# Patient Record
Sex: FEMALE | Race: WHITE | Employment: OTHER | URBAN - NONMETROPOLITAN AREA
[De-identification: names, ages, dates, MRNs, and addresses within clinical notes are randomized per-mention and may not be internally consistent; named-entity substitution may affect disease eponyms.]

---

## 2017-09-14 ENCOUNTER — HOSPITAL ENCOUNTER (OUTPATIENT)
Dept: GENERAL RADIOLOGY | Age: 77
Discharge: HOME | End: 2017-09-14
Payer: MEDICARE

## 2017-09-14 ENCOUNTER — HOSPITAL ENCOUNTER (OUTPATIENT)
Age: 77
Discharge: HOME | End: 2017-09-14
Payer: MEDICARE

## 2017-09-14 DIAGNOSIS — G89.29 CHRONIC LEFT HIP PAIN: ICD-10-CM

## 2017-09-14 DIAGNOSIS — M25.552 CHRONIC LEFT HIP PAIN: ICD-10-CM

## 2017-09-14 PROCEDURE — 73502 X-RAY EXAM HIP UNI 2-3 VIEWS: CPT

## 2018-02-02 ENCOUNTER — HOSPITAL ENCOUNTER (OUTPATIENT)
Dept: NON INVASIVE DIAGNOSTICS | Age: 78
Discharge: HOME | End: 2018-02-02
Payer: MEDICARE

## 2018-02-02 DIAGNOSIS — I48.0 PAROXYSMAL ATRIAL FIBRILLATION (HCC): ICD-10-CM

## 2018-02-02 LAB
LV EF: 60 %
LVEF MODALITY: NORMAL

## 2018-02-02 PROCEDURE — 93306 TTE W/DOPPLER COMPLETE: CPT

## 2018-02-19 ENCOUNTER — OFFICE VISIT (OUTPATIENT)
Dept: CARDIOLOGY | Age: 78
End: 2018-02-19
Payer: MEDICARE

## 2018-02-19 VITALS
SYSTOLIC BLOOD PRESSURE: 99 MMHG | DIASTOLIC BLOOD PRESSURE: 62 MMHG | HEART RATE: 55 BPM | WEIGHT: 152 LBS | BODY MASS INDEX: 25.95 KG/M2 | RESPIRATION RATE: 16 BRPM | HEIGHT: 64 IN

## 2018-02-19 DIAGNOSIS — I34.0 MODERATE MITRAL REGURGITATION: ICD-10-CM

## 2018-02-19 DIAGNOSIS — I48.0 PAROXYSMAL ATRIAL FIBRILLATION (HCC): ICD-10-CM

## 2018-02-19 DIAGNOSIS — I48.0 PAF (PAROXYSMAL ATRIAL FIBRILLATION) (HCC): Primary | ICD-10-CM

## 2018-02-19 PROCEDURE — 99204 OFFICE O/P NEW MOD 45 MIN: CPT | Performed by: FAMILY MEDICINE

## 2018-02-19 PROCEDURE — 93000 ELECTROCARDIOGRAM COMPLETE: CPT | Performed by: FAMILY MEDICINE

## 2018-02-19 RX ORDER — FLECAINIDE ACETATE 50 MG/1
TABLET ORAL
Qty: 60 TABLET | Refills: 3 | Status: CANCELLED | OUTPATIENT
Start: 2018-02-19

## 2018-02-19 NOTE — PROGRESS NOTES
Take by mouth         FAMILY HISTORY: family history includes Arthritis in her sister and sister; Atrial Fibrillation in her sister; Diabetes in her brother and mother; Heart Attack in her brother; Heart Disease in her mother; High Blood Pressure in her father. PHYSICAL EXAM:   BP 99/62 (Site: Right Arm, Position: Sitting, Cuff Size: Medium Adult)   Pulse 55   Resp 16   Ht 5' 3.5\" (1.613 m)   Wt 152 lb (68.9 kg)   BMI 26.50 kg/m²  Body mass index is 26.5 kg/m². Constitutional: She is oriented to person, place, and time. She appears well-developed and well-nourished. In no acute distress. HEENT: Normocephalic and atraumatic. No JVD present. Carotid bruit is not present. No mass and no thyromegaly present. No lymphadenopathy present. Cardiovascular: Normal rate, regular rhythm, normal heart sounds. Exam reveals no gallop and no friction rubs. A II/VI systolic murmur at the apex of the heart with a diastolic component  Pulmonary/Chest: Effort normal and breath sounds normal. No respiratory distress. She has no wheezes, rhonchi or rales. Abdominal: Soft, non-tender. Bowel sounds and aorta are normal. She exhibits no organomegaly, mass or bruit. Extremities: No edema. No cyanosis and no clubbing. Pulses are 2+ radial and carotid pulses. 2+ dorsalis pedis and posterior tibial pulses bilaterally. Neurological: She is alert and oriented to person. No evidence of gross cranial nerve deficit. Coordination appeared normal.   Skin: Skin is warm and dry. There is no rash or diaphoresis. Psychiatric: She has a normal mood and affect.  Her speech is normal and behavior is normal.      MOST RECENT LABS ON RECORD:   Lab Results   Component Value Date    WBC 5.1 10/27/2014    HGB 11.9 (L) 10/27/2014    HCT 35.3 (L) 10/27/2014     10/27/2014    CHOL 179 05/13/2017    TRIG 68 05/13/2017    HDL 51 05/13/2017     08/28/2013    K 4.2 08/28/2013     08/28/2013    CREATININE 0.75 08/28/2013    BUN 15 ischemia. Moderate Mitral Regurgitation: Asymptomatic-Left atrium is moderately dilated (34-39) w/ left atrial volume index of 37 ml/m2. · Beta Blocker: Stop Metoprolol due to bradycardia. · Repeat echocardiogram in 1-2 years or sooner as needed. Finally, I recommended that she continue her other medications and follow up with you as previously scheduled. FOLLOW UP:   I told Ms. Springer to call my office if she had any problems, but otherwise I asked her to Return in about 1 year (around 2/19/2019). However, I would be happy to see her sooner should the need arise. Sincerely,  Wil Gross MD, MS, F.A.C.C. [facility] Specialist     Place  [address] [place] [address] [address] [place] [address]  Phone: [phone], Fax: [phone]     I believe that the risk of significant morbidity and mortality related to the patient's current medical conditions are: Intermediate. The documentation recorded by the scribe, accurately and completely reflects the services I personally performed and the decisions made by me. Ace Grimes.  Dav Julien MD, MS, F.A.C.C. 2/19/2018

## 2018-02-26 ENCOUNTER — HOSPITAL ENCOUNTER (OUTPATIENT)
Dept: NON INVASIVE DIAGNOSTICS | Age: 78
Discharge: HOME | End: 2018-02-26
Payer: MEDICARE

## 2018-02-26 DIAGNOSIS — I48.0 PAF (PAROXYSMAL ATRIAL FIBRILLATION) (HCC): ICD-10-CM

## 2018-02-26 DIAGNOSIS — I34.0 MODERATE MITRAL REGURGITATION: ICD-10-CM

## 2018-02-26 DIAGNOSIS — I48.0 PAROXYSMAL ATRIAL FIBRILLATION (HCC): ICD-10-CM

## 2018-02-26 PROCEDURE — 3430000000 HC RX DIAGNOSTIC RADIOPHARMACEUTICAL: Performed by: INTERNAL MEDICINE

## 2018-02-26 PROCEDURE — 78452 HT MUSCLE IMAGE SPECT MULT: CPT

## 2018-02-26 PROCEDURE — 93017 CV STRESS TEST TRACING ONLY: CPT

## 2018-02-26 PROCEDURE — 6360000002 HC RX W HCPCS: Performed by: FAMILY MEDICINE

## 2018-02-26 PROCEDURE — A9500 TC99M SESTAMIBI: HCPCS | Performed by: INTERNAL MEDICINE

## 2018-02-26 RX ADMIN — REGADENOSON 0.4 MG: 0.08 INJECTION, SOLUTION INTRAVENOUS at 08:50

## 2018-02-26 RX ADMIN — Medication 29.8 MILLICURIE: at 08:55

## 2018-02-27 ENCOUNTER — HOSPITAL ENCOUNTER (OUTPATIENT)
Dept: NON INVASIVE DIAGNOSTICS | Age: 78
Discharge: HOME | End: 2018-02-27
Payer: MEDICARE

## 2018-02-27 PROCEDURE — A9500 TC99M SESTAMIBI: HCPCS | Performed by: INTERNAL MEDICINE

## 2018-02-27 PROCEDURE — 3430000000 HC RX DIAGNOSTIC RADIOPHARMACEUTICAL: Performed by: INTERNAL MEDICINE

## 2018-02-27 RX ADMIN — Medication 30.7 MILLICURIE: at 08:10

## 2018-03-01 ENCOUNTER — TELEPHONE (OUTPATIENT)
Dept: CARDIOLOGY | Age: 78
End: 2018-03-01

## 2018-03-14 ENCOUNTER — OFFICE VISIT (OUTPATIENT)
Dept: CARDIOLOGY | Age: 78
End: 2018-03-14
Payer: MEDICARE

## 2018-03-14 VITALS
BODY MASS INDEX: 25.78 KG/M2 | SYSTOLIC BLOOD PRESSURE: 106 MMHG | HEIGHT: 64 IN | OXYGEN SATURATION: 96 % | WEIGHT: 151 LBS | RESPIRATION RATE: 16 BRPM | HEART RATE: 67 BPM | DIASTOLIC BLOOD PRESSURE: 69 MMHG

## 2018-03-14 DIAGNOSIS — R94.39 ABNORMAL STRESS TEST: Primary | ICD-10-CM

## 2018-03-14 DIAGNOSIS — I48.0 PAF (PAROXYSMAL ATRIAL FIBRILLATION) (HCC): ICD-10-CM

## 2018-03-14 DIAGNOSIS — I34.0 MODERATE MITRAL REGURGITATION: ICD-10-CM

## 2018-03-14 PROCEDURE — 99215 OFFICE O/P EST HI 40 MIN: CPT | Performed by: FAMILY MEDICINE

## 2018-03-14 RX ORDER — AMLODIPINE BESYLATE 2.5 MG/1
2.5 TABLET ORAL DAILY
Qty: 90 TABLET | Refills: 3 | Status: ON HOLD | OUTPATIENT
Start: 2018-03-14 | End: 2018-04-25 | Stop reason: HOSPADM

## 2018-03-14 RX ORDER — ASPIRIN 81 MG/1
81 TABLET ORAL DAILY
Qty: 90 TABLET | Refills: 3
Start: 2018-03-14

## 2018-03-14 NOTE — PROGRESS NOTES
Abrahma Smith CMA am scribing for and in the presence of Dr. Jericho Melton    Patient: Cody Foss  : 1940  Date of Visit: 2018    REASON FOR VISIT / CONSULTATION: Results (Hx: PAF, moderate mitral regurg. Stress test done on 18. Pt states that she has stayed about the same since last visit. Pt can still feel some palpitations (few seconds) but no prolonged ones since last visit. Chest pressure on/off (few to multiples a week) SOB on/off and can happen with or without exertion but mild. Restart Eliquis at last visit and stopped metoprolol due to bradycardia. )      Dear Scott Colon,    I had the pleasure of seeing Cody Foss today. Ms. Evelyn Emery is a 68 y.o. female with a history of paroxysmal atrial fibrillation. She reports that this 1st occurred a few year ago and most recently occurred first part of February  but she says she has always had some degree of palpitations. Her last previous sustained event was about a year ago. She also has a history of mitral valve prolapse but denies any heart attacks, other cardiac issues or stroke or mini stroke. She recently underwent a cardiovascular stress test on 2018 that had show to be consistent with low to intermediate risk of coronary artery disease. Since last visit, Ms. Evelyn Emery says she is in an exercise class and notices her getting tired faster. She says that she does get mild shortness of breath and chest pressure a few times per week that last for minutes in duration. The only time that she had severe chest pain was when she had a sharp pain in the middle of her back which had happened a couple of times a couple of months ago but thinks this may have been due to a spasm. She denied any abdominal pain, bleeding problems, problems with her medications or any other concerns at this time.         Past Medical History:   Diagnosis Date    Fracture of sacrum ([address]     GERD (gastroesophageal reflux disease)     H/O cardiovascular stress test 02/28/2018    Equivocal myocardial perfusion study. There is a small/moderate perufsion defect of mild intensity in the apical an anteroapical regions during stress imaging which is most consistent with artifact but may be due to a small degree of coronary ischemia. Overall, these results are most consistent with a low/intermediate risk for CAD.     H/O echocardiogram 02/02/2018    EF 60%. Mildly increased left ventricular wall thickness. No definite specific wall motion abnormalities. Bi-atrial enlargment. Myxomatous thickening of the mitral leaflets with bileaflet prolapse and moderat mitral regurg. Moderate tricuspid regurg. Mild pulmonary HTN with an estimated right ventricualr systolic pressure 81BWFC.  History of Holter monitoring 2016    PACs & PVCs    Irritable bowel syndrome     Mitral valve prolapse     Osteoarthritis     Osteopenia     Rosacea     Tendonitis of shoulder     left       CURRENT ALLERGIES: Butalbital-aspirin-caffeine and Other REVIEW OF SYSTEMS: 14 systems were reviewed. Pertinent positives and negatives as above, all else negative.      Past Surgical History:   Procedure Laterality Date    BUNIONECTOMY      COLONOSCOPY  2008    CYSTOCELE REPAIR      HERNIA REPAIR      HYSTERECTOMY      MOHS SURGERY  2008    LUBA AND BSO Bilateral 2014    VAGINA SURGERY  2003    Sling    VEIN SURGERY      Social History:  Social History   Substance Use Topics    Smoking status: Never Smoker    Smokeless tobacco: Never Used    Alcohol use Yes        CURRENT MEDICATIONS:  Outpatient Prescriptions Marked as Taking for the 3/14/18 encounter (Office Visit) with Valencia Comer MD   Medication Sig Dispense Refill    apixaban (ELIQUIS) 5 MG TABS tablet Take 1 tablet by mouth 2 times daily 180 tablet 3    Multiple Vitamins-Minerals (MULTIVITAMIN ADULT PO) Take by mouth daily      omeprazole (PRILOSEC) 40 MG delayed release capsule Take 1 capsule by mouth daily 90 capsule 3    vitamin D and behavior is normal.      MOST RECENT LABS ON RECORD:   Lab Results   Component Value Date    WBC 5.1 10/27/2014    HGB 11.9 (L) 10/27/2014    HCT 35.3 (L) 10/27/2014     10/27/2014    CHOL 179 05/13/2017    TRIG 68 05/13/2017    HDL 51 05/13/2017     08/28/2013    K 4.2 08/28/2013     08/28/2013    CREATININE 0.75 08/28/2013    BUN 15 08/28/2013    CO2 32 (H) 08/28/2013    TSH 1.74 08/28/2013       ASSESSMENT:  1. Abnormal stress test    2. PAF (paroxysmal atrial fibrillation) ([address]    3. Moderate mitral regurgitation       PLAN:  · Abnormal Stress Test:   · Because of Ms. Springer's signs, symptoms, risk factors and abnormal stress test, I recommended that Ms. Springer consider undergoing a cardiac catheterization with coronary angiography to assess her coronary anatomy and facilitate better treatment recommendations. I discussed the risks, benefits, and alternatives to the procedure including the risk of bleeding, contrast induced allergy and/or kidney damage, arrythmia, stroke, heart attack, death, or the need for further procedures. I also discussed the fact that although treatment with simple medical management is a potential treatment option in place of cardiac catheterization, I expressed my opinion that cardiac catheterization in order to define her coronary anatomy and rule out severe 3 vessel or left main coronary artery disease would significant help guide the most appropriate treatment strategy ranging from no treatment to medications, to stents, to even bypass surgery. Ms. Maria M Brower verbalized understanding of the risks benefits and alternatives and stated that she would like to proceed with heart catheterization.  I also discussed the advantages and disadvantages of having her procedure performed here at [facility] vs. a larger hospital such as [facility]. Beyond the obvious advantage of convenience, I also explained potential disadvantages including the inability to sooner as needed. Finally, I recommended that she continue her other medications and follow up with you as previously scheduled. FOLLOW UP:   I told Ms. Springer to call my office if she had any problems, but otherwise I asked her to Return in about 3 months (around 6/14/2018). However, I would be happy to see her sooner should the need arise. Sincerely,  Jimenez Carlisle. Renato MARTIN, MS, F.A.C.C. [facility] Cardiology Specialist    [facility] [address] [place] [address]  Phone: [phone], Fax: [phone]     I believe that the risk of significant morbidity and mortality related to the patient's current medical conditions are: intermediate-high. The documentation recorded by the scribe, accurately and completely reflects the services I personally performed and the decisions made by me. Bobo Devries.  Margaux Johnson MD, MS, F.A.C.C. 3/14/2018

## 2018-04-12 PROBLEM — I48.0 PAROXYSMAL ATRIAL FIBRILLATION (HCC): Status: ACTIVE | Noted: 2018-01-01

## 2018-04-24 ENCOUNTER — APPOINTMENT (OUTPATIENT)
Dept: GENERAL RADIOLOGY | Age: 78
DRG: 287 | End: 2018-04-24
Payer: MEDICARE

## 2018-04-24 ENCOUNTER — HOSPITAL ENCOUNTER (INPATIENT)
Age: 78
LOS: 1 days | Discharge: HOME | DRG: 287 | End: 2018-04-25
Attending: EMERGENCY MEDICINE | Admitting: INTERNAL MEDICINE
Payer: MEDICARE

## 2018-04-24 DIAGNOSIS — I49.5 TACHYCARDIA-BRADYCARDIA SYNDROME (HCC): Primary | ICD-10-CM

## 2018-04-24 DIAGNOSIS — I48.0 PAROXYSMAL ATRIAL FIBRILLATION (HCC): ICD-10-CM

## 2018-04-24 PROBLEM — I20.9 ANGINA, CLASS IV (HCC): Status: ACTIVE | Noted: 2018-04-24

## 2018-04-24 PROBLEM — I48.91 A-FIB (HCC): Status: ACTIVE | Noted: 2018-04-24

## 2018-04-24 PROBLEM — R94.39 ABNORMAL CARDIOVASCULAR STRESS TEST: Status: ACTIVE | Noted: 2018-04-24

## 2018-04-24 LAB
ABSOLUTE EOS #: 0.18 K/UL (ref 0–0.44)
ABSOLUTE IMMATURE GRANULOCYTE: <0.03 K/UL (ref 0–0.3)
ABSOLUTE LYMPH #: 1.37 K/UL (ref 1.1–3.7)
ABSOLUTE MONO #: 0.47 K/UL (ref 0.1–1.2)
ANION GAP SERPL CALCULATED.3IONS-SCNC: 11 MMOL/L (ref 9–17)
BASOPHILS # BLD: 1 % (ref 0–2)
BASOPHILS ABSOLUTE: 0.05 K/UL (ref 0–0.2)
BUN BLDV-MCNC: 12 MG/DL (ref 8–23)
BUN/CREAT BLD: 18 (ref 9–20)
CALCIUM SERPL-MCNC: 9.8 MG/DL (ref 8.6–10.4)
CHLORIDE BLD-SCNC: 101 MMOL/L (ref 98–107)
CO2: 29 MMOL/L (ref 20–31)
CREAT SERPL-MCNC: 0.67 MG/DL (ref 0.5–0.9)
DIFFERENTIAL TYPE: ABNORMAL
EKG ATRIAL RATE: 122 BPM
EKG Q-T INTERVAL: 302 MS
EKG QRS DURATION: 94 MS
EKG QTC CALCULATION (BAZETT): 447 MS
EKG R AXIS: 22 DEGREES
EKG T AXIS: 27 DEGREES
EKG VENTRICULAR RATE: 132 BPM
EOSINOPHILS RELATIVE PERCENT: 4 % (ref 1–4)
GFR AFRICAN AMERICAN: >60 ML/MIN
GFR NON-AFRICAN AMERICAN: >60 ML/MIN
GFR SERPL CREATININE-BSD FRML MDRD: ABNORMAL ML/MIN/{1.73_M2}
GFR SERPL CREATININE-BSD FRML MDRD: ABNORMAL ML/MIN/{1.73_M2}
GLUCOSE BLD-MCNC: 103 MG/DL (ref 70–99)
HCT VFR BLD CALC: 42 % (ref 36.3–47.1)
HEMOGLOBIN: 14.2 G/DL (ref 11.9–15.1)
IMMATURE GRANULOCYTES: 0 %
LYMPHOCYTES # BLD: 34 % (ref 24–43)
MAGNESIUM: 2 MG/DL (ref 1.6–2.6)
MCH RBC QN AUTO: 33.8 PG (ref 25.2–33.5)
MCHC RBC AUTO-ENTMCNC: 33.8 G/DL (ref 28.4–34.8)
MCV RBC AUTO: 100 FL (ref 82.6–102.9)
MONOCYTES # BLD: 12 % (ref 3–12)
NRBC AUTOMATED: 0 PER 100 WBC
PDW BLD-RTO: 12.6 % (ref 11.8–14.4)
PLATELET # BLD: 177 K/UL (ref 138–453)
PLATELET ESTIMATE: ABNORMAL
PMV BLD AUTO: 9.5 FL (ref 8.1–13.5)
POTASSIUM SERPL-SCNC: 4.1 MMOL/L (ref 3.7–5.3)
RBC # BLD: 4.2 M/UL (ref 3.95–5.11)
RBC # BLD: ABNORMAL 10*6/UL
SEG NEUTROPHILS: 49 % (ref 36–65)
SEGMENTED NEUTROPHILS ABSOLUTE COUNT: 2 K/UL (ref 1.5–8.1)
SODIUM BLD-SCNC: 141 MMOL/L (ref 135–144)
THYROXINE, FREE: 1.11 NG/DL (ref 0.93–1.7)
TROPONIN INTERP: NORMAL
TROPONIN T: <0.03 NG/ML
TSH SERPL DL<=0.05 MIU/L-ACNC: 2.46 MIU/L (ref 0.3–5)
WBC # BLD: 4.1 K/UL (ref 3.5–11.3)
WBC # BLD: ABNORMAL 10*3/UL

## 2018-04-24 PROCEDURE — 2500000003 HC RX 250 WO HCPCS

## 2018-04-24 PROCEDURE — G0378 HOSPITAL OBSERVATION PER HR: HCPCS

## 2018-04-24 PROCEDURE — 96367 TX/PROPH/DG ADDL SEQ IV INF: CPT

## 2018-04-24 PROCEDURE — 71045 X-RAY EXAM CHEST 1 VIEW: CPT

## 2018-04-24 PROCEDURE — 84484 ASSAY OF TROPONIN QUANT: CPT

## 2018-04-24 PROCEDURE — 83735 ASSAY OF MAGNESIUM: CPT

## 2018-04-24 PROCEDURE — 99222 1ST HOSP IP/OBS MODERATE 55: CPT | Performed by: FAMILY MEDICINE

## 2018-04-24 PROCEDURE — 6370000000 HC RX 637 (ALT 250 FOR IP): Performed by: INTERNAL MEDICINE

## 2018-04-24 PROCEDURE — 2580000003 HC RX 258: Performed by: PHYSICIAN ASSISTANT

## 2018-04-24 PROCEDURE — 84443 ASSAY THYROID STIM HORMONE: CPT

## 2018-04-24 PROCEDURE — 93005 ELECTROCARDIOGRAM TRACING: CPT

## 2018-04-24 PROCEDURE — 96365 THER/PROPH/DIAG IV INF INIT: CPT

## 2018-04-24 PROCEDURE — 2580000003 HC RX 258: Performed by: EMERGENCY MEDICINE

## 2018-04-24 PROCEDURE — 80048 BASIC METABOLIC PNL TOTAL CA: CPT

## 2018-04-24 PROCEDURE — 2500000003 HC RX 250 WO HCPCS: Performed by: EMERGENCY MEDICINE

## 2018-04-24 PROCEDURE — 6360000002 HC RX W HCPCS

## 2018-04-24 PROCEDURE — 1200000000 HC SEMI PRIVATE

## 2018-04-24 PROCEDURE — 6360000002 HC RX W HCPCS: Performed by: EMERGENCY MEDICINE

## 2018-04-24 PROCEDURE — 99285 EMERGENCY DEPT VISIT HI MDM: CPT

## 2018-04-24 PROCEDURE — 84439 ASSAY OF FREE THYROXINE: CPT

## 2018-04-24 PROCEDURE — 36415 COLL VENOUS BLD VENIPUNCTURE: CPT

## 2018-04-24 PROCEDURE — 85025 COMPLETE CBC W/AUTO DIFF WBC: CPT

## 2018-04-24 RX ORDER — ASPIRIN 81 MG/1
81 TABLET ORAL DAILY
Status: DISCONTINUED | OUTPATIENT
Start: 2018-04-25 | End: 2018-04-25 | Stop reason: HOSPADM

## 2018-04-24 RX ORDER — AMLODIPINE BESYLATE 2.5 MG/1
2.5 TABLET ORAL DAILY
Status: DISCONTINUED | OUTPATIENT
Start: 2018-04-25 | End: 2018-04-25 | Stop reason: HOSPADM

## 2018-04-24 RX ORDER — DILTIAZEM HYDROCHLORIDE 5 MG/ML
10 INJECTION INTRAVENOUS ONCE
Status: COMPLETED | OUTPATIENT
Start: 2018-04-24 | End: 2018-04-24

## 2018-04-24 RX ORDER — AMIODARONE HYDROCHLORIDE 50 MG/ML
200 INJECTION, SOLUTION INTRAVENOUS ONCE
Status: DISCONTINUED | OUTPATIENT
Start: 2018-04-24 | End: 2018-04-24 | Stop reason: SDUPTHER

## 2018-04-24 RX ORDER — METOPROLOL SUCCINATE 25 MG/1
25 TABLET, EXTENDED RELEASE ORAL DAILY
Status: DISCONTINUED | OUTPATIENT
Start: 2018-04-24 | End: 2018-04-24

## 2018-04-24 RX ORDER — MAGNESIUM SULFATE 1 G/100ML
1 INJECTION INTRAVENOUS PRN
Status: DISCONTINUED | OUTPATIENT
Start: 2018-04-24 | End: 2018-04-25 | Stop reason: HOSPADM

## 2018-04-24 RX ORDER — SODIUM CHLORIDE 0.9 % (FLUSH) 0.9 %
10 SYRINGE (ML) INJECTION EVERY 12 HOURS SCHEDULED
Status: DISCONTINUED | OUTPATIENT
Start: 2018-04-24 | End: 2018-04-25 | Stop reason: HOSPADM

## 2018-04-24 RX ORDER — POTASSIUM CHLORIDE 20 MEQ/1
40 TABLET, EXTENDED RELEASE ORAL PRN
Status: DISCONTINUED | OUTPATIENT
Start: 2018-04-24 | End: 2018-04-25 | Stop reason: HOSPADM

## 2018-04-24 RX ORDER — SODIUM CHLORIDE 0.9 % (FLUSH) 0.9 %
10 SYRINGE (ML) INJECTION PRN
Status: DISCONTINUED | OUTPATIENT
Start: 2018-04-24 | End: 2018-04-25 | Stop reason: HOSPADM

## 2018-04-24 RX ORDER — POTASSIUM CHLORIDE 20MEQ/15ML
40 LIQUID (ML) ORAL PRN
Status: DISCONTINUED | OUTPATIENT
Start: 2018-04-24 | End: 2018-04-25 | Stop reason: HOSPADM

## 2018-04-24 RX ORDER — ACETAMINOPHEN 325 MG/1
650 TABLET ORAL EVERY 4 HOURS PRN
Status: DISCONTINUED | OUTPATIENT
Start: 2018-04-24 | End: 2018-04-25 | Stop reason: HOSPADM

## 2018-04-24 RX ORDER — POTASSIUM CHLORIDE 7.45 MG/ML
10 INJECTION INTRAVENOUS PRN
Status: DISCONTINUED | OUTPATIENT
Start: 2018-04-24 | End: 2018-04-25 | Stop reason: HOSPADM

## 2018-04-24 RX ORDER — DOCUSATE SODIUM 100 MG/1
200 CAPSULE, LIQUID FILLED ORAL NIGHTLY
Status: DISCONTINUED | OUTPATIENT
Start: 2018-04-24 | End: 2018-04-25 | Stop reason: HOSPADM

## 2018-04-24 RX ORDER — PANTOPRAZOLE SODIUM 40 MG/1
40 TABLET, DELAYED RELEASE ORAL
Status: DISCONTINUED | OUTPATIENT
Start: 2018-04-25 | End: 2018-04-25 | Stop reason: HOSPADM

## 2018-04-24 RX ORDER — ONDANSETRON 2 MG/ML
4 INJECTION INTRAMUSCULAR; INTRAVENOUS EVERY 6 HOURS PRN
Status: DISCONTINUED | OUTPATIENT
Start: 2018-04-24 | End: 2018-04-25

## 2018-04-24 RX ADMIN — VITAMIN D, TAB 1000IU (100/BT) 1000 UNITS: 25 TAB at 20:08

## 2018-04-24 RX ADMIN — DOCUSATE SODIUM 200 MG: 100 CAPSULE, LIQUID FILLED ORAL at 20:08

## 2018-04-24 RX ADMIN — Medication 10 ML: at 20:09

## 2018-04-24 RX ADMIN — APIXABAN 5 MG: 5 TABLET, FILM COATED ORAL at 20:08

## 2018-04-24 RX ADMIN — AMIODARONE HYDROCHLORIDE 200 MG: 50 INJECTION, SOLUTION INTRAVENOUS at 10:57

## 2018-04-24 RX ADMIN — DILTIAZEM HYDROCHLORIDE 10 MG: 5 INJECTION INTRAVENOUS at 09:21

## 2018-04-24 RX ADMIN — DILTIAZEM HYDROCHLORIDE 10 MG/HR: 5 INJECTION INTRAVENOUS at 09:22

## 2018-04-24 RX ADMIN — Medication 400 MG: at 20:08

## 2018-04-24 ASSESSMENT — ENCOUNTER SYMPTOMS
SHORTNESS OF BREATH: 0
ABDOMINAL DISTENTION: 0
COUGH: 0
WHEEZING: 0
BACK PAIN: 0
ABDOMINAL PAIN: 0
COLOR CHANGE: 0
CHEST TIGHTNESS: 0

## 2018-04-24 ASSESSMENT — PAIN DESCRIPTION - LOCATION: LOCATION: CHEST

## 2018-04-24 ASSESSMENT — PAIN SCALES - GENERAL
PAINLEVEL_OUTOF10: 1
PAINLEVEL_OUTOF10: 0

## 2018-04-24 ASSESSMENT — PAIN DESCRIPTION - PAIN TYPE: TYPE: ACUTE PAIN

## 2018-04-24 ASSESSMENT — PAIN DESCRIPTION - ORIENTATION: ORIENTATION: ANTERIOR;MID

## 2018-04-24 ASSESSMENT — PAIN DESCRIPTION - DESCRIPTORS: DESCRIPTORS: PRESSURE

## 2018-04-25 ENCOUNTER — APPOINTMENT (OUTPATIENT)
Dept: CARDIAC CATH/INVASIVE PROCEDURES | Age: 78
DRG: 287 | End: 2018-04-25
Payer: MEDICARE

## 2018-04-25 VITALS
DIASTOLIC BLOOD PRESSURE: 67 MMHG | BODY MASS INDEX: 24.45 KG/M2 | HEIGHT: 64 IN | TEMPERATURE: 97.1 F | SYSTOLIC BLOOD PRESSURE: 101 MMHG | RESPIRATION RATE: 15 BRPM | WEIGHT: 143.2 LBS | HEART RATE: 66 BPM | OXYGEN SATURATION: 97 %

## 2018-04-25 LAB
ALBUMIN SERPL-MCNC: 3.8 G/DL (ref 3.5–5.2)
ALBUMIN/GLOBULIN RATIO: 1.7 (ref 1–2.5)
ALP BLD-CCNC: 94 U/L (ref 35–104)
ALT SERPL-CCNC: 18 U/L (ref 5–33)
ANION GAP SERPL CALCULATED.3IONS-SCNC: 9 MMOL/L (ref 9–17)
AST SERPL-CCNC: 26 U/L
BILIRUB SERPL-MCNC: 0.41 MG/DL (ref 0.3–1.2)
BUN BLDV-MCNC: 15 MG/DL (ref 8–23)
BUN/CREAT BLD: 23 (ref 9–20)
CALCIUM SERPL-MCNC: 8.7 MG/DL (ref 8.6–10.4)
CHLORIDE BLD-SCNC: 101 MMOL/L (ref 98–107)
CO2: 30 MMOL/L (ref 20–31)
CREAT SERPL-MCNC: 0.65 MG/DL (ref 0.5–0.9)
EKG ATRIAL RATE: 56 BPM
EKG P AXIS: 92 DEGREES
EKG P-R INTERVAL: 160 MS
EKG Q-T INTERVAL: 432 MS
EKG QRS DURATION: 92 MS
EKG QTC CALCULATION (BAZETT): 416 MS
EKG R AXIS: 34 DEGREES
EKG T AXIS: 52 DEGREES
EKG VENTRICULAR RATE: 56 BPM
GFR AFRICAN AMERICAN: >60 ML/MIN
GFR NON-AFRICAN AMERICAN: >60 ML/MIN
GFR SERPL CREATININE-BSD FRML MDRD: ABNORMAL ML/MIN/{1.73_M2}
GFR SERPL CREATININE-BSD FRML MDRD: ABNORMAL ML/MIN/{1.73_M2}
GLUCOSE BLD-MCNC: 94 MG/DL (ref 70–99)
HCT VFR BLD CALC: 38.1 % (ref 36.3–47.1)
HEMOGLOBIN: 12.6 G/DL (ref 11.9–15.1)
MCH RBC QN AUTO: 33.5 PG (ref 25.2–33.5)
MCHC RBC AUTO-ENTMCNC: 33.1 G/DL (ref 28.4–34.8)
MCV RBC AUTO: 101.3 FL (ref 82.6–102.9)
NRBC AUTOMATED: 0 PER 100 WBC
PDW BLD-RTO: 12.9 % (ref 11.8–14.4)
PLATELET # BLD: 165 K/UL (ref 138–453)
PMV BLD AUTO: 9.6 FL (ref 8.1–13.5)
POTASSIUM SERPL-SCNC: 4 MMOL/L (ref 3.7–5.3)
RBC # BLD: 3.76 M/UL (ref 3.95–5.11)
SODIUM BLD-SCNC: 140 MMOL/L (ref 135–144)
TOTAL PROTEIN: 6.1 G/DL (ref 6.4–8.3)
WBC # BLD: 4.1 K/UL (ref 3.5–11.3)

## 2018-04-25 PROCEDURE — 6370000000 HC RX 637 (ALT 250 FOR IP): Performed by: INTERNAL MEDICINE

## 2018-04-25 PROCEDURE — 93458 L HRT ARTERY/VENTRICLE ANGIO: CPT | Performed by: FAMILY MEDICINE

## 2018-04-25 PROCEDURE — C1887 CATHETER, GUIDING: HCPCS

## 2018-04-25 PROCEDURE — 93005 ELECTROCARDIOGRAM TRACING: CPT

## 2018-04-25 PROCEDURE — 2709999900 HC NON-CHARGEABLE SUPPLY

## 2018-04-25 PROCEDURE — C1725 CATH, TRANSLUMIN NON-LASER: HCPCS

## 2018-04-25 PROCEDURE — 85027 COMPLETE CBC AUTOMATED: CPT

## 2018-04-25 PROCEDURE — C1769 GUIDE WIRE: HCPCS

## 2018-04-25 PROCEDURE — 6370000000 HC RX 637 (ALT 250 FOR IP): Performed by: FAMILY MEDICINE

## 2018-04-25 PROCEDURE — 80053 COMPREHEN METABOLIC PANEL: CPT

## 2018-04-25 PROCEDURE — G0378 HOSPITAL OBSERVATION PER HR: HCPCS

## 2018-04-25 PROCEDURE — B2111ZZ FLUOROSCOPY OF MULTIPLE CORONARY ARTERIES USING LOW OSMOLAR CONTRAST: ICD-10-PCS | Performed by: FAMILY MEDICINE

## 2018-04-25 PROCEDURE — B2151ZZ FLUOROSCOPY OF LEFT HEART USING LOW OSMOLAR CONTRAST: ICD-10-PCS | Performed by: FAMILY MEDICINE

## 2018-04-25 PROCEDURE — 4A023N7 MEASUREMENT OF CARDIAC SAMPLING AND PRESSURE, LEFT HEART, PERCUTANEOUS APPROACH: ICD-10-PCS | Performed by: FAMILY MEDICINE

## 2018-04-25 PROCEDURE — 36415 COLL VENOUS BLD VENIPUNCTURE: CPT

## 2018-04-25 PROCEDURE — C1894 INTRO/SHEATH, NON-LASER: HCPCS

## 2018-04-25 RX ORDER — AMIODARONE HYDROCHLORIDE 100 MG/1
100 TABLET ORAL DAILY
Qty: 30 TABLET | Refills: 0 | Status: ON HOLD | OUTPATIENT
Start: 2018-04-26 | End: 2018-05-04 | Stop reason: HOSPADM

## 2018-04-25 RX ORDER — AMIODARONE HYDROCHLORIDE 200 MG/1
100 TABLET ORAL DAILY
Status: DISCONTINUED | OUTPATIENT
Start: 2018-04-25 | End: 2018-04-25 | Stop reason: HOSPADM

## 2018-04-25 RX ADMIN — AMLODIPINE BESYLATE 2.5 MG: 2.5 TABLET ORAL at 10:41

## 2018-04-25 RX ADMIN — PANTOPRAZOLE SODIUM 40 MG: 40 TABLET, DELAYED RELEASE ORAL at 10:41

## 2018-04-25 RX ADMIN — AMIODARONE HYDROCHLORIDE 100 MG: 200 TABLET ORAL at 10:41

## 2018-04-25 RX ADMIN — ASPIRIN 81 MG: 81 TABLET, COATED ORAL at 10:41

## 2018-04-25 ASSESSMENT — PAIN SCALES - GENERAL: PAINLEVEL_OUTOF10: 0

## 2018-04-26 ENCOUNTER — TELEPHONE (OUTPATIENT)
Dept: PHARMACY | Facility: CLINIC | Age: 78
End: 2018-04-26

## 2018-04-26 DIAGNOSIS — I48.91 ATRIAL FIBRILLATION WITH RAPID VENTRICULAR RESPONSE (HCC): Primary | ICD-10-CM

## 2018-04-26 PROCEDURE — 1111F DSCHRG MED/CURRENT MED MERGE: CPT | Performed by: PHARMACIST

## 2018-05-01 ENCOUNTER — HOSPITAL ENCOUNTER (INPATIENT)
Age: 78
LOS: 3 days | Discharge: HOME | DRG: 950 | End: 2018-05-04
Attending: INTERNAL MEDICINE | Admitting: INTERNAL MEDICINE
Payer: MEDICARE

## 2018-05-01 PROBLEM — Z51.81 VISIT FOR MONITORING TIKOSYN THERAPY: Status: ACTIVE | Noted: 2018-05-01

## 2018-05-01 PROBLEM — Z79.899 VISIT FOR MONITORING TIKOSYN THERAPY: Status: ACTIVE | Noted: 2018-05-01

## 2018-05-01 LAB
ANION GAP SERPL CALCULATED.3IONS-SCNC: 10 MMOL/L (ref 9–17)
BUN BLDV-MCNC: 18 MG/DL (ref 8–23)
BUN/CREAT BLD: 25 (ref 9–20)
CALCIUM SERPL-MCNC: 9.3 MG/DL (ref 8.6–10.4)
CHLORIDE BLD-SCNC: 100 MMOL/L (ref 98–107)
CO2: 28 MMOL/L (ref 20–31)
CREAT SERPL-MCNC: 0.73 MG/DL (ref 0.5–0.9)
GFR AFRICAN AMERICAN: >60 ML/MIN
GFR NON-AFRICAN AMERICAN: >60 ML/MIN
GFR SERPL CREATININE-BSD FRML MDRD: ABNORMAL ML/MIN/{1.73_M2}
GFR SERPL CREATININE-BSD FRML MDRD: ABNORMAL ML/MIN/{1.73_M2}
GLUCOSE BLD-MCNC: 74 MG/DL (ref 70–99)
HCT VFR BLD CALC: 38.2 % (ref 36.3–47.1)
HEMOGLOBIN: 12.5 G/DL (ref 11.9–15.1)
MAGNESIUM: 1.9 MG/DL (ref 1.6–2.6)
MCH RBC QN AUTO: 33.1 PG (ref 25.2–33.5)
MCHC RBC AUTO-ENTMCNC: 32.7 G/DL (ref 28.4–34.8)
MCV RBC AUTO: 101.1 FL (ref 82.6–102.9)
NRBC AUTOMATED: 0 PER 100 WBC
PDW BLD-RTO: 12.7 % (ref 11.8–14.4)
PHOSPHORUS: 3.8 MG/DL (ref 2.6–4.5)
PLATELET # BLD: 162 K/UL (ref 138–453)
PMV BLD AUTO: 9.6 FL (ref 8.1–13.5)
POTASSIUM SERPL-SCNC: 3.9 MMOL/L (ref 3.7–5.3)
RBC # BLD: 3.78 M/UL (ref 3.95–5.11)
SODIUM BLD-SCNC: 138 MMOL/L (ref 135–144)
WBC # BLD: 3.9 K/UL (ref 3.5–11.3)

## 2018-05-01 PROCEDURE — 94760 N-INVAS EAR/PLS OXIMETRY 1: CPT

## 2018-05-01 PROCEDURE — 1200000000 HC SEMI PRIVATE

## 2018-05-01 PROCEDURE — 6370000000 HC RX 637 (ALT 250 FOR IP): Performed by: FAMILY MEDICINE

## 2018-05-01 PROCEDURE — 83735 ASSAY OF MAGNESIUM: CPT

## 2018-05-01 PROCEDURE — 99222 1ST HOSP IP/OBS MODERATE 55: CPT | Performed by: FAMILY MEDICINE

## 2018-05-01 PROCEDURE — 80048 BASIC METABOLIC PNL TOTAL CA: CPT

## 2018-05-01 PROCEDURE — 93005 ELECTROCARDIOGRAM TRACING: CPT

## 2018-05-01 PROCEDURE — 6370000000 HC RX 637 (ALT 250 FOR IP): Performed by: PHYSICIAN ASSISTANT

## 2018-05-01 PROCEDURE — 84100 ASSAY OF PHOSPHORUS: CPT

## 2018-05-01 PROCEDURE — 2580000003 HC RX 258: Performed by: PHYSICIAN ASSISTANT

## 2018-05-01 PROCEDURE — 85027 COMPLETE CBC AUTOMATED: CPT

## 2018-05-01 PROCEDURE — 36415 COLL VENOUS BLD VENIPUNCTURE: CPT

## 2018-05-01 RX ORDER — ONDANSETRON 2 MG/ML
4 INJECTION INTRAMUSCULAR; INTRAVENOUS EVERY 6 HOURS PRN
Status: DISCONTINUED | OUTPATIENT
Start: 2018-05-01 | End: 2018-05-01

## 2018-05-01 RX ORDER — CALCIUM CARBONATE 300MG(750)
1 TABLET,CHEWABLE ORAL NIGHTLY
Status: DISCONTINUED | OUTPATIENT
Start: 2018-05-01 | End: 2018-05-01

## 2018-05-01 RX ORDER — DOFETILIDE 0.25 MG/1
250 CAPSULE ORAL EVERY 12 HOURS SCHEDULED
Status: DISCONTINUED | OUTPATIENT
Start: 2018-05-01 | End: 2018-05-04 | Stop reason: HOSPADM

## 2018-05-01 RX ORDER — M-VIT,TX,IRON,MINS/CALC/FOLIC 27MG-0.4MG
1 TABLET ORAL DAILY
Status: DISCONTINUED | OUTPATIENT
Start: 2018-05-02 | End: 2018-05-04 | Stop reason: HOSPADM

## 2018-05-01 RX ORDER — DOCUSATE SODIUM 100 MG/1
200 CAPSULE, LIQUID FILLED ORAL NIGHTLY
Status: DISCONTINUED | OUTPATIENT
Start: 2018-05-01 | End: 2018-05-04 | Stop reason: HOSPADM

## 2018-05-01 RX ORDER — LACTOBACILLUS RHAMNOSUS GG 10B CELL
1 CAPSULE ORAL DAILY
Status: DISCONTINUED | OUTPATIENT
Start: 2018-05-01 | End: 2018-05-04 | Stop reason: HOSPADM

## 2018-05-01 RX ORDER — ASPIRIN 81 MG/1
81 TABLET ORAL DAILY
Status: DISCONTINUED | OUTPATIENT
Start: 2018-05-02 | End: 2018-05-04 | Stop reason: HOSPADM

## 2018-05-01 RX ORDER — SODIUM CHLORIDE 0.9 % (FLUSH) 0.9 %
10 SYRINGE (ML) INJECTION EVERY 12 HOURS SCHEDULED
Status: DISCONTINUED | OUTPATIENT
Start: 2018-05-01 | End: 2018-05-04 | Stop reason: HOSPADM

## 2018-05-01 RX ORDER — SODIUM CHLORIDE 0.9 % (FLUSH) 0.9 %
10 SYRINGE (ML) INJECTION PRN
Status: DISCONTINUED | OUTPATIENT
Start: 2018-05-01 | End: 2018-05-04 | Stop reason: HOSPADM

## 2018-05-01 RX ORDER — PANTOPRAZOLE SODIUM 40 MG/1
40 TABLET, DELAYED RELEASE ORAL
Status: DISCONTINUED | OUTPATIENT
Start: 2018-05-02 | End: 2018-05-04 | Stop reason: HOSPADM

## 2018-05-01 RX ADMIN — DOCUSATE SODIUM 200 MG: 100 CAPSULE, LIQUID FILLED ORAL at 20:59

## 2018-05-01 RX ADMIN — DOFETILIDE 250 MCG: 0.25 CAPSULE ORAL at 20:59

## 2018-05-01 RX ADMIN — Medication 10 ML: at 09:02

## 2018-05-01 RX ADMIN — APIXABAN 5 MG: 5 TABLET, FILM COATED ORAL at 20:59

## 2018-05-01 RX ADMIN — VITAMIN D, TAB 1000IU (100/BT) 1000 UNITS: 25 TAB at 20:59

## 2018-05-01 RX ADMIN — DOFETILIDE 250 MCG: 0.25 CAPSULE ORAL at 11:06

## 2018-05-01 RX ADMIN — Medication 10 ML: at 21:03

## 2018-05-01 ASSESSMENT — PAIN SCALES - GENERAL
PAINLEVEL_OUTOF10: 0

## 2018-05-02 LAB
ANION GAP SERPL CALCULATED.3IONS-SCNC: 6 MMOL/L (ref 9–17)
BUN BLDV-MCNC: 16 MG/DL (ref 8–23)
BUN/CREAT BLD: 24 (ref 9–20)
CALCIUM SERPL-MCNC: 8.8 MG/DL (ref 8.6–10.4)
CHLORIDE BLD-SCNC: 102 MMOL/L (ref 98–107)
CO2: 31 MMOL/L (ref 20–31)
CREAT SERPL-MCNC: 0.67 MG/DL (ref 0.5–0.9)
EKG ATRIAL RATE: 50 BPM
EKG ATRIAL RATE: 50 BPM
EKG ATRIAL RATE: 52 BPM
EKG ATRIAL RATE: 54 BPM
EKG P AXIS: 100 DEGREES
EKG P AXIS: 22 DEGREES
EKG P AXIS: 87 DEGREES
EKG P AXIS: 90 DEGREES
EKG P-R INTERVAL: 150 MS
EKG P-R INTERVAL: 152 MS
EKG P-R INTERVAL: 164 MS
EKG P-R INTERVAL: 166 MS
EKG Q-T INTERVAL: 450 MS
EKG Q-T INTERVAL: 460 MS
EKG Q-T INTERVAL: 488 MS
EKG Q-T INTERVAL: 512 MS
EKG QRS DURATION: 78 MS
EKG QRS DURATION: 84 MS
EKG QRS DURATION: 86 MS
EKG QRS DURATION: 96 MS
EKG QTC CALCULATION (BAZETT): 410 MS
EKG QTC CALCULATION (BAZETT): 436 MS
EKG QTC CALCULATION (BAZETT): 444 MS
EKG QTC CALCULATION (BAZETT): 476 MS
EKG R AXIS: 28 DEGREES
EKG R AXIS: 33 DEGREES
EKG R AXIS: 33 DEGREES
EKG R AXIS: 34 DEGREES
EKG T AXIS: 41 DEGREES
EKG T AXIS: 48 DEGREES
EKG T AXIS: 51 DEGREES
EKG T AXIS: 51 DEGREES
EKG VENTRICULAR RATE: 50 BPM
EKG VENTRICULAR RATE: 50 BPM
EKG VENTRICULAR RATE: 52 BPM
EKG VENTRICULAR RATE: 54 BPM
GFR AFRICAN AMERICAN: >60 ML/MIN
GFR NON-AFRICAN AMERICAN: >60 ML/MIN
GFR SERPL CREATININE-BSD FRML MDRD: ABNORMAL ML/MIN/{1.73_M2}
GFR SERPL CREATININE-BSD FRML MDRD: ABNORMAL ML/MIN/{1.73_M2}
GLUCOSE BLD-MCNC: 95 MG/DL (ref 70–99)
HCT VFR BLD CALC: 36.3 % (ref 36.3–47.1)
HEMOGLOBIN: 12 G/DL (ref 11.9–15.1)
MAGNESIUM: 2 MG/DL (ref 1.6–2.6)
MCH RBC QN AUTO: 33.5 PG (ref 25.2–33.5)
MCHC RBC AUTO-ENTMCNC: 33.1 G/DL (ref 28.4–34.8)
MCV RBC AUTO: 101.4 FL (ref 82.6–102.9)
NRBC AUTOMATED: 0 PER 100 WBC
PDW BLD-RTO: 12.5 % (ref 11.8–14.4)
PHOSPHORUS: 4.3 MG/DL (ref 2.6–4.5)
PLATELET # BLD: 156 K/UL (ref 138–453)
PMV BLD AUTO: 9.5 FL (ref 8.1–13.5)
POTASSIUM SERPL-SCNC: 4.1 MMOL/L (ref 3.7–5.3)
RBC # BLD: 3.58 M/UL (ref 3.95–5.11)
SODIUM BLD-SCNC: 139 MMOL/L (ref 135–144)
WBC # BLD: 3.4 K/UL (ref 3.5–11.3)

## 2018-05-02 PROCEDURE — 80048 BASIC METABOLIC PNL TOTAL CA: CPT

## 2018-05-02 PROCEDURE — 6370000000 HC RX 637 (ALT 250 FOR IP): Performed by: INTERNAL MEDICINE

## 2018-05-02 PROCEDURE — 83735 ASSAY OF MAGNESIUM: CPT

## 2018-05-02 PROCEDURE — 93005 ELECTROCARDIOGRAM TRACING: CPT

## 2018-05-02 PROCEDURE — 99232 SBSQ HOSP IP/OBS MODERATE 35: CPT | Performed by: FAMILY MEDICINE

## 2018-05-02 PROCEDURE — 84100 ASSAY OF PHOSPHORUS: CPT

## 2018-05-02 PROCEDURE — 85027 COMPLETE CBC AUTOMATED: CPT

## 2018-05-02 PROCEDURE — 2580000003 HC RX 258: Performed by: PHYSICIAN ASSISTANT

## 2018-05-02 PROCEDURE — 36415 COLL VENOUS BLD VENIPUNCTURE: CPT

## 2018-05-02 PROCEDURE — 6370000000 HC RX 637 (ALT 250 FOR IP): Performed by: PHYSICIAN ASSISTANT

## 2018-05-02 PROCEDURE — 6370000000 HC RX 637 (ALT 250 FOR IP): Performed by: FAMILY MEDICINE

## 2018-05-02 PROCEDURE — 1200000000 HC SEMI PRIVATE

## 2018-05-02 RX ADMIN — Medication 10 ML: at 20:45

## 2018-05-02 RX ADMIN — DOFETILIDE 250 MCG: 0.25 CAPSULE ORAL at 08:46

## 2018-05-02 RX ADMIN — DOFETILIDE 250 MCG: 0.25 CAPSULE ORAL at 20:44

## 2018-05-02 RX ADMIN — APIXABAN 5 MG: 5 TABLET, FILM COATED ORAL at 20:44

## 2018-05-02 RX ADMIN — ASPIRIN 81 MG: 81 TABLET, COATED ORAL at 08:46

## 2018-05-02 RX ADMIN — MULTIPLE VITAMINS W/ MINERALS TAB 1 TABLET: TAB at 08:46

## 2018-05-02 RX ADMIN — PANTOPRAZOLE SODIUM 40 MG: 40 TABLET, DELAYED RELEASE ORAL at 07:58

## 2018-05-02 RX ADMIN — APIXABAN 5 MG: 5 TABLET, FILM COATED ORAL at 08:45

## 2018-05-02 RX ADMIN — DOCUSATE SODIUM 200 MG: 100 CAPSULE, LIQUID FILLED ORAL at 20:44

## 2018-05-02 RX ADMIN — Medication 400 MG: at 08:46

## 2018-05-02 RX ADMIN — Medication 1 CAPSULE: at 08:46

## 2018-05-02 RX ADMIN — VITAMIN D, TAB 1000IU (100/BT) 1000 UNITS: 25 TAB at 20:44

## 2018-05-02 RX ADMIN — Medication 10 ML: at 08:48

## 2018-05-02 ASSESSMENT — PAIN SCALES - GENERAL
PAINLEVEL_OUTOF10: 0

## 2018-05-03 LAB
ANION GAP SERPL CALCULATED.3IONS-SCNC: 7 MMOL/L (ref 9–17)
BUN BLDV-MCNC: 16 MG/DL (ref 8–23)
BUN/CREAT BLD: 24 (ref 9–20)
CALCIUM SERPL-MCNC: 8.9 MG/DL (ref 8.6–10.4)
CHLORIDE BLD-SCNC: 103 MMOL/L (ref 98–107)
CO2: 30 MMOL/L (ref 20–31)
CREAT SERPL-MCNC: 0.66 MG/DL (ref 0.5–0.9)
GFR AFRICAN AMERICAN: >60 ML/MIN
GFR NON-AFRICAN AMERICAN: >60 ML/MIN
GFR SERPL CREATININE-BSD FRML MDRD: ABNORMAL ML/MIN/{1.73_M2}
GFR SERPL CREATININE-BSD FRML MDRD: ABNORMAL ML/MIN/{1.73_M2}
GLUCOSE BLD-MCNC: 90 MG/DL (ref 70–99)
HCT VFR BLD CALC: 35.4 % (ref 36.3–47.1)
HEMOGLOBIN: 11.9 G/DL (ref 11.9–15.1)
MAGNESIUM: 2 MG/DL (ref 1.6–2.6)
MCH RBC QN AUTO: 33.5 PG (ref 25.2–33.5)
MCHC RBC AUTO-ENTMCNC: 33.6 G/DL (ref 28.4–34.8)
MCV RBC AUTO: 99.7 FL (ref 82.6–102.9)
NRBC AUTOMATED: 0 PER 100 WBC
PDW BLD-RTO: 12.6 % (ref 11.8–14.4)
PHOSPHORUS: 4 MG/DL (ref 2.6–4.5)
PLATELET # BLD: 152 K/UL (ref 138–453)
PMV BLD AUTO: 9.7 FL (ref 8.1–13.5)
POTASSIUM SERPL-SCNC: 4.1 MMOL/L (ref 3.7–5.3)
RBC # BLD: 3.55 M/UL (ref 3.95–5.11)
SODIUM BLD-SCNC: 140 MMOL/L (ref 135–144)
WBC # BLD: 3.5 K/UL (ref 3.5–11.3)

## 2018-05-03 PROCEDURE — 84100 ASSAY OF PHOSPHORUS: CPT

## 2018-05-03 PROCEDURE — 93005 ELECTROCARDIOGRAM TRACING: CPT

## 2018-05-03 PROCEDURE — 2580000003 HC RX 258: Performed by: PHYSICIAN ASSISTANT

## 2018-05-03 PROCEDURE — 36415 COLL VENOUS BLD VENIPUNCTURE: CPT

## 2018-05-03 PROCEDURE — 1200000000 HC SEMI PRIVATE

## 2018-05-03 PROCEDURE — 85027 COMPLETE CBC AUTOMATED: CPT

## 2018-05-03 PROCEDURE — 6370000000 HC RX 637 (ALT 250 FOR IP): Performed by: INTERNAL MEDICINE

## 2018-05-03 PROCEDURE — 83735 ASSAY OF MAGNESIUM: CPT

## 2018-05-03 PROCEDURE — 6370000000 HC RX 637 (ALT 250 FOR IP): Performed by: FAMILY MEDICINE

## 2018-05-03 PROCEDURE — 80048 BASIC METABOLIC PNL TOTAL CA: CPT

## 2018-05-03 PROCEDURE — 99233 SBSQ HOSP IP/OBS HIGH 50: CPT | Performed by: FAMILY MEDICINE

## 2018-05-03 PROCEDURE — 6370000000 HC RX 637 (ALT 250 FOR IP): Performed by: PHYSICIAN ASSISTANT

## 2018-05-03 RX ADMIN — APIXABAN 5 MG: 5 TABLET, FILM COATED ORAL at 08:58

## 2018-05-03 RX ADMIN — Medication 10 ML: at 09:00

## 2018-05-03 RX ADMIN — Medication 10 ML: at 20:03

## 2018-05-03 RX ADMIN — APIXABAN 5 MG: 5 TABLET, FILM COATED ORAL at 20:02

## 2018-05-03 RX ADMIN — VITAMIN D, TAB 1000IU (100/BT) 1000 UNITS: 25 TAB at 20:01

## 2018-05-03 RX ADMIN — PANTOPRAZOLE SODIUM 40 MG: 40 TABLET, DELAYED RELEASE ORAL at 06:53

## 2018-05-03 RX ADMIN — ASPIRIN 81 MG: 81 TABLET, COATED ORAL at 08:57

## 2018-05-03 RX ADMIN — DOFETILIDE 250 MCG: 0.25 CAPSULE ORAL at 08:58

## 2018-05-03 RX ADMIN — Medication 400 MG: at 08:57

## 2018-05-03 RX ADMIN — DOFETILIDE 250 MCG: 0.25 CAPSULE ORAL at 20:02

## 2018-05-03 RX ADMIN — Medication 1 CAPSULE: at 08:57

## 2018-05-03 RX ADMIN — DOCUSATE SODIUM 200 MG: 100 CAPSULE, LIQUID FILLED ORAL at 20:01

## 2018-05-03 RX ADMIN — MULTIPLE VITAMINS W/ MINERALS TAB 1 TABLET: TAB at 08:57

## 2018-05-03 ASSESSMENT — PAIN SCALES - GENERAL
PAINLEVEL_OUTOF10: 0
PAINLEVEL_OUTOF10: 0

## 2018-05-04 VITALS
TEMPERATURE: 96.9 F | RESPIRATION RATE: 16 BRPM | BODY MASS INDEX: 25.81 KG/M2 | WEIGHT: 151.2 LBS | HEART RATE: 45 BPM | SYSTOLIC BLOOD PRESSURE: 106 MMHG | HEIGHT: 64 IN | OXYGEN SATURATION: 97 % | DIASTOLIC BLOOD PRESSURE: 70 MMHG

## 2018-05-04 LAB
ANION GAP SERPL CALCULATED.3IONS-SCNC: 8 MMOL/L (ref 9–17)
BUN BLDV-MCNC: 16 MG/DL (ref 8–23)
BUN/CREAT BLD: 25 (ref 9–20)
CALCIUM SERPL-MCNC: 8.9 MG/DL (ref 8.6–10.4)
CHLORIDE BLD-SCNC: 103 MMOL/L (ref 98–107)
CO2: 31 MMOL/L (ref 20–31)
CREAT SERPL-MCNC: 0.64 MG/DL (ref 0.5–0.9)
EKG ATRIAL RATE: 47 BPM
EKG ATRIAL RATE: 48 BPM
EKG ATRIAL RATE: 49 BPM
EKG P AXIS: 81 DEGREES
EKG P AXIS: 90 DEGREES
EKG P AXIS: 92 DEGREES
EKG P-R INTERVAL: 148 MS
EKG P-R INTERVAL: 154 MS
EKG P-R INTERVAL: 174 MS
EKG Q-T INTERVAL: 474 MS
EKG Q-T INTERVAL: 486 MS
EKG Q-T INTERVAL: 512 MS
EKG QRS DURATION: 76 MS
EKG QRS DURATION: 86 MS
EKG QRS DURATION: 88 MS
EKG QTC CALCULATION (BAZETT): 423 MS
EKG QTC CALCULATION (BAZETT): 430 MS
EKG QTC CALCULATION (BAZETT): 462 MS
EKG R AXIS: 17 DEGREES
EKG R AXIS: 30 DEGREES
EKG R AXIS: 32 DEGREES
EKG T AXIS: 29 DEGREES
EKG T AXIS: 42 DEGREES
EKG T AXIS: 49 DEGREES
EKG VENTRICULAR RATE: 47 BPM
EKG VENTRICULAR RATE: 48 BPM
EKG VENTRICULAR RATE: 49 BPM
GFR AFRICAN AMERICAN: >60 ML/MIN
GFR NON-AFRICAN AMERICAN: >60 ML/MIN
GFR SERPL CREATININE-BSD FRML MDRD: ABNORMAL ML/MIN/{1.73_M2}
GFR SERPL CREATININE-BSD FRML MDRD: ABNORMAL ML/MIN/{1.73_M2}
GLUCOSE BLD-MCNC: 89 MG/DL (ref 70–99)
HCT VFR BLD CALC: 35.9 % (ref 36.3–47.1)
HEMOGLOBIN: 12.1 G/DL (ref 11.9–15.1)
MAGNESIUM: 2 MG/DL (ref 1.6–2.6)
MCH RBC QN AUTO: 33.8 PG (ref 25.2–33.5)
MCHC RBC AUTO-ENTMCNC: 33.7 G/DL (ref 28.4–34.8)
MCV RBC AUTO: 100.3 FL (ref 82.6–102.9)
NRBC AUTOMATED: 0 PER 100 WBC
PDW BLD-RTO: 12.4 % (ref 11.8–14.4)
PHOSPHORUS: 4 MG/DL (ref 2.6–4.5)
PLATELET # BLD: 152 K/UL (ref 138–453)
PMV BLD AUTO: 9.6 FL (ref 8.1–13.5)
POTASSIUM SERPL-SCNC: 4.2 MMOL/L (ref 3.7–5.3)
RBC # BLD: 3.58 M/UL (ref 3.95–5.11)
SODIUM BLD-SCNC: 142 MMOL/L (ref 135–144)
WBC # BLD: 3.5 K/UL (ref 3.5–11.3)

## 2018-05-04 PROCEDURE — 84100 ASSAY OF PHOSPHORUS: CPT

## 2018-05-04 PROCEDURE — 36415 COLL VENOUS BLD VENIPUNCTURE: CPT

## 2018-05-04 PROCEDURE — 6370000000 HC RX 637 (ALT 250 FOR IP): Performed by: INTERNAL MEDICINE

## 2018-05-04 PROCEDURE — 83735 ASSAY OF MAGNESIUM: CPT

## 2018-05-04 PROCEDURE — 99233 SBSQ HOSP IP/OBS HIGH 50: CPT | Performed by: FAMILY MEDICINE

## 2018-05-04 PROCEDURE — 80048 BASIC METABOLIC PNL TOTAL CA: CPT

## 2018-05-04 PROCEDURE — 85027 COMPLETE CBC AUTOMATED: CPT

## 2018-05-04 PROCEDURE — 6370000000 HC RX 637 (ALT 250 FOR IP): Performed by: FAMILY MEDICINE

## 2018-05-04 PROCEDURE — 93005 ELECTROCARDIOGRAM TRACING: CPT

## 2018-05-04 PROCEDURE — 6370000000 HC RX 637 (ALT 250 FOR IP): Performed by: PHYSICIAN ASSISTANT

## 2018-05-04 RX ORDER — DOFETILIDE 0.25 MG/1
500 CAPSULE ORAL 2 TIMES DAILY
Qty: 180 CAPSULE | Refills: 3 | Status: SHIPPED | OUTPATIENT
Start: 2018-05-04 | End: 2018-05-07 | Stop reason: DRUGHIGH

## 2018-05-04 RX ADMIN — ASPIRIN 81 MG: 81 TABLET, COATED ORAL at 08:18

## 2018-05-04 RX ADMIN — Medication 1 CAPSULE: at 08:18

## 2018-05-04 RX ADMIN — APIXABAN 5 MG: 5 TABLET, FILM COATED ORAL at 08:19

## 2018-05-04 RX ADMIN — PANTOPRAZOLE SODIUM 40 MG: 40 TABLET, DELAYED RELEASE ORAL at 08:18

## 2018-05-04 RX ADMIN — DOFETILIDE 250 MCG: 0.25 CAPSULE ORAL at 08:19

## 2018-05-04 RX ADMIN — MULTIPLE VITAMINS W/ MINERALS TAB 1 TABLET: TAB at 08:18

## 2018-05-04 RX ADMIN — Medication 400 MG: at 08:18

## 2018-05-04 ASSESSMENT — PAIN SCALES - GENERAL
PAINLEVEL_OUTOF10: 0
PAINLEVEL_OUTOF10: 1

## 2018-05-04 ASSESSMENT — PAIN DESCRIPTION - LOCATION: LOCATION: HEAD

## 2018-05-04 ASSESSMENT — PAIN DESCRIPTION - PAIN TYPE: TYPE: ACUTE PAIN

## 2018-05-05 LAB
EKG ATRIAL RATE: 52 BPM
EKG P AXIS: 86 DEGREES
EKG P-R INTERVAL: 154 MS
EKG Q-T INTERVAL: 472 MS
EKG QRS DURATION: 88 MS
EKG QTC CALCULATION (BAZETT): 438 MS
EKG R AXIS: 27 DEGREES
EKG T AXIS: 49 DEGREES
EKG VENTRICULAR RATE: 52 BPM

## 2018-05-07 ENCOUNTER — TELEPHONE (OUTPATIENT)
Dept: PHARMACY | Facility: CLINIC | Age: 78
End: 2018-05-07

## 2018-05-07 RX ORDER — DOFETILIDE 0.25 MG/1
250 CAPSULE ORAL 2 TIMES DAILY
COMMUNITY
End: 2019-03-05 | Stop reason: SDUPTHER

## 2018-06-15 ENCOUNTER — OFFICE VISIT (OUTPATIENT)
Dept: CARDIOLOGY | Age: 78
End: 2018-06-15
Payer: MEDICARE

## 2018-06-15 VITALS
OXYGEN SATURATION: 97 % | WEIGHT: 150 LBS | DIASTOLIC BLOOD PRESSURE: 72 MMHG | BODY MASS INDEX: 26.58 KG/M2 | HEART RATE: 57 BPM | HEIGHT: 63 IN | RESPIRATION RATE: 16 BRPM | SYSTOLIC BLOOD PRESSURE: 120 MMHG

## 2018-06-15 DIAGNOSIS — Z79.899 VISIT FOR MONITORING TIKOSYN THERAPY: ICD-10-CM

## 2018-06-15 DIAGNOSIS — I34.0 MODERATE MITRAL REGURGITATION: ICD-10-CM

## 2018-06-15 DIAGNOSIS — I48.0 PAROXYSMAL ATRIAL FIBRILLATION (HCC): Primary | ICD-10-CM

## 2018-06-15 DIAGNOSIS — Z51.81 VISIT FOR MONITORING TIKOSYN THERAPY: ICD-10-CM

## 2018-06-15 PROCEDURE — 93000 ELECTROCARDIOGRAM COMPLETE: CPT | Performed by: FAMILY MEDICINE

## 2018-06-15 PROCEDURE — 99214 OFFICE O/P EST MOD 30 MIN: CPT | Performed by: FAMILY MEDICINE

## 2018-07-02 ENCOUNTER — TELEPHONE (OUTPATIENT)
Dept: CARDIOLOGY CLINIC | Age: 78
End: 2018-07-02

## 2018-07-02 NOTE — TELEPHONE ENCOUNTER
Please let patient know that she should definitely be taking this 2x/day. This is also how it is in our records. If she needs a new script written let us know but this medication is only given 2x/day.

## 2018-07-02 NOTE — TELEPHONE ENCOUNTER
Pt phoned, picked up dofetilide script @ [facility]. Was written 250 mg bid. Pt states only take qd. Please advise.   Thanks

## 2018-07-12 ENCOUNTER — HOSPITAL ENCOUNTER (OUTPATIENT)
Dept: GENERAL RADIOLOGY | Age: 78
Discharge: HOME | End: 2018-07-14
Payer: MEDICARE

## 2018-07-12 ENCOUNTER — HOSPITAL ENCOUNTER (OUTPATIENT)
Age: 78
Discharge: HOME | End: 2018-07-14
Payer: MEDICARE

## 2018-07-12 DIAGNOSIS — M25.512 ACUTE PAIN OF LEFT SHOULDER: ICD-10-CM

## 2018-07-12 PROCEDURE — 73030 X-RAY EXAM OF SHOULDER: CPT

## 2018-12-07 ENCOUNTER — OFFICE VISIT (OUTPATIENT)
Dept: CARDIOLOGY | Age: 78
End: 2018-12-07
Payer: MEDICARE

## 2018-12-07 ENCOUNTER — HOSPITAL ENCOUNTER (OUTPATIENT)
Age: 78
Discharge: HOME | End: 2018-12-07
Payer: MEDICARE

## 2018-12-07 VITALS
OXYGEN SATURATION: 95 % | HEART RATE: 57 BPM | RESPIRATION RATE: 14 BRPM | BODY MASS INDEX: 25.78 KG/M2 | HEIGHT: 64 IN | DIASTOLIC BLOOD PRESSURE: 66 MMHG | WEIGHT: 151 LBS | SYSTOLIC BLOOD PRESSURE: 118 MMHG

## 2018-12-07 DIAGNOSIS — I34.0 MODERATE MITRAL REGURGITATION: ICD-10-CM

## 2018-12-07 DIAGNOSIS — I48.0 PAF (PAROXYSMAL ATRIAL FIBRILLATION) (HCC): ICD-10-CM

## 2018-12-07 DIAGNOSIS — Z51.81 VISIT FOR MONITORING TIKOSYN THERAPY: ICD-10-CM

## 2018-12-07 DIAGNOSIS — Z79.899 VISIT FOR MONITORING TIKOSYN THERAPY: ICD-10-CM

## 2018-12-07 DIAGNOSIS — Z79.01 LONG TERM (CURRENT) USE OF ANTICOAGULANTS: ICD-10-CM

## 2018-12-07 DIAGNOSIS — I48.0 PAF (PAROXYSMAL ATRIAL FIBRILLATION) (HCC): Primary | ICD-10-CM

## 2018-12-07 LAB
ALBUMIN SERPL-MCNC: 3.9 G/DL (ref 3.5–5.2)
ALBUMIN/GLOBULIN RATIO: 1.4 (ref 1–2.5)
ALP BLD-CCNC: 98 U/L (ref 35–104)
ALT SERPL-CCNC: 20 U/L (ref 5–33)
ANION GAP SERPL CALCULATED.3IONS-SCNC: 8 MMOL/L (ref 9–17)
AST SERPL-CCNC: 26 U/L
BILIRUB SERPL-MCNC: 0.26 MG/DL (ref 0.3–1.2)
BUN BLDV-MCNC: 17 MG/DL (ref 8–23)
BUN/CREAT BLD: 26 (ref 9–20)
CALCIUM SERPL-MCNC: 8.9 MG/DL (ref 8.6–10.4)
CHLORIDE BLD-SCNC: 105 MMOL/L (ref 98–107)
CO2: 29 MMOL/L (ref 20–31)
CREAT SERPL-MCNC: 0.66 MG/DL (ref 0.5–0.9)
ESTIMATED AVERAGE GLUCOSE: 117 MG/DL
GFR AFRICAN AMERICAN: >60 ML/MIN
GFR NON-AFRICAN AMERICAN: >60 ML/MIN
GFR SERPL CREATININE-BSD FRML MDRD: ABNORMAL ML/MIN/{1.73_M2}
GFR SERPL CREATININE-BSD FRML MDRD: ABNORMAL ML/MIN/{1.73_M2}
GLUCOSE BLD-MCNC: 83 MG/DL (ref 70–99)
HBA1C MFR BLD: 5.7 % (ref 4.8–5.9)
HCT VFR BLD CALC: 39.8 % (ref 36.3–47.1)
HEMOGLOBIN: 12.6 G/DL (ref 11.9–15.1)
MCH RBC QN AUTO: 33.9 PG (ref 25.2–33.5)
MCHC RBC AUTO-ENTMCNC: 31.7 G/DL (ref 28.4–34.8)
MCV RBC AUTO: 107 FL (ref 82.6–102.9)
NRBC AUTOMATED: 0 PER 100 WBC
PDW BLD-RTO: 12.8 % (ref 11.8–14.4)
PLATELET # BLD: 172 K/UL (ref 138–453)
PMV BLD AUTO: 9.9 FL (ref 8.1–13.5)
POTASSIUM SERPL-SCNC: 4.3 MMOL/L (ref 3.7–5.3)
RBC # BLD: 3.72 M/UL (ref 3.95–5.11)
SODIUM BLD-SCNC: 142 MMOL/L (ref 135–144)
TOTAL PROTEIN: 6.6 G/DL (ref 6.4–8.3)
WBC # BLD: 4 K/UL (ref 3.5–11.3)

## 2018-12-07 PROCEDURE — 83036 HEMOGLOBIN GLYCOSYLATED A1C: CPT

## 2018-12-07 PROCEDURE — 80053 COMPREHEN METABOLIC PANEL: CPT

## 2018-12-07 PROCEDURE — 93000 ELECTROCARDIOGRAM COMPLETE: CPT | Performed by: FAMILY MEDICINE

## 2018-12-07 PROCEDURE — 85027 COMPLETE CBC AUTOMATED: CPT

## 2018-12-07 PROCEDURE — 36415 COLL VENOUS BLD VENIPUNCTURE: CPT

## 2018-12-07 PROCEDURE — 99213 OFFICE O/P EST LOW 20 MIN: CPT | Performed by: FAMILY MEDICINE

## 2018-12-10 ENCOUNTER — TELEPHONE (OUTPATIENT)
Dept: CARDIOLOGY | Age: 78
End: 2018-12-10

## 2019-01-14 DIAGNOSIS — I48.0 PAROXYSMAL ATRIAL FIBRILLATION (HCC): ICD-10-CM

## 2019-03-05 RX ORDER — DOFETILIDE 0.25 MG/1
250 CAPSULE ORAL 2 TIMES DAILY
Qty: 180 CAPSULE | Refills: 3 | Status: SHIPPED | OUTPATIENT
Start: 2019-03-05 | End: 2020-02-25 | Stop reason: SDUPTHER

## 2019-03-12 ENCOUNTER — HOSPITAL ENCOUNTER (OUTPATIENT)
Dept: GENERAL RADIOLOGY | Age: 79
Discharge: HOME | End: 2019-03-14
Payer: MEDICARE

## 2019-03-12 ENCOUNTER — HOSPITAL ENCOUNTER (OUTPATIENT)
Age: 79
Discharge: HOME | End: 2019-03-14
Payer: MEDICARE

## 2019-03-12 DIAGNOSIS — M25.561 ACUTE PAIN OF RIGHT KNEE: ICD-10-CM

## 2019-03-12 PROCEDURE — 73560 X-RAY EXAM OF KNEE 1 OR 2: CPT

## 2019-06-10 ENCOUNTER — OFFICE VISIT (OUTPATIENT)
Dept: CARDIOLOGY | Age: 79
End: 2019-06-10
Payer: MEDICARE

## 2019-06-10 VITALS
OXYGEN SATURATION: 96 % | WEIGHT: 151.8 LBS | HEIGHT: 64 IN | HEART RATE: 60 BPM | SYSTOLIC BLOOD PRESSURE: 112 MMHG | DIASTOLIC BLOOD PRESSURE: 64 MMHG | BODY MASS INDEX: 25.92 KG/M2

## 2019-06-10 DIAGNOSIS — Z79.899 VISIT FOR MONITORING TIKOSYN THERAPY: ICD-10-CM

## 2019-06-10 DIAGNOSIS — I34.0 MODERATE MITRAL REGURGITATION: ICD-10-CM

## 2019-06-10 DIAGNOSIS — I48.0 PAF (PAROXYSMAL ATRIAL FIBRILLATION) (HCC): Primary | ICD-10-CM

## 2019-06-10 DIAGNOSIS — Z51.81 VISIT FOR MONITORING TIKOSYN THERAPY: ICD-10-CM

## 2019-06-10 DIAGNOSIS — R00.1 BRADYCARDIA: ICD-10-CM

## 2019-06-10 PROCEDURE — 93000 ELECTROCARDIOGRAM COMPLETE: CPT | Performed by: FAMILY MEDICINE

## 2019-06-10 PROCEDURE — 99213 OFFICE O/P EST LOW 20 MIN: CPT | Performed by: FAMILY MEDICINE

## 2019-06-10 NOTE — PATIENT INSTRUCTIONS
SURVEY:    You may be receiving a survey from [email or web address] regarding your visit today. Please complete the survey to enable us to provide the highest quality of care to you and your family. If you cannot score us a very good on any question, please call the office to discuss how we could have made your experience a very good one. Thank you.

## 2019-06-10 NOTE — PROGRESS NOTES
I, Loly Waters am scribing for and in the presence of Reema Cat MD.      Patient: Jodi Pinzon  : 1940  Date of Visit: Danielle 10, 2019    REASON FOR VISIT / CONSULTATION: 6 Month Follow-Up (HX: PAF, HTN, Moderate mitral regurg. Pt states she has been doing well. Occasioanl minimal SOB, unchanged. Palpitations infrequently lasting 15 minutes or so, not bothersome and unchanged. Denies: CP, lightheadedness/dizziness. )      Dear Patricia Lai MD,    I had the pleasure of seeing Jodi Pinzon today. Ms. Simone Magallanes is a 66 y.o. female with a history of paroxysmal atrial fibrillation. She reports that this 1st occurred a few year ago and most recently occurred first part of February  but she says she has always had some degree of palpitations. Her last previous sustained event was about a year ago. She also has a history of mitral valve prolapse but denies any heart attacks, other cardiac issues or stroke or mini stroke. She underwent a cardiovascular stress test on 2018 that had show to be consistent with low to intermediate risk of coronary artery disease. Her cardiac catheterization on 2018 had shown mild irregularities with the worst being in her LAD at 20-30%. She was also admitted to the hospital for tikosyn loading on 2018 and has been maintained on this ever since. Since the last time I saw Ms. Springer, she continues to do well. She denied any chest pain or discomfort, increased shortness of breath, abdominal pain, bleeding problems, problems with her medications or any other concerns at this time. She denies any known repeat episodes of atrial fibrillation. Exercise Tolerance: She reports having a a good exercise tolerance. Ms. Simone Magallanes says that she can walk a couple of blocks without developing chest discomfort and/or significant shortness of breath. She also denies any lightheadedness or dizziness.     Past Medical History:   Diagnosis Date    Essential hypertension     F[address]ure of [address]rum ([facility] [address] [facility] 2011    GERD (gastroesophageal reflux disease)     H/O cardiovascular stress test 02/28/2018    Equivocal myocardial perfusion study. There is a small/moderate perufsion defect of mild intensity in the apical an anteroapical regions during stress imaging which is most consistent with artifact but may be due to a small degree of coronary ischemia. Overall, these results are most consistent with a low/intermediate risk for CAD.     H/O echocardiogram 02/02/2018    EF 60%. Mildly increased left ventricular wall thickness. No definite specific wall motion abnormalities. Bi-atrial enlargment. Myxomatous thickening of the mitral leaflets with bileaflet prolapse and moderat mitral regurg. Moderate tricuspid regurg. Mild pulmonary HTN with an estimated right ventricualr systolic pressure 52MHCB.  History of Holter monitoring 2016    PACs & PVCs    Irritable bowel syndrome     Mitral valve prolapse     Osteoarthritis     Osteopenia     Paroxysmal atrial fibrillation (HCC) 2018    Rosacea     Tendinitis of left shoulder        CURRENT ALLERGIES: Butalbital-aspirin-caffeine and Other REVIEW OF SYSTEMS: 14 systems were reviewed. Pertinent positives and negatives as above, all else negative.      Past Surgical History:   Procedure Laterality Date    BUNIONECTOMY      COLONOSCOPY  2008    CYSTOCELE REPAIR      HERNIA REPAIR      MOHS SURGERY  2008    LUBA AND BSO Bilateral 2014    VAGINA SURGERY  2003    Sling    VEIN SURGERY      Social History:  Social History     Tobacco Use    Smoking status: Never Smoker    Smokeless tobacco: Never Used   Substance Use Topics    Alcohol use: Yes     Comment: occasionally    Drug use: No        CURRENT MEDICATIONS:  Outpatient Medications Marked as Taking for the 6/10/19 encounter (Office Visit) with Miguel Ribeiro MD   Medication Sig Dispense Refill    dofetilide (TIKOSYN) 250 MCG capsule Take 1 capsule by mouth 2 times daily 180 capsule 3    ml/m2.  · Beta Blocker Therapy: Not indicated due to bradycardia           Bradycardia: Asymptomatic: No falls, near falls, or lightheadedness/dizziness. I told her to call if she develops lightheadedness or dizziness as at some point she may very well need a pacemaker but hopefully not. · Beta Blocker: Not indicated at this time. · Calcium Channel Blocker: Not indicated at this time. · Additional Testing List: None    Finally, I recommended that she continue her other medications and follow up with you as previously scheduled. FOLLOW UP:   I told Ms. Springer to call my office if she had any problems, but otherwise I asked her to Return in about 6 months (around 12/10/2019). However, I would be happy to see her sooner should the need arise. Sincerely,  Luis Birmingham. Renato MARTIN, MS, F.A.C.C. [facility] Cardiology Specialist    [address] [place] [address]  Phone: [phone], Fax: [phone]     I believe that the risk of significant morbidity and mortality related to the patient's current medical conditions are: Intermediate. The documentation recorded by the scribe, accurately and completely reflects the services I personally performed and the decisions made by me. Nghia Ovalles MD, MS, F.A.C.C.  Danielle 10, 2019

## 2019-12-06 PROBLEM — R10.13 DYSPEPSIA: Status: ACTIVE | Noted: 2019-12-06

## 2019-12-09 ENCOUNTER — ANESTHESIA EVENT (OUTPATIENT)
Dept: OPERATING ROOM | Age: 79
End: 2019-12-09
Payer: MEDICARE

## 2019-12-10 ENCOUNTER — HOSPITAL ENCOUNTER (OUTPATIENT)
Age: 79
Setting detail: OUTPATIENT SURGERY
Discharge: HOME | End: 2019-12-10
Attending: INTERNAL MEDICINE | Admitting: INTERNAL MEDICINE
Payer: MEDICARE

## 2019-12-10 ENCOUNTER — ANESTHESIA (OUTPATIENT)
Dept: OPERATING ROOM | Age: 79
End: 2019-12-10
Payer: MEDICARE

## 2019-12-10 VITALS
RESPIRATION RATE: 18 BRPM | OXYGEN SATURATION: 97 % | DIASTOLIC BLOOD PRESSURE: 68 MMHG | SYSTOLIC BLOOD PRESSURE: 93 MMHG

## 2019-12-10 VITALS
HEART RATE: 71 BPM | SYSTOLIC BLOOD PRESSURE: 109 MMHG | TEMPERATURE: 97.1 F | BODY MASS INDEX: 25.27 KG/M2 | WEIGHT: 148 LBS | HEIGHT: 64 IN | OXYGEN SATURATION: 94 % | DIASTOLIC BLOOD PRESSURE: 73 MMHG | RESPIRATION RATE: 18 BRPM

## 2019-12-10 PROBLEM — R13.19 OTHER DYSPHAGIA: Status: ACTIVE | Noted: 2019-12-10

## 2019-12-10 PROCEDURE — 43239 EGD BIOPSY SINGLE/MULTIPLE: CPT | Performed by: INTERNAL MEDICINE

## 2019-12-10 PROCEDURE — 3609012400 HC EGD TRANSORAL BIOPSY SINGLE/MULTIPLE: Performed by: INTERNAL MEDICINE

## 2019-12-10 PROCEDURE — 43248 EGD GUIDE WIRE INSERTION: CPT | Performed by: INTERNAL MEDICINE

## 2019-12-10 PROCEDURE — 2709999900 HC NON-CHARGEABLE SUPPLY: Performed by: INTERNAL MEDICINE

## 2019-12-10 PROCEDURE — 6360000002 HC RX W HCPCS: Performed by: NURSE ANESTHETIST, CERTIFIED REGISTERED

## 2019-12-10 PROCEDURE — 3609017700 HC EGD DILATION GASTRIC/DUODENAL STRICTURE: Performed by: INTERNAL MEDICINE

## 2019-12-10 PROCEDURE — 2580000003 HC RX 258: Performed by: INTERNAL MEDICINE

## 2019-12-10 PROCEDURE — 2500000003 HC RX 250 WO HCPCS: Performed by: NURSE ANESTHETIST, CERTIFIED REGISTERED

## 2019-12-10 PROCEDURE — 3700000001 HC ADD 15 MINUTES (ANESTHESIA): Performed by: INTERNAL MEDICINE

## 2019-12-10 PROCEDURE — 88305 TISSUE EXAM BY PATHOLOGIST: CPT

## 2019-12-10 PROCEDURE — 7100000010 HC PHASE II RECOVERY - FIRST 15 MIN: Performed by: INTERNAL MEDICINE

## 2019-12-10 PROCEDURE — 3700000000 HC ANESTHESIA ATTENDED CARE: Performed by: INTERNAL MEDICINE

## 2019-12-10 PROCEDURE — 7100000011 HC PHASE II RECOVERY - ADDTL 15 MIN: Performed by: INTERNAL MEDICINE

## 2019-12-10 RX ORDER — GLYCOPYRROLATE 1 MG/5 ML
SYRINGE (ML) INTRAVENOUS PRN
Status: DISCONTINUED | OUTPATIENT
Start: 2019-12-10 | End: 2019-12-10 | Stop reason: SDUPTHER

## 2019-12-10 RX ORDER — PROPOFOL 10 MG/ML
INJECTION, EMULSION INTRAVENOUS PRN
Status: DISCONTINUED | OUTPATIENT
Start: 2019-12-10 | End: 2019-12-10 | Stop reason: SDUPTHER

## 2019-12-10 RX ORDER — LIDOCAINE HYDROCHLORIDE 20 MG/ML
INJECTION, SOLUTION EPIDURAL; INFILTRATION; INTRACAUDAL; PERINEURAL PRN
Status: DISCONTINUED | OUTPATIENT
Start: 2019-12-10 | End: 2019-12-10 | Stop reason: SDUPTHER

## 2019-12-10 RX ORDER — PROPOFOL 10 MG/ML
INJECTION, EMULSION INTRAVENOUS CONTINUOUS PRN
Status: DISCONTINUED | OUTPATIENT
Start: 2019-12-10 | End: 2019-12-10 | Stop reason: SDUPTHER

## 2019-12-10 RX ORDER — SODIUM CHLORIDE, SODIUM LACTATE, POTASSIUM CHLORIDE, CALCIUM CHLORIDE 600; 310; 30; 20 MG/100ML; MG/100ML; MG/100ML; MG/100ML
INJECTION, SOLUTION INTRAVENOUS CONTINUOUS
Status: DISCONTINUED | OUTPATIENT
Start: 2019-12-10 | End: 2019-12-10 | Stop reason: HOSPADM

## 2019-12-10 RX ADMIN — PROPOFOL 80 MG: 10 INJECTION, EMULSION INTRAVENOUS at 08:56

## 2019-12-10 RX ADMIN — LIDOCAINE HYDROCHLORIDE 60 MG: 20 INJECTION, SOLUTION EPIDURAL; INFILTRATION; INTRACAUDAL at 08:56

## 2019-12-10 RX ADMIN — Medication 0.4 MG: at 09:03

## 2019-12-10 RX ADMIN — PROPOFOL 150 MCG/KG/MIN: 10 INJECTION, EMULSION INTRAVENOUS at 08:56

## 2019-12-10 RX ADMIN — SODIUM CHLORIDE, POTASSIUM CHLORIDE, SODIUM LACTATE AND CALCIUM CHLORIDE: 600; 310; 30; 20 INJECTION, SOLUTION INTRAVENOUS at 08:53

## 2019-12-10 ASSESSMENT — PAIN - FUNCTIONAL ASSESSMENT: PAIN_FUNCTIONAL_ASSESSMENT: 0-10

## 2019-12-10 ASSESSMENT — PAIN SCALES - GENERAL: PAINLEVEL_OUTOF10: 0

## 2019-12-10 NOTE — H&P
History and Physical    Patient's Name/Date of Birth: Justin Ramirez / 1940 (41 y.o.)    Date: December 10, 2019     CHIEF COMPLAINT:  Dysphagia    Past Medical History:   Diagnosis Date    Essential hypertension     Fracture of sacrum ([address] 2011    GERD (gastroesophageal reflux disease)     H/O cardiovascular stress test 02/28/2018    Equivocal myocardial perfusion study. There is a small/moderate perufsion defect of mild intensity in the apical an anteroapical regions during stress imaging which is most consistent with artifact but may be due to a small degree of coronary ischemia. Overall, these results are most consistent with a low/intermediate risk for CAD.     H/O echocardiogram 02/02/2018    EF 60%. Mildly increased left ventricular wall thickness. No definite specific wall motion abnormalities. Bi-atrial enlargment. Myxomatous thickening of the mitral leaflets with bileaflet prolapse and moderat mitral regurg. Moderate tricuspid regurg. Mild pulmonary HTN with an estimated right ventricualr systolic pressure 77YNFR.      History of Holter monitoring 2016    PACs & PVCs    Irritable bowel syndrome     Mitral valve prolapse     Osteoarthritis     Osteopenia     Paroxysmal atrial fibrillation (HCC) 2018    Rosacea     Tendinitis of left shoulder      Past Surgical History:   Procedure Laterality Date    BUNIONECTOMY      COLONOSCOPY  2008    CYSTOCELE REPAIR      HERNIA REPAIR      MOHS SURGERY  2008    LUBA AND BSO Bilateral 2014    VAGINA SURGERY  2003    Sling    VEIN SURGERY       Current Facility-Administered Medications   Medication Dose Route Frequency Provider Last Rate Last Dose    lactated ringers infusion   Intravenous Continuous Olivia Ovalles MD         Allergies   Allergen Reactions    Butalbital-Aspirin-Caffeine     Other      Fiorinal      Family History   Problem Relation Age of Onset    Diabetes Mother     Heart Disease Mother     High Blood Pressure Father  Arthritis Sister     Atrial Fibrillation Sister     Diabetes Brother     Heart Attack Brother         62s    Arthritis Sister      Social History     Socioeconomic History    Marital status:      Spouse name: Not on file    Number of children: Not on file    Years of education: Not on file    Highest education level: Not on file   Occupational History    Not on file   Social Needs    Financial resource strain: Not hard at all   Silvio-Ruth insecurity:     Worry: Never true     Inability: Never true   Moncai needs:     Medical: No     Non-medical: No   Tobacco Use    Smoking status: Never Smoker    Smokeless tobacco: Never Used   Substance and Sexual Activity    Alcohol use: Yes     Comment: occasionally    Drug use: No    Sexual activity: Not on file   Lifestyle    Physical activity:     Days per week: Not on file     Minutes per session: Not on file    Stress: Not on file   Relationships    Social connections:     Talks on phone: Not on file     Gets together: Not on file     Attends Mu-ism service: Not on file     Active member of club or organization: Not on file     Attends meetings of clubs or organizations: Not on file     Relationship status: Not on file    Intimate partner violence:     Fear of current or ex partner: Not on file     Emotionally abused: Not on file     Physically abused: Not on file     Forced sexual activity: Not on file   Other Topics Concern    Not on file   Social History Narrative    Not on file     ROS: Non-contributory    Physical Exam:  Vitals:    12/10/19 0815   BP: 133/70   Pulse: 56   Resp: 18   Temp: 97.2 °F (36.2 °C)   SpO2: 95%       Chest: Breath sounds were clear and equal with no rales, wheezes, or rhonchi. Respiratory effort was normal with no retractions or use of accessory muscles. Cardiovascular: Heart sounds were normal with a regular rate and rhythm. There were no murmurs, gallops or rubs. Abdomen:   Bowel sounds were normal.  The abdomen was soft and non distended. There was no tenderness, guarding, rebound, or rigidity. There were no masses, hepatosplenomegaly, or hernias.     Plan: EGD      Electronically by Tu Phipps MD  on 12/10/2019 at 8:47 AM

## 2019-12-10 NOTE — PROGRESS NOTES
Dr Kathryn Cortes spoke with pt and friend. Pt verbalized readiness to go home. Discharge instructions given to pt and friend. All questions answered at this time. Discharge Criteria    Inpatients must meet Criteria 1 through 7. All other patients are either YES or N/A. If a NO is chosen then Anesthesia or Surgeon must be notified. 1.  Minimum 30 minutes after last dose of sedative medication, minimum 120 minutes after last dose of reversal agent. Yes      2. Systolic BP stable within 20 mmHg for 30 minutes & systolic BP between 90 & 119 or within 10 mmHg of baseline. Yes      3. Pulse between 60 and 100 or within 10 bpm of baseline. Yes      4. Spontaneous respiratory rate >/= 10 per minute. Yes      5. SaO2 >/= 95 or  >/= baseline. Yes      6. Able to cough and swallow or return to baseline function. Yes      7. Alert and oriented or return to baseline mental status. Yes      8. Demonstrates controlled, coordinated movements, ambulates with steady gait, or return to baseline activity function. Yes      9. Minimal or no pain or nausea, or at a level tolerable and acceptable to patient. Yes      10. Takes and retains oral fluids as allowed. Yes      11. Procedural / perioperative site stable. Minimal or no bleeding. Yes          12. If GI endoscopy procedure, minimal or no abdominal distention or passing flatus. Yes      13. Written discharge instructions and emergency telephone number provided. Yes      14. Accompanied by a responsible adult.     Yes      Adult patient discharged from facility without responsible person meets above criteria plus the following:   a) remains awake without stimulus for 30 minutes     b) oriented appropriate for age      c) all vital signs stable   d) no significant risk of losing protective reflexes      e) able to maintain pre-procedure mobility without assistance   f) no nausea or dizziness      g) transportation arrangements that do

## 2019-12-12 LAB — SURGICAL PATHOLOGY REPORT: NORMAL

## 2019-12-13 ENCOUNTER — OFFICE VISIT (OUTPATIENT)
Dept: CARDIOLOGY | Age: 79
End: 2019-12-13
Payer: MEDICARE

## 2019-12-13 VITALS
SYSTOLIC BLOOD PRESSURE: 103 MMHG | BODY MASS INDEX: 25.64 KG/M2 | OXYGEN SATURATION: 97 % | HEIGHT: 64 IN | DIASTOLIC BLOOD PRESSURE: 67 MMHG | WEIGHT: 150.2 LBS | HEART RATE: 59 BPM | RESPIRATION RATE: 18 BRPM

## 2019-12-13 DIAGNOSIS — I48.0 PAF (PAROXYSMAL ATRIAL FIBRILLATION) (HCC): Primary | ICD-10-CM

## 2019-12-13 DIAGNOSIS — I34.0 MODERATE MITRAL REGURGITATION: ICD-10-CM

## 2019-12-13 DIAGNOSIS — R00.1 BRADYCARDIA: ICD-10-CM

## 2019-12-13 DIAGNOSIS — Z51.81 VISIT FOR MONITORING TIKOSYN THERAPY: ICD-10-CM

## 2019-12-13 DIAGNOSIS — Z79.01 ENCOUNTER FOR CURRENT LONG-TERM USE OF ANTICOAGULANTS: ICD-10-CM

## 2019-12-13 DIAGNOSIS — Z79.899 VISIT FOR MONITORING TIKOSYN THERAPY: ICD-10-CM

## 2019-12-13 PROCEDURE — 99214 OFFICE O/P EST MOD 30 MIN: CPT | Performed by: FAMILY MEDICINE

## 2019-12-13 PROCEDURE — 93000 ELECTROCARDIOGRAM COMPLETE: CPT | Performed by: FAMILY MEDICINE

## 2020-02-25 RX ORDER — DOFETILIDE 0.25 MG/1
250 CAPSULE ORAL 2 TIMES DAILY
Qty: 180 CAPSULE | Refills: 3 | Status: SHIPPED | OUTPATIENT
Start: 2020-02-25 | End: 2020-10-15

## 2020-06-15 ENCOUNTER — OFFICE VISIT (OUTPATIENT)
Dept: CARDIOLOGY | Age: 80
End: 2020-06-15
Payer: MEDICARE

## 2020-06-15 VITALS
OXYGEN SATURATION: 98 % | DIASTOLIC BLOOD PRESSURE: 72 MMHG | BODY MASS INDEX: 25.27 KG/M2 | HEART RATE: 60 BPM | HEIGHT: 64 IN | SYSTOLIC BLOOD PRESSURE: 116 MMHG | WEIGHT: 148 LBS | RESPIRATION RATE: 18 BRPM

## 2020-06-15 PROCEDURE — 99214 OFFICE O/P EST MOD 30 MIN: CPT | Performed by: FAMILY MEDICINE

## 2020-06-15 PROCEDURE — 93000 ELECTROCARDIOGRAM COMPLETE: CPT | Performed by: FAMILY MEDICINE

## 2020-06-15 NOTE — PROGRESS NOTES
[address] [facility] [place]    VAGINA SURGERY  2003    Sling    VEIN SURGERY      Social History:  Social History     Tobacco Use    Smoking status: Never Smoker    Smokeless tobacco: Never Used   Substance Use Topics    Alcohol use: Yes     Comment: occasionally    Drug use: No        CURRENT MEDICATIONS:  Outpatient Medications Marked as Taking for the 6/15/20 encounter (Office Visit) with Estefania Ny MD   Medication Sig Dispense Refill    dofetilide (TIKOSYN) 250 MCG capsule Take 1 capsule by mouth 2 times daily 180 capsule 3    apixaban (ELIQUIS) 5 MG TABS tablet Take 1 tablet by mouth 2 times daily 180 tablet 3    omeprazole (PRILOSEC) 40 MG delayed release capsule Take 1 capsule by mouth daily 90 capsule 3    aspirin EC 81 MG EC tablet Take 1 tablet by mouth daily 90 tablet 3    vitamin D (CHOLECALCIFEROL) 1000 UNIT TABS tablet Take 1,000 Units by mouth nightly       docusate sodium (COLACE) 100 MG capsule Take 200 mg by mouth nightly       Probiotic Product (PROBIOTIC & ACIDOPHILUS EX ST PO) Take 1 capsule by mouth daily       Lysine 500 MG TABS Take 1 tablet by mouth nightly       Magnesium 400 MG TABS Take 1 tablet by mouth nightly          FAMILY HISTORY: family history includes Arthritis in her sister and sister; Atrial Fibrillation in her sister; Diabetes in her brother and mother; Heart Attack in her brother; Heart Disease in her mother; High Blood Pressure in her father. PHYSICAL EXAM:   /72 (Site: Right Upper Arm, Position: Sitting, Cuff Size: Medium Adult)   Pulse 60   Resp 18   Ht 5' 4.02\" (1.626 m)   Wt 148 lb (67.1 kg)   SpO2 98%   BMI 25.39 kg/m²  Body mass index is 25.39 kg/m². Constitutional: She is oriented to person, place, and time. She appears well-developed and well-nourished. In no acute distress. HEENT: Normocephalic and atraumatic. No JVD present. Carotid bruit is not present. No mass and no thyromegaly present. No lymphadenopathy present.   Cardiovascular: Bradycardic rate, regular rhythm, normal heart sounds. Exam reveals no gallop and no friction rubs. 2/6 systolic murmur, 5th intercostal space on the LEFT in the mid-clavicular line (cardiac apex). Pulmonary/Chest: Effort normal and breath sounds normal. No respiratory distress. She has no wheezes, rhonchi or rales. Abdominal: Soft, non-tender. Bowel sounds and aorta are normal. She exhibits no organomegaly, mass or bruit. Extremities:  No edema. No cyanosis and no clubbing. Pulses are 2+ radial and carotid pulses. 2+ dorsalis pedis and posterior tibial pulses bilaterally. Neurological: She is alert and oriented to person. No evidence of gross cranial nerve deficit. Coordination appeared normal.   Skin: Skin is warm and dry. There is no rash or diaphoresis. Psychiatric: She has a normal mood and affect. Her speech is normal and behavior is normal.      MOST RECENT LABS ON RECORD:   Lab Results   Component Value Date    WBC 98 (H) 04/09/2020    HGB 12.6 12/07/2018    HCT 39.8 12/07/2018     12/07/2018    CHOL 180 05/11/2019    TRIG 39 05/11/2019    HDL 67 05/11/2019    ALT 20 12/07/2018    AST 26 12/07/2018     05/11/2019    K 4.4 05/11/2019     05/11/2019    CREATININE 0.74 05/11/2019    BUN 15.0 05/11/2019    CO2 28.0 05/11/2019    TSH 2.46 04/24/2018    LABA1C 5.7 12/07/2018       ASSESSMENT:  1. Paroxysmal atrial fibrillation (HCC)    2. Visit for monitoring Tikosyn therapy    3. Moderate mitral regurgitation    4. Bradycardia       PLAN:  Paroxysmal Atrial Fibrillation: Rhythm Control.  Probably well controlled but patient does report mild intermittent  lightheadedness and dizziness which she asked whether this could be related to her Tikosyn and/or atrial fibrillation   Beta Blocker: Not able to take due to bradycardia       Rhythm Control Agent: Continue Dofetilide (Tikosyn) 250 mcg twice daily    Monitoring: Dofetilide (Tikosyn Since she will be maintained on Tikosyn I will continue to monitor should the need arise. Sincerely,  Carmen Roland. Renato MARTIN, MS, F.A.C.C. [facility] Cardiology Specialist    [address] [place] [address] [facility]  Phone: [phone], Fax: [phone]     I believe that the risk of significant morbidity and mortality related to the patient's current medical conditions are: Intermediate. The documentation recorded by the scribe, accurately and completely reflects the services I personally performed and the decisions made by me. Adam Galvez MD, MS, F.A.C.C.  Danielle 15, 2020

## 2020-06-19 ENCOUNTER — HOSPITAL ENCOUNTER (OUTPATIENT)
Dept: NON INVASIVE DIAGNOSTICS | Age: 80
Discharge: HOME | End: 2020-06-19
Payer: MEDICARE

## 2020-06-19 LAB
LV EF: 60 %
LVEF MODALITY: NORMAL

## 2020-06-19 PROCEDURE — 93306 TTE W/DOPPLER COMPLETE: CPT

## 2020-06-21 NOTE — RESULT ENCOUNTER NOTE
Please let Ms. Springer know that her test was abnormal and please make them an appointment in 1-2 weeks if not already done. Thanks.     Renato

## 2020-06-22 ENCOUNTER — TELEPHONE (OUTPATIENT)
Dept: CARDIOLOGY | Age: 80
End: 2020-06-22

## 2020-06-23 ENCOUNTER — OFFICE VISIT (OUTPATIENT)
Dept: CARDIOLOGY | Age: 80
End: 2020-06-23
Payer: MEDICARE

## 2020-06-23 VITALS
WEIGHT: 150 LBS | RESPIRATION RATE: 18 BRPM | BODY MASS INDEX: 25.61 KG/M2 | OXYGEN SATURATION: 99 % | HEART RATE: 60 BPM | HEIGHT: 64 IN | DIASTOLIC BLOOD PRESSURE: 75 MMHG | SYSTOLIC BLOOD PRESSURE: 114 MMHG

## 2020-06-23 PROBLEM — I34.0 SEVERE MITRAL REGURGITATION BY PRIOR ECHOCARDIOGRAM: Status: ACTIVE | Noted: 2020-06-23

## 2020-06-23 PROBLEM — R06.02 SHORTNESS OF BREATH ON EXERTION: Status: ACTIVE | Noted: 2020-06-23

## 2020-06-23 PROCEDURE — 99215 OFFICE O/P EST HI 40 MIN: CPT | Performed by: FAMILY MEDICINE

## 2020-06-23 NOTE — PATIENT INSTRUCTIONS
SURVEY:    You may be receiving a survey from [employer] regarding your visit today. Please complete the survey to enable us to provide the highest quality of care to you and your family. If you cannot score us a very good on any question, please call the office to discuss how we could have made your experience a very good one. Thank you.

## 2020-06-23 NOTE — PROGRESS NOTES
Bulmaro Valdez am scribing for and in the presence of Heather Mosquera. Renato MARTIN, MS, F.A.C.C. Patient: Donte Chacon  : 1940  Date of Visit: 2020    REASON FOR VISIT / CONSULTATION: Follow-up (Hx: PAF, mod mitral regurg, john. Pt is here to review recent testing. C/o: Palpitaiotns and SOB on occahion. Denies: CP, Lihgteaded/dizziness,)      Dear Lucy Peña MD,    I had the pleasure of seeing Donte Chacon today. Ms. Emerita Walter is a 78 y.o. female with a history of paroxysmal atrial fibrillation. She reports that this 1st occurred a few year ago and most recently occurred first part of February  but she says she has always had some degree of palpitations. Her last previous sustained event was about a year ago. She also has a history of mitral valve prolapse but denies any heart attacks, other cardiac issues or stroke or mini stroke. She underwent a cardiovascular stress test on 2018 that had show to be consistent with low to intermediate risk of coronary artery disease. Her cardiac catheterization on 2018 had shown mild irregularities with the worst being in her LAD at 20-30%. She was also admitted to the hospital for tikosyn loading on 2018 and has been maintained on this ever since with good results. She had an Echo done on 2020 that showed the left atrium that is severely dilated (>40) with a left atrial volume index of 76 ml/m2. Bileaflet prolapse with more prominent P2 prolapse and moderate to severe mitral regurgitation. Moderate tricuspid regurgitation. Mild pulmonary hypertension with estimated pulmonary artery systolic pressure of 33 mmHg. Moderate diastolic dysfunction. Her EF was 60%. Since the last time I saw Ms. Springer, she continues to do well. She does get occasional heart palpations. She reports her shortness of breath has stayed the same since last visit which has gotten a bit worse over the last 6 months but not bad.  She had an echo done on 6/19/2020 that she is here to go over. She denied any chest pain or discomfort, abdominal pain, bleeding problems, problems with her medications or any other concerns at this time. She denies any known repeat episodes of atrial fibrillation. Exercise Tolerance: She reports having a a good exercise tolerance. Ms. Rodolfo Medina says that she  Thinks that she could probably walk a mile without developing chest discomfort and/or significant shortness of breath but with more shortness of breath than 6 months ago. She also denies any lightheadedness or dizziness. Past Medical History:   Diagnosis Date    Essential hypertension     Fracture of sacrum ([facility] [address] 2011    GERD (gastroesophageal reflux disease)     H/O cardiovascular stress test 02/28/2018    Equivocal myocardial perfusion study. There is a small/moderate perufsion defect of mild intensity in the apical an anteroapical regions during stress imaging which is most consistent with artifact but may be due to a small degree of coronary ischemia. Overall, these results are most consistent with a low/intermediate risk for CAD.     H/O echocardiogram 02/02/2018    EF 60%. Mildly increased left ventricular wall thickness. No definite specific wall motion abnormalities. Bi-atrial enlargment. Myxomatous thickening of the mitral leaflets with bileaflet prolapse and moderat mitral regurg. Moderate tricuspid regurg. Mild pulmonary HTN with an estimated right ventricualr systolic pressure 57LBKN.  History of Holter monitoring 2016    PACs & PVCs    Irritable bowel syndrome     Mitral valve prolapse     Osteoarthritis     Osteopenia     Paroxysmal atrial fibrillation (HCC) 2018    Rosacea     Tendinitis of left shoulder        CURRENT ALLERGIES: Butalbital-aspirin-caffeine and Other REVIEW OF SYSTEMS: 14 systems were reviewed. Pertinent positives and negatives as above, all else negative.      Past Surgical History:   Procedure Laterality Date    BUNIONECTOMY      COLONOSCOPY  2008    CYSTOCELE REPAIR      HERNIA REPAIR      MOHS SURGERY  2008    LUBA AND BSO Bilateral 2014    UPPER GASTROINTESTINAL ENDOSCOPY N/A 12/10/2019    -antral erythema,bx(mild chronic inactive gastritis with focal intestinal metaplasia,neg H-Pylori)dilation    UPPER GASTROINTESTINAL ENDOSCOPY  12/10/2019    EGD DILATION SAVORY performed by Cesar Barroso MD at [place] [address]  2003    Sling    VEIN SURGERY      Social History:  Social History     Tobacco Use    Smoking status: Never Smoker    Smokeless tobacco: Never Used   Substance Use Topics    Alcohol use: Yes     Comment: occasionally    Drug use: No        CURRENT MEDICATIONS:  Outpatient Medications Marked as Taking for the 6/23/20 encounter (Office Visit) with Geni Sesay MD   Medication Sig Dispense Refill    dofetilide (TIKOSYN) 250 MCG capsule Take 1 capsule by mouth 2 times daily 180 capsule 3    apixaban (ELIQUIS) 5 MG TABS tablet Take 1 tablet by mouth 2 times daily 180 tablet 3    omeprazole (PRILOSEC) 40 MG delayed release capsule Take 1 capsule by mouth daily 90 capsule 3    aspirin EC 81 MG EC tablet Take 1 tablet by mouth daily 90 tablet 3    vitamin D (CHOLECALCIFEROL) 1000 UNIT TABS tablet Take 1,000 Units by mouth nightly       docusate sodium (COLACE) 100 MG capsule Take 200 mg by mouth nightly       Probiotic Product (PROBIOTIC & ACIDOPHILUS EX ST PO) Take 1 capsule by mouth daily       Lysine 500 MG TABS Take 1 tablet by mouth nightly       Magnesium 400 MG TABS Take 1 tablet by mouth nightly          FAMILY HISTORY: family history includes Arthritis in her sister and sister; Atrial Fibrillation in her sister; Diabetes in her brother and mother; Heart Attack in her brother; Heart Disease in her mother; High Blood Pressure in her father.      PHYSICAL EXAM:   /75 (Site: Left Upper Arm, Position: Sitting, Cuff Size: Medium Adult)   Pulse 60   Resp 18   Ht 5' 4\" (1.626 m)   Wt EDT  Click here for a link to the UpToDate guideline \"Atrial Fibrillation: Anticoagulation therapy to prevent embolization  Disclaimer: Risk Score calculation is dependent on accuracy of patient problem list and past encounter diagnosis. Anticoagulation: Continue Apixaban (Eliquis) 5 mg every 12 hours. I also reminded her to watch for signs of blood in her stool or black tarry stools and stop the medication immediately if this develops as this could be life threatening. Finally, I recommended that she continue her other medications and follow up with you as previously scheduled. FOLLOW UP:   I told Ms. Springer to call my office if she had any problems, but otherwise I asked her to Return in about 4 weeks (around 7/21/2020). However, I would be happy to see her sooner should the need arise. Sincerely,  Burlene Seip. Bruhl MD, MS, F.A.C.C. [facility] Cardiology Specialist    [address]  Phone: [phone], Fax: [phone]     I believe that the risk of significant morbidity and mortality related to the patient's current medical conditions are: high. The documentation recorded by the scribe, accurately and completely reflects the services I personally performed and the decisions made by me. Елена Sanchez MD, MS, F.A.C.C.  June 23, 2020

## 2020-06-26 PROBLEM — R94.39 ABNORMAL CARDIOVASCULAR STRESS TEST: Status: RESOLVED | Noted: 2018-04-24 | Resolved: 2020-06-26

## 2020-06-29 ENCOUNTER — TELEPHONE (OUTPATIENT)
Dept: CARDIOLOGY | Age: 80
End: 2020-06-29

## 2020-06-29 ENCOUNTER — HOSPITAL ENCOUNTER (OUTPATIENT)
Dept: PREADMISSION TESTING | Age: 80
Setting detail: SPECIMEN
Discharge: HOME | End: 2020-07-03
Payer: MEDICARE

## 2020-06-29 ENCOUNTER — HOSPITAL ENCOUNTER (OUTPATIENT)
Age: 80
Discharge: HOME | End: 2020-06-29
Payer: MEDICARE

## 2020-06-29 DIAGNOSIS — Z01.818 PREOP TESTING: Primary | ICD-10-CM

## 2020-06-29 LAB
ANION GAP SERPL CALCULATED.3IONS-SCNC: 11 MMOL/L (ref 9–17)
BUN BLDV-MCNC: 15 MG/DL (ref 8–23)
BUN/CREAT BLD: 24 (ref 9–20)
CALCIUM SERPL-MCNC: 9.2 MG/DL (ref 8.6–10.4)
CHLORIDE BLD-SCNC: 101 MMOL/L (ref 98–107)
CO2: 28 MMOL/L (ref 20–31)
CREAT SERPL-MCNC: 0.63 MG/DL (ref 0.5–0.9)
GFR AFRICAN AMERICAN: >60 ML/MIN
GFR NON-AFRICAN AMERICAN: >60 ML/MIN
GFR SERPL CREATININE-BSD FRML MDRD: ABNORMAL ML/MIN/{1.73_M2}
GFR SERPL CREATININE-BSD FRML MDRD: ABNORMAL ML/MIN/{1.73_M2}
GLUCOSE BLD-MCNC: 99 MG/DL (ref 70–99)
HCT VFR BLD CALC: 39.6 % (ref 36.3–47.1)
HEMOGLOBIN: 12.7 G/DL (ref 11.9–15.1)
MCH RBC QN AUTO: 33.8 PG (ref 25.2–33.5)
MCHC RBC AUTO-ENTMCNC: 32.1 G/DL (ref 28.4–34.8)
MCV RBC AUTO: 105.3 FL (ref 82.6–102.9)
NRBC AUTOMATED: 0 PER 100 WBC
PDW BLD-RTO: 12.5 % (ref 11.8–14.4)
PLATELET # BLD: 166 K/UL (ref 138–453)
PMV BLD AUTO: 9.6 FL (ref 8.1–13.5)
POTASSIUM SERPL-SCNC: 4.2 MMOL/L (ref 3.7–5.3)
RBC # BLD: 3.76 M/UL (ref 3.95–5.11)
SODIUM BLD-SCNC: 140 MMOL/L (ref 135–144)
WBC # BLD: 4.3 K/UL (ref 3.5–11.3)

## 2020-06-29 PROCEDURE — U0003 INFECTIOUS AGENT DETECTION BY NUCLEIC ACID (DNA OR RNA); SEVERE ACUTE RESPIRATORY SYNDROME CORONAVIRUS 2 (SARS-COV-2) (CORONAVIRUS DISEASE [COVID-19]), AMPLIFIED PROBE TECHNIQUE, MAKING USE OF HIGH THROUGHPUT TECHNOLOGIES AS DESCRIBED BY CMS-2020-01-R: HCPCS

## 2020-06-29 PROCEDURE — 36415 COLL VENOUS BLD VENIPUNCTURE: CPT

## 2020-06-29 PROCEDURE — 85027 COMPLETE CBC AUTOMATED: CPT

## 2020-06-29 PROCEDURE — 80048 BASIC METABOLIC PNL TOTAL CA: CPT

## 2020-06-29 NOTE — RESULT ENCOUNTER NOTE
Please let Ms. Springer know that her urinalysis did look like she has a UTI. Would suggest that she contact Dr. Snehal HUERTA. Thanks.   Renato

## 2020-07-01 LAB — SARS-COV-2, NAA: NOT DETECTED

## 2020-07-01 RX ORDER — NITROGLYCERIN 0.4 MG/1
0.4 TABLET SUBLINGUAL EVERY 5 MIN PRN
Status: CANCELLED | OUTPATIENT
Start: 2020-07-01

## 2020-07-01 RX ORDER — SODIUM CHLORIDE 0.9 % (FLUSH) 0.9 %
10 SYRINGE (ML) INJECTION EVERY 12 HOURS SCHEDULED
Status: CANCELLED | OUTPATIENT
Start: 2020-07-01

## 2020-07-01 RX ORDER — SODIUM CHLORIDE 9 MG/ML
INJECTION, SOLUTION INTRAVENOUS CONTINUOUS
Status: CANCELLED | OUTPATIENT
Start: 2020-07-01

## 2020-07-01 RX ORDER — SODIUM CHLORIDE 0.9 % (FLUSH) 0.9 %
10 SYRINGE (ML) INJECTION PRN
Status: CANCELLED | OUTPATIENT
Start: 2020-07-01

## 2020-07-01 RX ORDER — DIPHENHYDRAMINE HCL 25 MG
50 TABLET ORAL ONCE
Status: CANCELLED | OUTPATIENT
Start: 2020-07-01 | End: 2020-07-01

## 2020-07-01 RX ORDER — ACETAMINOPHEN 325 MG/1
650 TABLET ORAL EVERY 4 HOURS PRN
Status: CANCELLED | OUTPATIENT
Start: 2020-07-01

## 2020-07-02 ENCOUNTER — TELEPHONE (OUTPATIENT)
Dept: CARDIOLOGY | Age: 80
End: 2020-07-02

## 2020-07-02 ENCOUNTER — HOSPITAL ENCOUNTER (OUTPATIENT)
Dept: NON INVASIVE DIAGNOSTICS | Age: 80
Discharge: HOME | End: 2020-07-02
Payer: MEDICARE

## 2020-07-02 ENCOUNTER — HOSPITAL ENCOUNTER (OUTPATIENT)
Dept: CARDIAC CATH/INVASIVE PROCEDURES | Age: 80
Discharge: HOME | End: 2020-07-02
Attending: FAMILY MEDICINE | Admitting: FAMILY MEDICINE
Payer: MEDICARE

## 2020-07-02 VITALS
BODY MASS INDEX: 25.61 KG/M2 | SYSTOLIC BLOOD PRESSURE: 119 MMHG | WEIGHT: 150 LBS | OXYGEN SATURATION: 96 % | TEMPERATURE: 97.1 F | HEIGHT: 64 IN | RESPIRATION RATE: 18 BRPM | DIASTOLIC BLOOD PRESSURE: 67 MMHG | HEART RATE: 54 BPM

## 2020-07-02 VITALS
RESPIRATION RATE: 12 BRPM | SYSTOLIC BLOOD PRESSURE: 104 MMHG | OXYGEN SATURATION: 98 % | HEART RATE: 55 BPM | DIASTOLIC BLOOD PRESSURE: 52 MMHG

## 2020-07-02 PROBLEM — Z01.818 PREOPERATIVE EVALUATION TO RULE OUT SURGICAL CONTRAINDICATION: Status: ACTIVE | Noted: 2020-07-02

## 2020-07-02 PROCEDURE — C1769 GUIDE WIRE: HCPCS

## 2020-07-02 PROCEDURE — 2580000003 HC RX 258: Performed by: FAMILY MEDICINE

## 2020-07-02 PROCEDURE — C1894 INTRO/SHEATH, NON-LASER: HCPCS

## 2020-07-02 PROCEDURE — 6360000004 HC RX CONTRAST MEDICATION: Performed by: FAMILY MEDICINE

## 2020-07-02 PROCEDURE — 99152 MOD SED SAME PHYS/QHP 5/>YRS: CPT | Performed by: FAMILY MEDICINE

## 2020-07-02 PROCEDURE — C1725 CATH, TRANSLUMIN NON-LASER: HCPCS

## 2020-07-02 PROCEDURE — 6370000000 HC RX 637 (ALT 250 FOR IP): Performed by: FAMILY MEDICINE

## 2020-07-02 PROCEDURE — 6360000002 HC RX W HCPCS

## 2020-07-02 PROCEDURE — 2500000003 HC RX 250 WO HCPCS

## 2020-07-02 PROCEDURE — 93312 ECHO TRANSESOPHAGEAL: CPT

## 2020-07-02 PROCEDURE — C1887 CATHETER, GUIDING: HCPCS

## 2020-07-02 PROCEDURE — 2709999900 HC NON-CHARGEABLE SUPPLY

## 2020-07-02 PROCEDURE — 93458 L HRT ARTERY/VENTRICLE ANGIO: CPT | Performed by: FAMILY MEDICINE

## 2020-07-02 PROCEDURE — 6360000002 HC RX W HCPCS: Performed by: FAMILY MEDICINE

## 2020-07-02 RX ORDER — SODIUM CHLORIDE 0.9 % (FLUSH) 0.9 %
10 SYRINGE (ML) INJECTION EVERY 12 HOURS SCHEDULED
Status: DISCONTINUED | OUTPATIENT
Start: 2020-07-02 | End: 2020-07-02 | Stop reason: HOSPADM

## 2020-07-02 RX ORDER — PYRIDOXINE HCL (VITAMIN B6) 100 MG
500 TABLET ORAL DAILY
COMMUNITY
End: 2020-10-15 | Stop reason: ALTCHOICE

## 2020-07-02 RX ORDER — SODIUM CHLORIDE 9 MG/ML
INJECTION, SOLUTION INTRAVENOUS CONTINUOUS
Status: DISCONTINUED | OUTPATIENT
Start: 2020-07-02 | End: 2020-07-02 | Stop reason: HOSPADM

## 2020-07-02 RX ORDER — MIDAZOLAM HYDROCHLORIDE 1 MG/ML
INJECTION INTRAMUSCULAR; INTRAVENOUS
Status: COMPLETED | OUTPATIENT
Start: 2020-07-02 | End: 2020-07-02

## 2020-07-02 RX ORDER — FENTANYL CITRATE 50 UG/ML
INJECTION, SOLUTION INTRAMUSCULAR; INTRAVENOUS
Status: COMPLETED | OUTPATIENT
Start: 2020-07-02 | End: 2020-07-02

## 2020-07-02 RX ORDER — DIPHENHYDRAMINE HCL 25 MG
50 CAPSULE ORAL ONCE
Status: DISCONTINUED | OUTPATIENT
Start: 2020-07-02 | End: 2020-07-02 | Stop reason: HOSPADM

## 2020-07-02 RX ORDER — NITROGLYCERIN 0.4 MG/1
0.4 TABLET SUBLINGUAL EVERY 5 MIN PRN
Status: DISCONTINUED | OUTPATIENT
Start: 2020-07-02 | End: 2020-07-02 | Stop reason: HOSPADM

## 2020-07-02 RX ORDER — ACETAMINOPHEN 325 MG/1
650 TABLET ORAL EVERY 4 HOURS PRN
Status: DISCONTINUED | OUTPATIENT
Start: 2020-07-02 | End: 2020-07-02 | Stop reason: HOSPADM

## 2020-07-02 RX ORDER — SODIUM CHLORIDE 0.9 % (FLUSH) 0.9 %
10 SYRINGE (ML) INJECTION PRN
Status: DISCONTINUED | OUTPATIENT
Start: 2020-07-02 | End: 2020-07-02 | Stop reason: HOSPADM

## 2020-07-02 RX ORDER — CALCIUM POLYCARBOPHIL 625 MG 625 MG/1
625 TABLET ORAL DAILY
COMMUNITY
End: 2020-10-15 | Stop reason: ALTCHOICE

## 2020-07-02 RX ADMIN — MIDAZOLAM 1 MG: 1 INJECTION INTRAMUSCULAR; INTRAVENOUS at 09:15

## 2020-07-02 RX ADMIN — Medication 25 MCG: at 09:12

## 2020-07-02 RX ADMIN — SODIUM CHLORIDE: 9 INJECTION, SOLUTION INTRAVENOUS at 09:03

## 2020-07-02 RX ADMIN — IOPAMIDOL 60 ML: 755 INJECTION, SOLUTION INTRAVENOUS at 10:23

## 2020-07-02 RX ADMIN — BENZOCAINE 1 EACH: 200 SPRAY DENTAL; ORAL; PERIODONTAL at 09:12

## 2020-07-02 RX ADMIN — MIDAZOLAM 1 MG: 1 INJECTION INTRAMUSCULAR; INTRAVENOUS at 09:12

## 2020-07-02 NOTE — PROGRESS NOTES
Pressure dressing loosened, patient began bleeding immediately. Manual pressure applied. Pressure dressing changed. New pressure dressing applied. Will continue to monitor.

## 2020-07-06 ENCOUNTER — HOSPITAL ENCOUNTER (OUTPATIENT)
Dept: GENERAL RADIOLOGY | Age: 80
Discharge: HOME | End: 2020-07-08
Payer: MEDICARE

## 2020-07-06 PROCEDURE — 74220 X-RAY XM ESOPHAGUS 1CNTRST: CPT

## 2020-07-07 ENCOUNTER — TELEPHONE (OUTPATIENT)
Dept: CARDIOLOGY | Age: 80
End: 2020-07-07

## 2020-07-07 ENCOUNTER — HOSPITAL ENCOUNTER (OUTPATIENT)
Dept: LAB | Age: 80
Discharge: HOME | End: 2020-07-07
Payer: MEDICARE

## 2020-07-07 PROCEDURE — U0004 COV-19 TEST NON-CDC HGH THRU: HCPCS

## 2020-07-08 LAB
SARS-COV-2, PCR: NOT DETECTED
SARS-COV-2, RAPID: NORMAL
SARS-COV-2: NORMAL
SOURCE: NORMAL

## 2020-07-09 ENCOUNTER — TELEPHONE (OUTPATIENT)
Dept: PRIMARY CARE CLINIC | Age: 80
End: 2020-07-09

## 2020-07-09 ENCOUNTER — ANESTHESIA EVENT (OUTPATIENT)
Dept: NON INVASIVE DIAGNOSTICS | Age: 80
End: 2020-07-09

## 2020-07-09 ENCOUNTER — ANESTHESIA (OUTPATIENT)
Dept: NON INVASIVE DIAGNOSTICS | Age: 80
End: 2020-07-09

## 2020-07-09 ENCOUNTER — HOSPITAL ENCOUNTER (OUTPATIENT)
Dept: NON INVASIVE DIAGNOSTICS | Age: 80
Discharge: HOME | End: 2020-07-09
Payer: MEDICARE

## 2020-07-09 VITALS
RESPIRATION RATE: 16 BRPM | WEIGHT: 150 LBS | HEART RATE: 58 BPM | DIASTOLIC BLOOD PRESSURE: 63 MMHG | HEIGHT: 64 IN | OXYGEN SATURATION: 96 % | SYSTOLIC BLOOD PRESSURE: 131 MMHG | TEMPERATURE: 97 F | BODY MASS INDEX: 25.61 KG/M2

## 2020-07-09 VITALS — OXYGEN SATURATION: 96 % | DIASTOLIC BLOOD PRESSURE: 60 MMHG | SYSTOLIC BLOOD PRESSURE: 105 MMHG

## 2020-07-09 PROCEDURE — 2580000003 HC RX 258: Performed by: NURSE ANESTHETIST, CERTIFIED REGISTERED

## 2020-07-09 PROCEDURE — 2500000003 HC RX 250 WO HCPCS: Performed by: NURSE ANESTHETIST, CERTIFIED REGISTERED

## 2020-07-09 PROCEDURE — 6360000002 HC RX W HCPCS: Performed by: NURSE ANESTHETIST, CERTIFIED REGISTERED

## 2020-07-09 PROCEDURE — 3700000000 HC ANESTHESIA ATTENDED CARE

## 2020-07-09 PROCEDURE — 3700000001 HC ADD 15 MINUTES (ANESTHESIA)

## 2020-07-09 RX ORDER — PROPOFOL 10 MG/ML
INJECTION, EMULSION INTRAVENOUS PRN
Status: DISCONTINUED | OUTPATIENT
Start: 2020-07-09 | End: 2020-07-09 | Stop reason: SDUPTHER

## 2020-07-09 RX ORDER — SODIUM CHLORIDE 0.9 % (FLUSH) 0.9 %
10 SYRINGE (ML) INJECTION EVERY 12 HOURS SCHEDULED
Status: DISCONTINUED | OUTPATIENT
Start: 2020-07-09 | End: 2020-07-10 | Stop reason: HOSPADM

## 2020-07-09 RX ORDER — SODIUM CHLORIDE 9 MG/ML
INJECTION, SOLUTION INTRAVENOUS CONTINUOUS
Status: DISCONTINUED | OUTPATIENT
Start: 2020-07-09 | End: 2020-07-10 | Stop reason: HOSPADM

## 2020-07-09 RX ORDER — SODIUM CHLORIDE 0.9 % (FLUSH) 0.9 %
10 SYRINGE (ML) INJECTION PRN
Status: DISCONTINUED | OUTPATIENT
Start: 2020-07-09 | End: 2020-07-10 | Stop reason: HOSPADM

## 2020-07-09 RX ORDER — LIDOCAINE HYDROCHLORIDE 20 MG/ML
INJECTION, SOLUTION EPIDURAL; INFILTRATION; INTRACAUDAL; PERINEURAL PRN
Status: DISCONTINUED | OUTPATIENT
Start: 2020-07-09 | End: 2020-07-09 | Stop reason: SDUPTHER

## 2020-07-09 RX ORDER — SODIUM CHLORIDE, SODIUM LACTATE, POTASSIUM CHLORIDE, CALCIUM CHLORIDE 600; 310; 30; 20 MG/100ML; MG/100ML; MG/100ML; MG/100ML
INJECTION, SOLUTION INTRAVENOUS CONTINUOUS PRN
Status: DISCONTINUED | OUTPATIENT
Start: 2020-07-09 | End: 2020-07-09 | Stop reason: SDUPTHER

## 2020-07-09 RX ADMIN — PROPOFOL 50 MG: 10 INJECTION, EMULSION INTRAVENOUS at 14:52

## 2020-07-09 RX ADMIN — PROPOFOL 20 MG: 10 INJECTION, EMULSION INTRAVENOUS at 14:56

## 2020-07-09 RX ADMIN — PROPOFOL 20 MG: 10 INJECTION, EMULSION INTRAVENOUS at 15:05

## 2020-07-09 RX ADMIN — LIDOCAINE HYDROCHLORIDE 100 MG: 20 INJECTION, SOLUTION EPIDURAL; INFILTRATION; INTRACAUDAL; PERINEURAL at 14:52

## 2020-07-09 RX ADMIN — PROPOFOL 30 MG: 10 INJECTION, EMULSION INTRAVENOUS at 15:14

## 2020-07-09 RX ADMIN — SODIUM CHLORIDE, POTASSIUM CHLORIDE, SODIUM LACTATE AND CALCIUM CHLORIDE: 600; 310; 30; 20 INJECTION, SOLUTION INTRAVENOUS at 14:38

## 2020-07-09 RX ADMIN — PROPOFOL 20 MG: 10 INJECTION, EMULSION INTRAVENOUS at 15:02

## 2020-07-09 RX ADMIN — PROPOFOL 30 MG: 10 INJECTION, EMULSION INTRAVENOUS at 15:11

## 2020-07-09 RX ADMIN — PROPOFOL 20 MG: 10 INJECTION, EMULSION INTRAVENOUS at 14:59

## 2020-07-09 RX ADMIN — PROPOFOL 40 MG: 10 INJECTION, EMULSION INTRAVENOUS at 15:08

## 2020-07-09 RX ADMIN — PROPOFOL 40 MG: 10 INJECTION, EMULSION INTRAVENOUS at 14:54

## 2020-07-09 ASSESSMENT — ENCOUNTER SYMPTOMS
DYSPNEA ACTIVITY LEVEL: AFTER AMBULATING 1 FLIGHT OF STAIRS
SHORTNESS OF BREATH: 1

## 2020-07-09 NOTE — OP NOTE
Transesophageal Echocardiogram Post-op Note    Patient: Ms. Springer      Procedure(s) Performed: transesophageal echocardiogram (BELEN)    Pre-op Diagnosis: Severe mitral regurgitation   Persistent atrial flutter    Anesthesia type: General anesthesia with anesthesia     Patient location: Cardiac catheterization laboratory     Post-op pain: Adequate analgesia    Post-op assessment: no apparent anesthetic complications    Post-op Diagnosis: Unable to pass probe. Multiple attempts performed by Dr. Henrietta Johnson with anesthesia as well as Dr. Carrie Jimenez were unsuccessful at passing the probe more than 2-3 inches beyond the oropharynx and so the procedure was abandoned. Will refer to CT surgery for consideration of corrective mitral valve surgery based on the results of the patients TTE. Last Vitals:   Vitals:    07/09/20 1615   BP: 115/72   Pulse: 63   Resp: 18   Temp:    SpO2: 91%       Post-op vital signs: stable    Level of consciousness: awake    Complications: none    Gokul Gross MD, MS, F.A.C.C.   [facility]   [facility]  [facility] [address] [place] [address]  Phone: [phone], Fax: [phone]

## 2020-07-09 NOTE — ANESTHESIA PRE PROCEDURE
Department of Anesthesiology  Preprocedure Note       Name:  Lorie Brewer   Age:  78 y.o.  :  1940                                          MRN:  [id number]         Date:  2020      Surgeon: * No surgeons listed *    Procedure: * No procedures listed *    Medications prior to admission:   Prior to Admission medications    Medication Sig Start Date End Date Taking?  Authorizing Provider   omeprazole (PRILOSEC) 40 MG delayed release capsule Take 1 capsule by mouth daily 20  Yes Sarah Garcia MD   polycarbophil (FIBERCON) 625 MG tablet Take 625 mg by mouth daily   Yes Historical Provider, MD   Cranberry 500 MG CAPS Take 500 mg by mouth daily   Yes Historical Provider, MD   dofetilide (TIKOSYN) 250 MCG capsule Take 1 capsule by mouth 2 times daily 20  Yes Narinder Lakhani MD   apixaban (ELIQUIS) 5 MG TABS tablet Take 1 tablet by mouth 2 times daily 20  Yes Narinder Lakhani MD   aspirin EC 81 MG EC tablet Take 1 tablet by mouth daily 3/14/18  Yes Narinder Lakhani MD   vitamin D (CHOLECALCIFEROL) 1000 UNIT TABS tablet Take 1,000 Units by mouth nightly    Yes Historical Provider, MD   docusate sodium (COLACE) 100 MG capsule Take 200 mg by mouth nightly    Yes Historical Provider, MD   Probiotic Product (PROBIOTIC & ACIDOPHILUS EX ST PO) Take 1 capsule by mouth daily    Yes Historical Provider, MD   Lysine 500 MG TABS Take 1 tablet by mouth nightly    Yes Historical Provider, MD   Magnesium 400 MG TABS Take 1 tablet by mouth nightly    Yes Historical Provider, MD       Current medications:    Current Outpatient Medications   Medication Sig Dispense Refill    omeprazole (PRILOSEC) 40 MG delayed release capsule Take 1 capsule by mouth daily 90 capsule 2    polycarbophil (FIBERCON) 625 MG tablet Take 625 mg by mouth daily      Cranberry 500 MG CAPS Take 500 mg by mouth daily      dofetilide (TIKOSYN) 250 MCG capsule Take 1 capsule by mouth 2 times daily 180 capsule 3    apixaban (ELIQUIS) 5 MG TABS tablet Take 1 tablet by mouth 2 times daily 180 tablet 3    aspirin EC 81 MG EC tablet Take 1 tablet by mouth daily 90 tablet 3    vitamin D (CHOLECALCIFEROL) 1000 UNIT TABS tablet Take 1,000 Units by mouth nightly       docusate sodium (COLACE) 100 MG capsule Take 200 mg by mouth nightly       Probiotic Product (PROBIOTIC & ACIDOPHILUS EX ST PO) Take 1 capsule by mouth daily       Lysine 500 MG TABS Take 1 tablet by mouth nightly       Magnesium 400 MG TABS Take 1 tablet by mouth nightly        No current facility-administered medications for this encounter. 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    Atrial fibrillation with rapid ventricular response (HCC) I48.91    Angina, class IV ([facility] [address] I20.9    Visit for monitoring Tikosyn therapy Z51.81, Z79.899    Dyspepsia R10.13    Other dysphagia R13.19    Antral erythema K31.9    Encounter for current long-term use of anticoagulants Z79.01    Severe mitral regurgitation by prior echocardiogram I34.0    Shortness of breath on exertion R06.02    Preoperative evaluation to rule out surgical contraindication Z01.818       Past Medical History:        Diagnosis Date    Cancer [facility])     Essential hypertension     Fracture of sacrum ([facility] [address] 2011    GERD (gastroesophageal reflux disease)     H/O cardiovascular stress test 02/28/2018    Equivocal myocardial perfusion study. There is a small/moderate perufsion defect of mild intensity in the apical an anteroapical regions during stress imaging which is most consistent with artifact but may be due to a small degree of coronary ischemia.  Overall, these results are most consistent with a low/intermediate risk for CAD.     H/O echocardiogram 02/02/2018    EF 60%. Mildly increased left ventricular wall thickness. No definite specific wall motion abnormalities. Bi-atrial enlargment. Myxomatous thickening of the mitral leaflets with bileaflet prolapse and moderat mitral regurg. Moderate tricuspid regurg. Mild pulmonary HTN with an estimated right ventricualr systolic pressure 05FVVI.      History of Holter monitoring 2016    PACs & PVCs    Irritable bowel syndrome     Mitral valve prolapse     Osteoarthritis     Osteopenia     Paroxysmal atrial fibrillation ([address] 2018    Rosacea     Tendinitis of left shoulder        Past Surgical History:        Procedure Laterality Date    BUNIONECTOMY      CARDIAC CATHETERIZATION Left 07/02/2020    DR Gross[facility]/right radial-    COLONOSCOPY  2008    CYSTOCELE REPAIR      ENDOSCOPY, COLON, DIAGNOSTIC      EYE SURGERY      HERNIA REPAIR      HYSTERECTOMY      MOHS SURGERY  2008    SKIN BIOPSY      LUBA AND BSO Bilateral 2014    TRANSESOPHAGEAL ECHOCARDIOGRAM  07/02/2020    Dr Joshua Telles/Unsuccessful attempt    UPPER GASTROINTESTINAL ENDOSCOPY N/A 12/10/2019    -antral erythema,bx(mild chronic inactive gastritis with focal intestinal metaplasia,neg H-Pylori)dilation    UPPER GASTROINTESTINAL ENDOSCOPY  12/10/2019    EGD DILATION SAVORY performed by Dmitry Koehler MD at [place] [address]  2003    Sling    VASCULAR SURGERY      VEIN SURGERY         Social History:    Social History     Tobacco Use    Smoking status: Never Smoker    Smokeless tobacco: Never Used   Substance Use Topics    Alcohol use: Yes     Comment: occasionally                                Counseling given: Not Answered      Vital Signs (Current):   Vitals:    07/09/20 1404 07/09/20 1405   BP: 123/61    Pulse: 60 60   Resp: 16    Temp: 36.1 °C (97 °F)    TempSrc: Tympanic    SpO2: 95%    Weight: 150 lb (68 kg) Height: 5' 4\" (1.626 m)                                               BP Readings from Last 3 Encounters:   07/09/20 123/61   07/02/20 119/67   07/02/20 (!) 104/52       NPO Status: Time of last liquid consumption: 0900                        Time of last solid consumption: 0800                        Date of last liquid consumption: 07/09/20                        Date of last solid food consumption: 07/09/20    BMI:   Wt Readings from Last 3 Encounters:   07/09/20 150 lb (68 kg)   07/02/20 150 lb (68 kg)   06/23/20 150 lb (68 kg)     Body mass index is 25.75 kg/m². CBC:   Lab Results   Component Value Date    WBC 4.3 06/29/2020    RBC 3.76 06/29/2020    RBC 5 04/09/2020    HGB 12.7 06/29/2020    HCT 39.6 06/29/2020    .3 06/29/2020    RDW 12.5 06/29/2020     06/29/2020       CMP:   Lab Results   Component Value Date     06/29/2020    K 4.2 06/29/2020     06/29/2020    CO2 28 06/29/2020    BUN 15 06/29/2020    CREATININE 0.63 06/29/2020    GFRAA >60 06/29/2020    LABGLOM >60 06/29/2020    GLUCOSE 99 06/29/2020    PROT 6.6 12/07/2018    CALCIUM 9.2 06/29/2020    BILITOT Negative 04/22/2020    ALKPHOS 98 12/07/2018    AST 26 12/07/2018    ALT 20 12/07/2018       POC Tests: No results for input(s): POCGLU, POCNA, POCK, POCCL, POCBUN, POCHEMO, POCHCT in the last 72 hours.     Coags: No results found for: PROTIME, INR, APTT    HCG (If Applicable): No results found for: PREGTESTUR, PREGSERUM, HCG, HCGQUANT     ABGs: No results found for: PHART, PO2ART, MOP9XIU, ESG9ZBC, BEART, L1SDWIJH     Type & Screen (If Applicable):  No results found for: LABABO, LABRH    Drug/Infectious Status (If Applicable):  No results found for: HIV, HEPCAB    COVID-19 Screening (If Applicable):   Lab Results   Component Value Date    COVID19 Not Detected 07/07/2020         Anesthesia Evaluation   no history of anesthetic complications:   Airway: Mallampati: I  TM distance: >3 FB   Neck ROM: full  Mouth opening: >

## 2020-07-09 NOTE — PROGRESS NOTES
Patient returned to pre/post area. Lethargic and oriented, denies pain. Patient aware BELEN unsuccessful. TLC given. Will continue to monitor.

## 2020-07-09 NOTE — ANESTHESIA POSTPROCEDURE EVALUATION
Department of Anesthesiology  Postprocedure Note    Patient: Marlyn Noriega  MRN: [id number]  YOB: 1940  Date of evaluation: 7/9/2020  Time:  3:44 PM     Procedure Summary     Date:  07/09/20 Room / Location:  [facility] Echocardiography; [facility] AT THE [facility] CATH LAB    Anesthesia Start:  5780 Anesthesia Stop:  1896    Procedure:  [facility] TRANSESOPHAGEAL ECHO CATH Diagnosis:       Cardiomegaly      Paroxysmal atrial fibrillation      Encounter for therapeutic drug level monitoring    Scheduled Providers:   Responsible Provider:  KATHRYN Carvajal CRNA    Anesthesia Type:  general, TIVA ASA Status:  4          Anesthesia Type: general, TIVA    Jack Phase I:      Jack Phase II:      Last vitals: Reviewed and per EMR flowsheets.        Anesthesia Post Evaluation    Patient location during evaluation: PACU  Patient participation: complete - patient participated  Level of consciousness: awake and alert  Pain score: 0  Airway patency: patent  Nausea & Vomiting: no nausea and no vomiting  Complications: no  Cardiovascular status: blood pressure returned to baseline  Respiratory status: acceptable  Hydration status: euvolemic

## 2020-07-13 ENCOUNTER — TELEPHONE (OUTPATIENT)
Dept: CARDIOLOGY | Age: 80
End: 2020-07-13

## 2020-07-13 NOTE — TELEPHONE ENCOUNTER
Ms. Ramos Dotson called and states she has changed her mind and does not want to see Dr Bains Said she is going to [facility] on 7/27 [employer] [phone]

## 2020-07-30 ENCOUNTER — TELEPHONE (OUTPATIENT)
Dept: CARDIOLOGY | Age: 80
End: 2020-07-30

## 2020-07-30 RX ORDER — AMOXICILLIN 500 MG/1
2000 CAPSULE ORAL ONCE
Qty: 4 CAPSULE | Refills: 1 | Status: SHIPPED | OUTPATIENT
Start: 2020-07-30 | End: 2020-07-30

## 2020-07-30 NOTE — TELEPHONE ENCOUNTER
Ms. Catrachito Glynn called and asked to have antibiotic sent in before dental appt today. I explained you are on vacation so she agreed with asking PCP if he could send it in.

## 2020-08-01 PROBLEM — Z01.818 PREOPERATIVE EVALUATION TO RULE OUT SURGICAL CONTRAINDICATION: Status: RESOLVED | Noted: 2020-07-02 | Resolved: 2020-08-01

## 2020-08-10 ENCOUNTER — OFFICE VISIT (OUTPATIENT)
Dept: CARDIOLOGY | Age: 80
End: 2020-08-10
Payer: MEDICARE

## 2020-08-10 VITALS
DIASTOLIC BLOOD PRESSURE: 74 MMHG | OXYGEN SATURATION: 98 % | WEIGHT: 152 LBS | RESPIRATION RATE: 20 BRPM | BODY MASS INDEX: 26.09 KG/M2 | SYSTOLIC BLOOD PRESSURE: 117 MMHG | HEART RATE: 57 BPM

## 2020-08-10 PROCEDURE — 99214 OFFICE O/P EST MOD 30 MIN: CPT | Performed by: FAMILY MEDICINE

## 2020-08-10 NOTE — PROGRESS NOTES
Jamie Hooper am scribing for and in the presence of Melissa Mclaughlin. Renato MARTIN, MS, F.A.C.C. Patient: Michaela De La Torre  : 1940  Date of Visit: August 10, 2020    REASON FOR VISIT / CONSULTATION: Follow-up (HX:PAF,mod mitral regurg,john. Denies CP and lightheaded/Dizziness. Pt stated she has SOB and palpatins at times.)      Dear Scar King MD,    I had the pleasure of seeing Michaela De La Torre today. Ms. Lesley Barriga is a 78 y.o. female with a history of paroxysmal atrial fibrillation. She reports that this 1st occurred a few year ago and most recently occurred first part of February  but she says she has always had some degree of palpitations. Her last previous sustained event was about a year ago. She also has a history of mitral valve prolapse but denies any heart attacks, other cardiac issues or stroke or mini stroke. She underwent a cardiovascular stress test on 2018 that had show to be consistent with low to intermediate risk of coronary artery disease. Her cardiac catheterization on 2018 had shown mild irregularities with the worst being in her LAD at 20-30%. She was also admitted to the hospital for tikosyn loading on 2018 and has been maintained on this ever since with good results. She had an Echo done on 2020 that showed the left atrium that is severely dilated (>40) with a left atrial volume index of 76 ml/m2. Bileaflet prolapse with more prominent P2 prolapse and moderate to severe mitral regurgitation. Moderate tricuspid regurgitation. Mild pulmonary hypertension with estimated pulmonary artery systolic pressure of 33 mmHg. Moderate diastolic dysfunction. Her EF was 60%. Since the last time I saw Ms. Springer, she continues to do well. She said she wanted to go to [facility] for mitral valve repair. She saw a doctor there on 2020 and he wants to do open heart and he wants to replace the valve with a pig valve.  He also wants to do an ablation procedure as well as a left atrial appendage oversew as well as a possible tricuspid valve repair if necessary. She denied any chest pain or discomfort, abdominal pain, bleeding problems, problems with her medications or any other concerns at this time. She denies any known repeat episodes of atrial fibrillation. Past Medical History:   Diagnosis Date    Cancer [facility])     Essential hypertension     Fracture of sacrum ([address] 2011    GERD (gastroesophageal reflux disease)     H/O cardiovascular stress test 02/28/2018    Equivocal myocardial perfusion study. There is a small/moderate perufsion defect of mild intensity in the apical an anteroapical regions during stress imaging which is most consistent with artifact but may be due to a small degree of coronary ischemia. Overall, these results are most consistent with a low/intermediate risk for CAD.     H/O echocardiogram 02/02/2018    EF 60%. Mildly increased left ventricular wall thickness. No definite specific wall motion abnormalities. Bi-atrial enlargment. Myxomatous thickening of the mitral leaflets with bileaflet prolapse and moderat mitral regurg. Moderate tricuspid regurg. Mild pulmonary HTN with an estimated right ventricualr systolic pressure 22AGHL.  History of Holter monitoring 2016    PACs & PVCs    Irritable bowel syndrome     Mitral valve prolapse     Osteoarthritis     Osteopenia     Paroxysmal atrial fibrillation (HCC) 2018    Rosacea     Tendinitis of left shoulder        CURRENT ALLERGIES: Butalbital-aspirin-caffeine and Other REVIEW OF SYSTEMS: 14 systems were reviewed. Pertinent positives and negatives as above, all else negative.      Past Surgical History:   Procedure Laterality Date    BUNIONECTOMY      CARDIAC CATHETERIZATION Left 07/02/2020    DR Gross/[facility] [place]/right radial-    COLONOSCOPY  2008    CYSTOCELE REPAIR      ENDOSCOPY, COLON, DIAGNOSTIC      EYE SURGERY      HERNIA REPAIR      HYSTERECTOMY      MOHS SURGERY  2008    SKIN BIOPSY Diabetes in her brother and mother; Heart Attack in her brother; Heart Disease in her mother; High Blood Pressure in her father. PHYSICAL EXAM:   /74 (Site: Right Upper Arm, Position: Sitting, Cuff Size: Medium Adult)   Pulse 57   Resp 20   Wt 152 lb (68.9 kg)   SpO2 98%   BMI 26.09 kg/m²  Body mass index is 26.09 kg/m². Constitutional: She is oriented to person, place, and time. She appears well-developed and well-nourished. In no acute distress. HEENT: Normocephalic and atraumatic. No JVD present. Carotid bruit is not present. No mass and no thyromegaly present. No lymphadenopathy present. Cardiovascular: Normal rate, regular rhythm, normal heart sounds. Exam reveals no gallop and no friction rubs. 3/6 systolic murmur, 5th intercostal space on the LEFT in the mid-clavicular line (cardiac apex). Pulmonary/Chest: Effort normal and breath sounds normal. No respiratory distress. She has no wheezes, rhonchi or rales. Abdominal: Soft, non-tender. Bowel sounds and aorta are normal. She exhibits no organomegaly, mass or bruit. Extremities:  No edema. No cyanosis and no clubbing. Pulses are 2+ radial and carotid pulses. 2+ dorsalis pedis and posterior tibial pulses bilaterally. Neurological: She is alert and oriented to person. No evidence of gross cranial nerve deficit. Coordination appeared normal.   Skin: Skin is warm and dry. There is no rash or diaphoresis. Psychiatric: She has a normal mood and affect.  Her speech is normal and behavior is normal.      MOST RECENT LABS ON RECORD:   Lab Results   Component Value Date    WBC 4.3 06/29/2020    HGB 12.7 06/29/2020    HCT 39.6 06/29/2020     06/29/2020    CHOL 180 05/11/2019    TRIG 39 05/11/2019    HDL 67 05/11/2019    ALT 20 12/07/2018    AST 26 12/07/2018     06/29/2020    K 4.2 06/29/2020     06/29/2020    CREATININE 0.63 06/29/2020    BUN 15 06/29/2020    CO2 28 06/29/2020    TSH 2.46 04/24/2018    LABA1C 5.7 12/07/2018 ASSESSMENT:  1. Severe mitral regurgitation by prior echocardiogram    2. Paroxysmal atrial fibrillation (HCC)    3. Mitral valve prolapse       PLAN:  · Severe Mitral Regurgitation: Probably at least mildy symptomatic: Left atrium is severely dilated (>40) with a left atrial volume index of 76 ml/m2 more consistent with severe mitral regurgitation combined with paroxysmal atrial fibrillation she has a Class IIa indication for corrective mitral valve surgery. · Beta Blocker Therapy: Not indicated due to history of bradycardia  ·  Her transesophageal echocardiogram was unsuccessful after multiple attempts. § I, again, spent about 25 minutes discussing Ms. Springer's left atrium enlargement and her medical options. In the end, she stated that she did want to proceed with evaluation for a possible mitral valve repair or replacement. She is going to go home and speak to her  and will let me know. I told her that if she would like, I would be happy to set ure up with Dr Marry Cook for a second opinion later this week or at latest next week. Paroxysmal Atrial Fibrillation: Rhythm Control    Beta Blocker: Not indicated due to history of Bradycardia   Rhythm Control Agent: Continue Dofetilide (Tikosyn) 250 mcg twice daily   Monitoring: Dofetilide (Tikosyn Since she will be maintained on Tikosyn I will continue to monitor her ECG's every 6 months and make sure his QTc remains less than 500 ms.    Stroke Risk: Her CHADS2-VASc score is greater than 2 (2.2% stroke risk)   [id number] Score for Atrial Fibrillation Stroke Risk   Risk   Factors  Component Value   C CHF No 0   H HTN Yes 1   A2 Age >= 76 Yes,  (75 y.o.) 2   D DM No 0   S2 Prior Stroke/TIA No 0   V Vascular Disease No 0   A Age 74-69 No,  (75 y.o.) 0   Sc Sex female 1    [id number]  Score  4   Score last updated 7/83/17 5:12 AM EDT  Click here for a link to the UpToDate guideline \"Atrial Fibrillation: Anticoagulation therapy to prevent embolization  Disclaimer: Risk Score calculation is dependent on accuracy of patient problem list and past encounter diagnosis. Anticoagulation: Continue Apixaban (Eliquis) 5 mg every 12 hours. I also reminded her to watch for signs of blood in her stool or black tarry stools and stop the medication immediately if this develops as this could be life threatening. Finally, I recommended that she continue her other medications and follow up with you as previously scheduled. FOLLOW UP:   I told Ms. Springer to call my office if she had any problems, but otherwise I asked her to Return in about 2 weeks (around 8/24/2020). However, I would be happy to see her sooner should the need arise. Sincerely,  Lonnie Freeman. Renato MARTIN, MS, F.A.C.C. [facility] Cardiology Specialist    [address] [place] [address], [facility], [address] [place] [address]  Phone: [phone], Fax: [phone]     I believe that the risk of significant morbidity and mortality related to the patient's current medical conditions are: intermediate-high. 25 minutes were spent with the patient,and/or arranging care with nursing and other members of the care team. All of the patients questions were answered. The documentation recorded by the scribe, accurately and completely reflects the services I personally performed and the decisions made by me. Saeed Escalante MD, MS, F.A.C.C.  August 10, 2020

## 2020-08-18 NOTE — PATIENT INSTRUCTIONS
you. Walking is a good choice. You also may want to swim, bike, or do other activities. Let your doctor know if your ability to exercise changes. · Do not smoke. If you need help quitting, talk to your doctor about stop-smoking programs and medicines. These can increase your chances of quitting for good. · Stay at a healthy weight. Lose weight if you need to. · Avoid colds and flu. Get a pneumococcal vaccine shot. If you have had one before, ask your doctor if you need another dose. Get a flu vaccine every year. · Take care of your teeth and gums. Get regular dental checkups. Good dental health is important because bacteria can spread from infected teeth and gums to the heart valves. When should you call for help? [id number] anytime you think you may need emergency care. For example, call if:  · You have severe trouble breathing. · You cough up pink, foamy mucus. · You have symptoms of a heart attack. These may include:  ? Chest pain or pressure, or a strange feeling in the chest.  ? Sweating. ? Shortness of breath. ? Nausea or vomiting. ? Pain, pressure, or a strange feeling in the back, neck, jaw, or upper belly or in one or both shoulders or arms. ? Lightheadedness or sudden weakness. ? A fast or irregular heartbeat. After you call 911, the  may tell you to chew 1 adult-strength or 2 to 4 low-dose aspirin. Wait for an ambulance. Do not try to drive yourself. Call your doctor now or seek immediate medical care if:  · You have new or increased shortness of breath. · You are dizzy or lightheaded, or you feel like you may faint. · You have sudden weight gain, such as more than 2 to 3 pounds in a day or 5 pounds in a week. (Your doctor may suggest a different range of weight gain.)  · You have increased swelling in your legs, ankles, or feet. · You are suddenly so tired or weak that you cannot do your usual activities.   Watch closely for changes in your health, and be sure to contact your doctor if you develop new symptoms. Where can you learn more? Go to [email or web address] org and sign in to your [email or web address] account. Enter G003 in the [facility] box to learn more about \"Mitral Valve Regurgitation: Care Instructions. \"     If you do not have an account, please click on the \"Sign Up Now\" link. Current as of: December 16, 2019               Content Version: 12.5  © 8582-2138 Healthwise, Incorporated. Care instructions adapted under license by [facility] ([facility]). If you have questions about a medical condition or this instruction, always ask your healthcare professional. [address] any warranty or liability for your use of this information.

## 2020-08-18 NOTE — PROGRESS NOTES
[facility] Cardiothoracic Surgery  CONSULTATION      CC: Severe mitral valve regurgitation, second opinion    HPI:  Ms. Yolette Mancia is a 78 y.o.  female who presents with severe mitral valve regurgitation s/p echocardiogram with Dr. Nadeen Duckworth. Patient initially met with a surgeon at [facility] on 7/27/2020 at which time she was offered open heart with bioprosthetic valve replacement. She was referred to our clinic today for a second opinion. Pertinent medical history includes paroxysmal atrial fibrillation, mitral valve prolapse, essential hypertension and GERD. Cardiac workup has revealed moderate to severe mitral regurgitation, bileaflet prolapse, moderate tricuspid regurgitation and mild pulmonary hypertension. Patient admits to intermittent shortness of breath and occasional palpitations. Denies denies chest pain, fever/chills and nausea/vomiting. She has been referred for consideration of mitral valve repair/replace versus mitral clip. PMH:   has a past medical history of Cancer [facility]), Essential hypertension, Fracture of sacrum ([address] (2011), GERD (gastroesophageal reflux disease), H/O cardiovascular stress test (02/28/2018), H/O echocardiogram (02/02/2018), History of Holter monitoring (2016), Irritable bowel syndrome, Mitral valve prolapse, Osteoarthritis, Osteopenia, Paroxysmal atrial fibrillation ([address] (2018), Rosacea, and Tendinitis of left shoulder. PSH:   has a past surgical history that includes Bunionectomy; hernia repair; sathish and bso (cervix removed) (Bilateral, 2014); Mohs surgery (2008); Vein Surgery; Vagina surgery (2003); Cystocele repair; Colonoscopy (2008); Upper gastrointestinal endoscopy (N/A, 12/10/2019); Upper gastrointestinal endoscopy (12/10/2019); skin biopsy; eye surgery; Endoscopy, colon, diagnostic; Hysterectomy; vascular surgery; transesophageal echocardiogram (07/02/2020);  Cardiac catheterization (Left, 07/02/2020); and transesophageal echocardiogram (07/09/2020). Allergies: Allergies   Allergen Reactions    Butalbital-Aspirin-Caffeine     Other      Fiorinal        Medications:  Current Outpatient Medications:     omeprazole (PRILOSEC) 40 MG delayed release capsule, Take 1 capsule by mouth daily, Disp: 90 capsule, Rfl: 2    polycarbophil (FIBERCON) 625 MG tablet, Take 625 mg by mouth daily, Disp: , Rfl:     Cranberry 500 MG CAPS, Take 500 mg by mouth daily, Disp: , Rfl:     dofetilide (TIKOSYN) 250 MCG capsule, Take 1 capsule by mouth 2 times daily, Disp: 180 capsule, Rfl: 3    apixaban (ELIQUIS) 5 MG TABS tablet, Take 1 tablet by mouth 2 times daily, Disp: 180 tablet, Rfl: 3    aspirin EC 81 MG EC tablet, Take 1 tablet by mouth daily, Disp: 90 tablet, Rfl: 3    vitamin D (CHOLECALCIFEROL) 1000 UNIT TABS tablet, Take 1,000 Units by mouth nightly , Disp: , Rfl:     docusate sodium (COLACE) 100 MG capsule, Take 200 mg by mouth nightly , Disp: , Rfl:     Probiotic Product (PROBIOTIC & ACIDOPHILUS EX ST PO), Take 1 capsule by mouth daily , Disp: , Rfl:     Lysine 500 MG TABS, Take 1 tablet by mouth nightly , Disp: , Rfl:     Magnesium 400 MG TABS, Take 1 tablet by mouth nightly , Disp: , Rfl:     Social Hx:    reports that she has never smoked. She has never used smokeless tobacco.    Family Hx:  family history includes Arthritis in her sister and sister; Atrial Fibrillation in her sister; Diabetes in her brother and mother; Heart Attack in her brother; Heart Disease in her mother; High Blood Pressure in her father. ROS:    Review of Systems   Constitutional: Positive for fatigue. Negative for fever. HENT: Negative for congestion. Eyes: Negative for visual disturbance. Respiratory: Positive for shortness of breath. Dyspnea on exertion, resolves with rest   Cardiovascular: Negative for chest pain. Intermittent mild edema of BLE   Gastrointestinal: Negative for abdominal pain, constipation and diarrhea.    Genitourinary: Negative for dysuria. Musculoskeletal: Negative for gait problem. Skin: Negative for color change. Neurological: Negative for dizziness and light-headedness. Generalized weakness   Psychiatric/Behavioral: Positive for sleep disturbance. The patient is nervous/anxious. Physical Examination    Vitals: There were no vitals filed for this visit. Physical Exam  Constitutional:       General: She is not in acute distress. Appearance: Normal appearance. She is normal weight. HENT:      Head: Normocephalic. Nose: Nose normal.      Mouth/Throat:      Mouth: Mucous membranes are moist.      Pharynx: Oropharynx is clear. Eyes:      Extraocular Movements: Extraocular movements intact. Conjunctiva/sclera: Conjunctivae normal.      Pupils: Pupils are equal, round, and reactive to light. Neck:      Musculoskeletal: Normal range of motion. Cardiovascular:      Rate and Rhythm: Normal rate and regular rhythm. Pulses: Normal pulses. Heart sounds: Murmur present. No friction rub. No gallop. Pulmonary:      Effort: Pulmonary effort is normal. No respiratory distress. Breath sounds: Normal breath sounds. Abdominal:      General: Bowel sounds are normal.      Palpations: Abdomen is soft. Tenderness: There is no abdominal tenderness. Musculoskeletal: Normal range of motion. Skin:     General: Skin is warm and dry. Neurological:      General: No focal deficit present. Mental Status: She is alert and oriented to person, place, and time. Psychiatric:         Mood and Affect: Mood normal.         Behavior: Behavior normal.         Labs:   CBC: No results for input(s): WBC, HGB, HCT, MCV, PLT in the last 72 hours. BMP:No results for input(s): NA, K, CL, CO2, PHOS, BUN, CREATININE, MG in the last 72 hours. Invalid input(s): CA  Accucheck Glucoses:No results for input(s): POCGLU in the last 72 hours.   Cardiac Enzymes: No results for input(s): CKTOTAL, CKMB, Shane Lipps in the last 72 hours. PT/INR: No results for input(s): PROTIME, INR in the last 72 hours. APTT: No results for input(s): APTT in the last 72 hours. Liver Profile:  Lab Results   Component Value Date    AST 26 12/07/2018    ALT 20 12/07/2018    BILITOT Negative 04/22/2020    ALKPHOS 98 12/07/2018     Lab Results   Component Value Date    CHOL 180 05/11/2019    HDL 67 05/11/2019    TRIG 39 05/11/2019     TSH:   Lab Results   Component Value Date    TSH 2.46 04/24/2018     UA:   Lab Results   Component Value Date    NITRITE neg 03/11/2020    COLORU YELLOW 04/22/2020    PHUR 7.0 03/11/2020    SPECGRAV 1.010 03/11/2020    LEUKOCYTESUR Negative 04/22/2020    UROBILINOGEN <1.1 04/22/2020    BILIRUBINUR neg 03/11/2020    BLOODU moderate 03/11/2020    GLUCOSEU neg 03/11/2020         Testing:  Cardiac cath:  No significant coronary artery disease with a normal left ventricular end   diastolic pressure (LVEDP). Recommendations      Consult to cardiothoracic surgery for consideration for corrective mitral   valve surgery. Signature      ----------------------------------------------------------------   Electronically signed by Jose Eduardo Gross(Performing Physician) on   07/02/2020 13:36   ----------------------------------------------------------------      Angiographic Findings      Cardiac Arteries and Lesion Findings     LMCA: Mild irregularities 10-20%. LAD: Mild irregularities 10-20%. LCx: Mild irregularities 10-20%. RCA: Mild irregularities 10-20%. Coronary Tree Dominance: Right      Echo: Global left ventricular systolic function appears preserved with an  estimated ejection fraction of 60%. The left ventricular cavity size is within normal limits and the left  ventricular wall thickness is moderately increased. No definite specific wall motion abnormalities were identified. The left atrium is severely dilated (>40) with a left atrial volume index of  76 ml/m2.   Bileaflet prolapse with more prominent P2 prolapse and moderate to severe  mitral regurgitation. Moderate tricuspid regurgitation. Mild pulmonary hypertension with estimated pulmonary artery systolic  pressure of 33 mmHg. Moderate diastolic dysfunction. Giving moderate to severe mitral regurgitation, please consider BELEN or  corrective valve surgery if clinically indicated particularly that the valve  seems to be repairable by TTE. Signature  ----------------------------------------------------------------------------   Electronically signed by Delphine Grey(Sonographer) on 06/19/2020 02:36   PM  ----------------------------------------------------------------------------     ----------------------------------------------------------------------------   Electronically signed by Vaishali Sandoval(Interpreting physician) on 06/19/2020   03:44 PM  ----------------------------------------------------------------------------  FINDINGS  Left Atrium  The left atrium is severely dilated (>40) with a left atrial volume index of  76 ml/m2. Left Ventricle  Global left ventricular systolic function appears preserved with an  estimated ejection fraction of 60%. The left ventricular cavity size is within normal limits and the left  ventricular wall thickness is moderately increased. No definite specific wall motion abnormalities were identified. Right Atrium  Dilated right atrium. .  Right Ventricle  Normal right ventricular size and function. Mitral Valve  Myxomatous thickening of the mitral leaflets. Bileaflet prolapse with more prominent P2 prolapse and moderate to severe  mitral regurgitation. Aortic Valve  Normal aortic valve structure and function without stenosis or  regurgitation. Tricuspid Valve  Normal tricuspid valve structure with moderate tricuspid regurgitation. Mild pulmonary hypertension with estimated pulmonary artery systolic  pressure of 33 mmHg.   Pulmonic Valve  Pulmonic valve is normal in structure and function. Pericardial Effusion  No significant pericardial effusion is seen. Miscellaneous  Normal aortic root diameter. Moderate diastolic dysfunction. Imaging Studies:  I have personally reviewed the testing/imaging, and agree with the findings listed above. In summary, Ms. Delphine Christiansen is a 78y.o. year-old female with severe mitral regurgitation. Problem List:    Severe mitral regurgitation with bileaflet mitral valve prolapse   Moderate tricuspid regurgitation   Paroxysmal atrial fibrillation   Mild pulmonary hypertension   Essential hypertension   GERD    Recommendation:  Ms Delphine Christiansen has been provided with the option between surgical replacement of the mitral valve as well as transvenous repair via mitral clip. Helen Mccoy NP and Supriya Ramirez RN, control heart team, present to discuss mitral clip at length with patient and spouse. Patient encouraged to discuss options with family and contact clinic with further questions or decision on intervention. Thank you for your consideration and inclusion of our cardiothoracic team in this patient's care.     Jenise Jose, KATHRYN - CNP

## 2020-08-19 ENCOUNTER — INITIAL CONSULT (OUTPATIENT)
Dept: CARDIOTHORACIC SURGERY | Age: 80
End: 2020-08-19
Payer: MEDICARE

## 2020-08-19 VITALS
DIASTOLIC BLOOD PRESSURE: 74 MMHG | TEMPERATURE: 97.9 F | WEIGHT: 150 LBS | SYSTOLIC BLOOD PRESSURE: 129 MMHG | HEART RATE: 66 BPM | RESPIRATION RATE: 18 BRPM | HEIGHT: 63 IN | OXYGEN SATURATION: 96 % | BODY MASS INDEX: 26.58 KG/M2

## 2020-08-19 PROCEDURE — 99213 OFFICE O/P EST LOW 20 MIN: CPT | Performed by: NURSE PRACTITIONER

## 2020-08-19 ASSESSMENT — ENCOUNTER SYMPTOMS
CONSTIPATION: 0
DIARRHEA: 0
SHORTNESS OF BREATH: 1
COLOR CHANGE: 0
ABDOMINAL PAIN: 0

## 2020-08-24 ENCOUNTER — TELEPHONE (OUTPATIENT)
Dept: CARDIOLOGY | Age: 80
End: 2020-08-24

## 2020-08-24 NOTE — TELEPHONE ENCOUNTER
Ms. Cydney Nielsen called back and stated that she no longer needs you to call her. She was feeling better. She also wanted to just let you know that she is going to go to [facility] for her surgery and she stated she was going to make her own apt with them due to her all ready starting the process her self. She also wanted to cancel her Sept apts due to not needing to see us so soon. I did resceduled her for Oct hopefully after her surgery at [facility]. Thanks!

## 2020-08-24 NOTE — TELEPHONE ENCOUNTER
Brennen Cannon would like you to call her @ [phone] to discuss her consult with Dr. Tashia Willis. She would like your help in deciding what to do. Her symptoms are worsening (SOB, ?  Angina)

## 2020-09-01 ENCOUNTER — TELEPHONE (OUTPATIENT)
Dept: CARDIOLOGY | Age: 80
End: 2020-09-01

## 2020-09-01 NOTE — TELEPHONE ENCOUNTER
Velia Hairston called with several questions pertaining to her upcoming Mitral Valve Surgery in a few weeks:    1) Should she have her flu shot before surgery? 2) Daughter wants her to take 1000 mg Vitamin C and 50 mg Zinc supplements as a preventative (?Covid), is this ok? 3) She has a lump on her right wrist where the cath happened. No bruising or swelling, just wanted you to know. Please advise. Thank you!

## 2020-09-02 NOTE — TELEPHONE ENCOUNTER
Do you want to see her for the lump at her cath site? LVM for pt regarding questions to be addressed by surgeon.

## 2020-09-02 NOTE — TELEPHONE ENCOUNTER
Please let patient know that if she would like to come in to have the lump on her hand reviewed that is fine but this is not to unusual and so as long as not getting worse we can assess at her next visit. Thanks.

## 2020-09-02 NOTE — TELEPHONE ENCOUNTER
Please have her talk to her surgeon about what they would like her to do. Those questions are really up to him. Thanks.

## 2020-10-14 ENCOUNTER — TELEPHONE (OUTPATIENT)
Dept: PHARMACY | Age: 80
End: 2020-10-14

## 2020-10-14 NOTE — TELEPHONE ENCOUNTER
Recent Travel Screening and Travel History documentation:     Travel Screening     Question   Response    In the last month, have you been in contact with someone who was confirmed or suspected to have Coronavirus / COVID-19? No / Unsure    Have you had a COVID-19 viral test in the last 14 days? No    Do you have any of the following new or worsening symptoms? None of these    Have you traveled internationally in the last month? No      Travel History   Travel since 09/14/20     No documented travel since 09/14/20         Patient denies S/S covid/travel screen. Provided instruction for [facility] INR check/appiontment. Jl states understanding. She will be in a [employer].

## 2020-10-15 ENCOUNTER — HOSPITAL ENCOUNTER (OUTPATIENT)
Age: 80
Discharge: HOME | End: 2020-10-15
Payer: MEDICARE

## 2020-10-15 ENCOUNTER — HOSPITAL ENCOUNTER (OUTPATIENT)
Dept: PHARMACY | Age: 80
Setting detail: THERAPIES SERIES
Discharge: HOME | End: 2020-10-15
Payer: MEDICARE

## 2020-10-15 ENCOUNTER — OFFICE VISIT (OUTPATIENT)
Dept: CARDIOLOGY | Age: 80
End: 2020-10-15
Payer: MEDICARE

## 2020-10-15 VITALS
HEART RATE: 79 BPM | WEIGHT: 152.8 LBS | SYSTOLIC BLOOD PRESSURE: 101 MMHG | OXYGEN SATURATION: 99 % | HEIGHT: 63 IN | DIASTOLIC BLOOD PRESSURE: 66 MMHG | RESPIRATION RATE: 18 BRPM | BODY MASS INDEX: 27.07 KG/M2

## 2020-10-15 VITALS — DIASTOLIC BLOOD PRESSURE: 55 MMHG | SYSTOLIC BLOOD PRESSURE: 87 MMHG | TEMPERATURE: 97.9 F | HEART RATE: 76 BPM

## 2020-10-15 LAB
ANION GAP SERPL CALCULATED.3IONS-SCNC: 10 MMOL/L (ref 9–17)
BUN BLDV-MCNC: 13 MG/DL (ref 8–23)
BUN/CREAT BLD: 19 (ref 9–20)
CALCIUM SERPL-MCNC: 8.4 MG/DL (ref 8.6–10.4)
CHLORIDE BLD-SCNC: 100 MMOL/L (ref 98–107)
CO2: 25 MMOL/L (ref 20–31)
CREAT SERPL-MCNC: 0.68 MG/DL (ref 0.5–0.9)
GFR AFRICAN AMERICAN: >60 ML/MIN
GFR NON-AFRICAN AMERICAN: >60 ML/MIN
GFR SERPL CREATININE-BSD FRML MDRD: ABNORMAL ML/MIN/{1.73_M2}
GFR SERPL CREATININE-BSD FRML MDRD: ABNORMAL ML/MIN/{1.73_M2}
GLUCOSE BLD-MCNC: 109 MG/DL (ref 70–99)
HCT VFR BLD CALC: 26.4 % (ref 36.3–47.1)
HEMOGLOBIN: 8.2 G/DL (ref 11.9–15.1)
INR BLD: 3.1
MCH RBC QN AUTO: 32.2 PG (ref 25.2–33.5)
MCHC RBC AUTO-ENTMCNC: 31.1 G/DL (ref 28.4–34.8)
MCV RBC AUTO: 103.5 FL (ref 82.6–102.9)
NRBC AUTOMATED: 0 PER 100 WBC
PDW BLD-RTO: 18.4 % (ref 11.8–14.4)
PLATELET # BLD: 170 K/UL (ref 138–453)
PMV BLD AUTO: 8.9 FL (ref 8.1–13.5)
POTASSIUM SERPL-SCNC: 4 MMOL/L (ref 3.7–5.3)
RBC # BLD: 2.55 M/UL (ref 3.95–5.11)
SODIUM BLD-SCNC: 135 MMOL/L (ref 135–144)
WBC # BLD: 5.3 K/UL (ref 3.5–11.3)

## 2020-10-15 PROCEDURE — 85610 PROTHROMBIN TIME: CPT

## 2020-10-15 PROCEDURE — 99215 OFFICE O/P EST HI 40 MIN: CPT | Performed by: FAMILY MEDICINE

## 2020-10-15 PROCEDURE — 85027 COMPLETE CBC AUTOMATED: CPT

## 2020-10-15 PROCEDURE — 80048 BASIC METABOLIC PNL TOTAL CA: CPT

## 2020-10-15 PROCEDURE — 93000 ELECTROCARDIOGRAM COMPLETE: CPT | Performed by: FAMILY MEDICINE

## 2020-10-15 PROCEDURE — 99213 OFFICE O/P EST LOW 20 MIN: CPT

## 2020-10-15 PROCEDURE — 36415 COLL VENOUS BLD VENIPUNCTURE: CPT

## 2020-10-15 RX ORDER — FUROSEMIDE 40 MG/1
40 TABLET ORAL DAILY
COMMUNITY
Start: 2020-10-12 | End: 2020-10-27

## 2020-10-15 RX ORDER — POTASSIUM CHLORIDE 20 MEQ/1
20 TABLET, EXTENDED RELEASE ORAL DAILY
COMMUNITY
Start: 2020-10-12 | End: 2020-10-22

## 2020-10-15 RX ORDER — POLYETHYLENE GLYCOL 3350 17 G/17G
17 POWDER, FOR SOLUTION ORAL DAILY PRN
COMMUNITY

## 2020-10-15 RX ORDER — WARFARIN SODIUM 3 MG/1
3 TABLET ORAL DAILY
COMMUNITY
Start: 2020-10-12 | End: 2020-11-11 | Stop reason: SDUPTHER

## 2020-10-15 NOTE — PROGRESS NOTES
[facility]  ANTICO[facility]ULATION    Referring Doctor: Laith Sabillon INR: 2-3    TODAY'S INR: 3.1    WARFARIN Dosage: Patient will hold warfarin today then take half tablet for 1.5 mg on Sundays and whole 3 mg tablet all other days. Saw patient CURBSIDE. INR (no units)   Date Value   10/15/2020 3.1       Hemoglobin (g/dL)   Date Value   06/29/2020 12.7   12/07/2018 12.6   05/04/2018 12.1     Hematocrit (%)   Date Value   06/29/2020 39.6   12/07/2018 39.8   05/04/2018 35.9 (L)     Platelets (k/uL)   Date Value   06/29/2020 166   12/07/2018 172   05/04/2018 152     Significant Drug-Drug Interactions:  Patient had some doses of AMIODARONE in the hospital but is not longer taking. Notes:    Fingerstick INR drawn per clinic protocol. Patient states no visible blood in urine and no black tarry stool. Denies any missed doses of warfarin. No change in other maintenance medications or in diet. Will recheck INR on Monday after Dr Luther Gomes appt. Patient acknowledges working in consult agreement with pharmacist as referred by his/her physician. Warfarin Patient Education: The following education has been conducted during the clinic appointment as indicated. Kanika Jeff 10/15/2020, 8:32 AM  1. Patient has been educated that warfarin is a blood thinner. 2. Patient has been educated on why he or she has been prescribed warfarin. 3. Patient has been educated that warfarin can cause bleeding. 4. Patient has been educated on the necessity of regular INR monitoring. 5. Patient has been educated to take the exact amount of warfarin prescribed each day and that the dosing regimen could possibly vary from day to day. 6. Patient has been educated that medication aids such as pill boxes, calendars, etc., are a good idea to aid with warfarin therapy. 7. Patient has been educated on his or her target INR range.     8. Patient has been educated on the signs of bleeding problems. 9. Patient has been educated to seek immediate medical attention for severe bleeding issues. 10. Patient has been educated that warfarin has many drug interactions. 11. Patient has been educated to notify their provider managing warfarin prior to taking any new medications, including over-the-counter products and herbal products. 12. Patient has been educated to always keep a current medications list.    13. Patient has been educated on the effect of vitamin K on warfarin. 14. Patient has been educated on what foods contain high amounts of vitamin K.    15. Patient has been encouraged to eat a diet with a stable amount of vitamin K intake. 16. Patient has been educated on the effects of alcohol (ethanol) on warfarin. 16. Patient has been educated that warfarin is not intended for pregnant patients or those who plan to become pregnant. 18. Patient has been educated to take warfarin at the same time every day, preferably in the evening. 19. Patient has been educated on what to do if he or she misses a dose of warfarin. 20. Patient has been educated to call the healthcare provider managing warfarin is he or she gets diarrhea, has an infection, or has a fever, as this can affect warfarin responsiveness. 21. Patient has been educated to contact the healthcare provider managing warfarin if he or she will have any planned surgeries or other medical and dental procedures. 22. Patient has been educated to seek medical attention for serious falls, particularly if the fall injures his or her head. 23. Patient has been educated to avoid contact sports and activities that may cause serious injury. 24. Patient has been educated that he or she should either carry an ID badge or bracelet saying he or she is on warfarin.     25. Patient has been instructed that special arrangements may need to be made for monitoring during extended travel and that all extended travel plans should be shared early with his or her anticoagulation provider. 26. Patient has been given written warfarin education materials to supplement verbal education. CLINICAL PHARMACY CONSULT: MED RECONCILIATION/REVIEW ADDENDUM    For Pharmacy Admin Tracking Only    PHSO: Yes  Total # of Interventions Recommended: 6  - Decreased Dose #: 2  - Discontinued Medication #: 3 Discontinue Reason(s): No Longer Used  - New Order #: 1 New Medication Order Reason(s): Needs Additional Medication Therapy  - Maintenance Safety Lab Monitoring #: 1  Recommended intervention potential cost savings:   Accepted intervention potential cost savings:    Total Interventions Accepted: 7  Time Spent (min): 60    Waqas Sarkar, PharmD

## 2020-10-15 NOTE — PATIENT INSTRUCTIONS
Continue to monitor urine and stool. Continue to monitor for signs of bleeding. Return to clinic in 1 week.    hold warfarin today then take half tablet for 1.5 mg on Sundays and whole 3 mg tablet all other days.

## 2020-10-15 NOTE — PROGRESS NOTES
Left 07/02/2020    DR Gross/[facility]/right radial-    COLONOSCOPY  2008    CYSTOCELE REPAIR      ENDOSCOPY, COLON, DIAGNOSTIC      EYE SURGERY      HERNIA REPAIR      HYSTERECTOMY      MOHS SURGERY  2008    SKIN BIOPSY      LUBA AND BSO Bilateral 2014    TRANSESOPHAGEAL ECHOCARDIOGRAM  07/02/2020    Dr Kate Telles/Unsuccessful attempt    TRANSESOPHAGEAL ECHOCARDIOGRAM  07/09/2020    Dr Ruby Telles/Failed, anesthesia sedate    UPPER GASTROINTESTINAL ENDOSCOPY N/A 12/10/2019    -antral erythema,bx(mild chronic inactive gastritis with focal intestinal metaplasia,neg H-Pylori)dilation    UPPER GASTROINTESTINAL ENDOSCOPY  12/10/2019    EGD DILATION SAVORY performed by Donald Frazier MD at [place] [address]  2003    Sling    VASCULAR SURGERY      VEIN SURGERY      Social History:  Social History     Tobacco Use    Smoking status: Never Smoker    Smokeless tobacco: Never Used   Substance Use Topics    Alcohol use: Never     Frequency: Never     Drinks per session: Patient refused     Binge frequency: Never     Comment: occasionally    Drug use: No        CURRENT MEDICATIONS:  Outpatient Medications Marked as Taking for the 10/15/20 encounter (Office Visit) with Bethany Gautam MD   Medication Sig Dispense Refill    furosemide (LASIX) 40 MG tablet Take 20 mg by mouth 2 times daily       metoprolol tartrate (LOPRESSOR) 25 MG tablet Take 12.5 mg by mouth 2 times daily      potassium chloride (KLOR-CON M) 20 MEQ extended release tablet Take 20 mEq by mouth daily      warfarin (COUMADIN) 3 MG tablet Take 3 mg by mouth daily As directed by Coumadin Clinic      polyethylene glycol (GLYCOLAX) 17 g packet Take 17 g by mouth daily as needed for Constipation      omeprazole (PRILOSEC) 40 MG delayed release capsule Take 1 capsule by mouth daily 90 capsule 2    aspirin EC 81 MG EC tablet Take 1 tablet by mouth daily 90 tablet 3    vitamin D (CHOLECALCIFEROL) 1000 UNIT TABS tablet Take 1,000 Units by mouth nightly       docusate sodium (COLACE) 100 MG capsule Take 100 mg by mouth daily       Magnesium 400 MG TABS Take 1 tablet by mouth nightly          FAMILY HISTORY: family history includes Arthritis in her sister and sister; Atrial Fibrillation in her sister; Diabetes in her brother and mother; Heart Attack in her brother; Heart Disease in her mother; High Blood Pressure in her father. PHYSICAL EXAM:   /66 (Site: Right Upper Arm, Position: Sitting, Cuff Size: Medium Adult)   Pulse 79   Resp 18   Ht 5' 2.99\" (1.6 m)   Wt 152 lb 12.8 oz (69.3 kg)   SpO2 99%   BMI 27.07 kg/m²  Body mass index is 27.07 kg/m². Constitutional: She is oriented to person, place, and time. She appears well-developed and well-nourished. In no acute distress. HEENT: Normocephalic and atraumatic. No JVD present. Carotid bruit is not present. No mass and no thyromegaly present. No lymphadenopathy present. Cardiovascular: Normal rate, regular rhythm, normal heart sounds. Exam reveals no gallop and no friction rubs. 3/6 systolic murmur, 2nd intercostal space on the RIGHT just lateral to the sternum. Pulmonary/Chest: Effort normal and breath sounds normal. No respiratory distress. She has no wheezes, rhonchi or rales. Abdominal: Soft, non-tender. Bowel sounds and aorta are normal. She exhibits no organomegaly, mass or bruit. Extremities:  Trace-1+ lower extremity pitting pedal edema. 1/2 way up to the knees bilaterally  No cyanosis and no clubbing. Pulses are 2+ radial and carotid pulses. 2+ dorsalis pedis and posterior tibial pulses bilaterally. Neurological: She is alert and oriented to person. No evidence of gross cranial nerve deficit. Coordination appeared normal.   Skin: Skin is warm and dry. There is no rash or diaphoresis. Psychiatric: She has a normal mood and affect.  Her speech is normal and behavior is normal.      MOST RECENT LABS ON RECORD: Lab Results   Component Value Date    WBC 4.3 06/29/2020    HGB 12.7 06/29/2020    HCT 39.6 06/29/2020     06/29/2020    CHOL 180 05/11/2019    TRIG 39 05/11/2019    HDL 67 05/11/2019    ALT 20 12/07/2018    AST 26 12/07/2018     06/29/2020    K 4.2 06/29/2020     06/29/2020    CREATININE 0.63 06/29/2020    BUN 15 06/29/2020    CO2 28 06/29/2020    TSH 2.46 04/24/2018    INR 3.1 10/15/2020    LABA1C 5.7 12/07/2018       ASSESSMENT:  1. Severe mitral regurgitation by prior echocardiogram    2. S/P mitral valve replacement with bioprosthetic valve    3. Paroxysmal atrial fibrillation (HCC)    4. S/P Maze operation for atrial fibrillation    5. SOB (shortness of breath)       PLAN:  Shortness of breath with moderate exertion: worse than prior to surgery but says slowly improving since surgery  · Already scheduled to have a follow up chest xray with [facility] and likely has small right pleural effusion. Continue lasix down titration as ordered  · Additional Testing List: I took the liberty of ordering a BMP for today to assess their potassium and renal function and I took the liberty of ordering a CBC    · Severe Mitral Regurgitation: s/p mitral valve replacement done on 10/6/2020, with 29 mm Epic valve (Bioprosthetic)  · Beta Blocker Therapy: Continue Metoprolol tartrate (Lopressor)  12.5 mg  twice daily. · Diuretics: Continue furosemide (Lasix) 20 mg 2 times daily. for 4 days, then she is to decrease her dose to 20 mg daily, then after those 8 days she is to stop the medication completely. · Her wound is red, but has no drainage. I do want her to keep an eye on this and if she notices it worsening she is to call the office and I will prescribe her an antibiotic. · We discussed the potential benefits of cardiac rehab to improve both her cardiac condition as well as improve her exercise tolerance and overall quality of life.   She was very agreeable with this and therefore I made the referral for Phase II cardiac rehab. Paroxysmal Atrial Fibrillation: Rhythm Control  S/p MAZE procedure on 10/6/2020  Beta Blocker Therapy: Continue Metoprolol tartrate (Lopressor)  12.5 mg  twice daily    Rhythm Control Agent: Not indicated  Stroke Risk: Her CHADS2-VASc score is greater than 2 (2.2% stroke risk)   [id number] Score for Atrial Fibrillation Stroke Risk   Risk   Factors  Component Value   C CHF No 0   H HTN Yes 1   A2 Age >= 76 Yes,  [de-identified] y.o.) 2   D DM No 0   S2 Prior Stroke/TIA No 0   V Vascular Disease No 0   A Age 74-69 No,  [de-identified] y.o.) 0   Sc Sex female 1    [id number]  Score  4   Score last updated 4/43/02 9:62 AM EDT  Click here for a link to the UpToDate guideline \"Atrial Fibrillation: Anticoagulation therapy to prevent embolization  Disclaimer: Risk Score calculation is dependent on accuracy of patient problem list and past encounter diagnosis. Anticoagulation: Continue Warfarin. Goal INR 2-3. I also reminded her to watch for signs of blood in her stool or black tarry stools and stop the medication immediately if this develops as this could be life threatening. Finally, I recommended that she continue her other medications and follow up with you as previously scheduled. FOLLOW UP:   I told Ms. Springer to call my office if she had any problems, but otherwise I asked her to Return in about 2 weeks (around 10/29/2020). However, I would be happy to see her sooner should the need arise. Sincerely,  Alecia Hays. Renato MARTIN, MS, F.A.C.C. [facility] Cardiology Specialist    [address] [place] [address]  Phone: [phone], Fax: [phone]     I believe that the risk of significant morbidity and mortality related to the patient's current medical conditions are: intermediate-high. 40 minutes were spent with the patient,and/or arranging care with nursing and other members of the care team. All of the patients questions were answered.        The documentation recorded by the scribe, accurately and completely reflects the services I personally performed and the decisions made by me. Reed Maya MD, MS, F.A.C.C.  October 15, 2020

## 2020-10-16 ENCOUNTER — TELEPHONE (OUTPATIENT)
Dept: PHARMACY | Age: 80
End: 2020-10-16

## 2020-10-16 ENCOUNTER — TELEPHONE (OUTPATIENT)
Dept: CARDIOLOGY | Age: 80
End: 2020-10-16

## 2020-10-16 NOTE — TELEPHONE ENCOUNTER
Recent Travel Screening and Travel History documentation:     Travel Screening     Question   Response    In the last month, have you been in contact with someone who was confirmed or suspected to have Coronavirus / COVID-19? No / Unsure    Have you had a COVID-19 viral test in the last 14 days? No    Do you have any of the following new or worsening symptoms? None of these    Have you traveled internationally in the last month? No      Travel History   Travel since 09/16/20     No documented travel since 09/16/20         Patient denies s/s covid/travel screen. Provided instruction for coumadin clinic [facility] INR check/appointment. Patient states understanding.

## 2020-10-19 ENCOUNTER — HOSPITAL ENCOUNTER (OUTPATIENT)
Dept: PHARMACY | Age: 80
Setting detail: THERAPIES SERIES
Discharge: HOME | End: 2020-10-19
Payer: MEDICARE

## 2020-10-19 VITALS
SYSTOLIC BLOOD PRESSURE: 91 MMHG | TEMPERATURE: 98 F | DIASTOLIC BLOOD PRESSURE: 53 MMHG | BODY MASS INDEX: 27.29 KG/M2 | HEART RATE: 75 BPM | WEIGHT: 154 LBS

## 2020-10-19 LAB — INR BLD: 2.2

## 2020-10-19 PROCEDURE — 85610 PROTHROMBIN TIME: CPT

## 2020-10-19 PROCEDURE — 99211 OFF/OP EST MAY X REQ PHY/QHP: CPT

## 2020-10-26 ENCOUNTER — HOSPITAL ENCOUNTER (OUTPATIENT)
Dept: PHARMACY | Age: 80
Setting detail: THERAPIES SERIES
Discharge: HOME | End: 2020-10-26
Payer: MEDICARE

## 2020-10-26 ENCOUNTER — TELEPHONE (OUTPATIENT)
Dept: PHARMACY | Age: 80
End: 2020-10-26

## 2020-10-26 VITALS
WEIGHT: 152 LBS | SYSTOLIC BLOOD PRESSURE: 91 MMHG | HEART RATE: 76 BPM | BODY MASS INDEX: 26.93 KG/M2 | DIASTOLIC BLOOD PRESSURE: 57 MMHG | TEMPERATURE: 97.5 F

## 2020-10-26 LAB — INR BLD: 2.2

## 2020-10-26 PROCEDURE — 85610 PROTHROMBIN TIME: CPT

## 2020-10-26 PROCEDURE — 99211 OFF/OP EST MAY X REQ PHY/QHP: CPT

## 2020-10-26 RX ORDER — POTASSIUM CHLORIDE 20 MEQ/1
20 TABLET, EXTENDED RELEASE ORAL DAILY
COMMUNITY
End: 2020-10-27

## 2020-10-26 RX ORDER — LANOLIN ALCOHOL/MO/W.PET/CERES
3 CREAM (GRAM) TOPICAL NIGHTLY PRN
COMMUNITY
End: 2020-11-11

## 2020-10-26 RX ORDER — ACETAMINOPHEN 500 MG
1000 TABLET ORAL EVERY 6 HOURS PRN
COMMUNITY

## 2020-10-26 NOTE — PATIENT INSTRUCTIONS
Continue to monitor urine and stool. Continue to monitor for signs of bleeding. Return to clinic in 2.5 weeks. Continue half tablet for 1.5 mg on Sundays and whole 3 mg tablet all other days.

## 2020-10-27 ENCOUNTER — OFFICE VISIT (OUTPATIENT)
Dept: CARDIOLOGY | Age: 80
End: 2020-10-27
Payer: MEDICARE

## 2020-10-27 VITALS
OXYGEN SATURATION: 99 % | SYSTOLIC BLOOD PRESSURE: 103 MMHG | RESPIRATION RATE: 16 BRPM | HEART RATE: 81 BPM | WEIGHT: 149 LBS | DIASTOLIC BLOOD PRESSURE: 69 MMHG | BODY MASS INDEX: 26.4 KG/M2

## 2020-10-27 PROCEDURE — 99215 OFFICE O/P EST HI 40 MIN: CPT | Performed by: FAMILY MEDICINE

## 2020-10-27 RX ORDER — FUROSEMIDE 20 MG/1
TABLET ORAL
Qty: 90 TABLET | Refills: 3 | Status: SHIPPED | OUTPATIENT
Start: 2020-10-27 | End: 2020-11-11 | Stop reason: ALTCHOICE

## 2020-10-27 RX ORDER — METOPROLOL SUCCINATE 25 MG/1
25 TABLET, EXTENDED RELEASE ORAL DAILY
Qty: 90 TABLET | Refills: 3 | Status: SHIPPED | OUTPATIENT
Start: 2020-10-27 | End: 2020-11-11 | Stop reason: SDUPTHER

## 2020-10-27 RX ORDER — POTASSIUM CHLORIDE 20 MEQ/1
TABLET, EXTENDED RELEASE ORAL
Qty: 30 TABLET | Refills: 0
Start: 2020-10-27 | End: 2020-11-11 | Stop reason: ALTCHOICE

## 2020-10-27 NOTE — PROGRESS NOTES
Modesto Salazar RN am scribing for and in the presence of Vamsi Myers. Renato MARTIN, MS, F.A.C.C. Patient: Hal Blankenship  : 1940  Date of Visit: 2020    REASON FOR VISIT / CONSULTATION: Follow-up (Pt weight at last visit was 152 lb and todays weight is 149 lbs. reports staying active however she does notice a little more SOB today with just talking. Denies CP, lightheadedness or dizziness. Rare palpitations. )      Dear Alex Boyce MD,    I had the pleasure of seeing Hal Blankenship today. Ms. Kenna Llamas is a [de-identified] y.o. female with a history of paroxysmal atrial fibrillation. She reports that this 1st occurred a few year ago and most recently occurred first part of February  but she says she has always had some degree of palpitations. Her last previous sustained event was about a year ago. She also has a history of mitral valve prolapse but denies any heart attacks, other cardiac issues or stroke or mini stroke. She underwent a cardiovascular stress test on 2018 that had show to be consistent with low to intermediate risk of coronary artery disease. Her cardiac catheterization on 2018 had shown mild irregularities with the worst being in her LAD at 20-30%. She was also admitted to the hospital for tikosyn loading on 2018 and has been maintained on this ever since with good results. She had an Echo done on 2020 that showed the left atrium that is severely dilated (>40) with a left atrial volume index of 76 ml/m2. Bileaflet prolapse with more prominent P2 prolapse and moderate to severe mitral regurgitation. Moderate tricuspid regurgitation. Mild pulmonary hypertension with estimated pulmonary artery systolic pressure of 33 mmHg. Moderate diastolic dysfunction. Her EF was 60%. Mitral valve replacement done at [facility] using 29 mm Epic Bioprosthetic valve on 10/06/2020. MAZE procedure done on 10/06/2020. Ms. Springer reports doing fairly well since last visit. She says that her shortness of breath has gotten better but says she does get tired at times the past couple of days. She denied any chest pain or discomfort, lightheadedness/dizziness, abdominal pain, bleeding problems, problems with her medications or any other concerns at this time. She denies any known repeat episodes of atrial fibrillation. She denies any lightheadedness or dizziness. Ms. Celi Lakhani reports that she sleeps ok at night time but does sometimes have to reposition herself to recliner and bed due to not being able to sleep on her side. Decreased three pounds since last visit   Wt Readings from Last 3 Encounters:   10/27/20 149 lb (67.6 kg)   10/26/20 152 lb (68.9 kg)   10/19/20 154 lb (69.9 kg)       Past Medical History:   Diagnosis Date    Cancer [facility])     Essential hypertension     Fracture of sacrum ([address] 2011    GERD (gastroesophageal reflux disease)     H/O cardiovascular stress test 02/28/2018    Equivocal myocardial perfusion study. There is a small/moderate perufsion defect of mild intensity in the apical an anteroapical regions during stress imaging which is most consistent with artifact but may be due to a small degree of coronary ischemia. Overall, these results are most consistent with a low/intermediate risk for CAD.     H/O echocardiogram 02/02/2018    EF 60%. Mildly increased left ventricular wall thickness. No definite specific wall motion abnormalities. Bi-atrial enlargment. Myxomatous thickening of the mitral leaflets with bileaflet prolapse and moderat mitral regurg. Moderate tricuspid regurg. Mild pulmonary HTN with an estimated right ventricualr systolic pressure [id number].      History of Holter monitoring 2016    PACs & PVCs    Irritable bowel syndrome     Mitral valve prolapse     Osteoarthritis     Osteopenia     Paroxysmal atrial fibrillation (HCC) 2018    Rosacea     Tendinitis of left shoulder        CURRENT ALLERGIES: Butalbital-aspirin-caffeine and Other REVIEW OF SYSTEMS: 14 systems were reviewed. Pertinent positives and negatives as above, all else negative.      Past Surgical History:   Procedure Laterality Date    BUNIONECTOMY      CARDIAC CATHETERIZATION Left 07/02/2020    DR Gross/[facility] [place]/right radial-    COLONOSCOPY  2008    CYSTOCELE REPAIR      ENDOSCOPY, COLON, DIAGNOSTIC      EYE SURGERY      HERNIA REPAIR      HYSTERECTOMY      MOHS SURGERY  2008    SKIN BIOPSY      LUBA AND BSO Bilateral 2014    TRANSESOPHAGEAL ECHOCARDIOGRAM  07/02/2020    Dr Janell Diamond [place]/Unsuccessful attempt    TRANSESOPHAGEAL ECHOCARDIOGRAM  07/09/2020    Dr Bina Espinoza [place]/Failed, anesthesia sedate    UPPER GASTROINTESTINAL ENDOSCOPY N/A 12/10/2019    -antral erythema,bx(mild chronic inactive gastritis with focal intestinal metaplasia,neg H-Pylori)dilation    UPPER GASTROINTESTINAL ENDOSCOPY  12/10/2019    EGD DILATION SAVORY performed by Johnathan Chiu MD at [place] [address]  2003    [facility]    VASCULAR SURGERY      VEIN SURGERY      Social History:  Social History     Tobacco Use    Smoking status: Never Smoker    Smokeless tobacco: Never Used   Substance Use Topics    Alcohol use: Never     Frequency: Never     Drinks per session: Patient refused     Binge frequency: Never     Comment: occasionally    Drug use: No        CURRENT MEDICATIONS:  Outpatient Medications Marked as Taking for the 10/27/20 encounter (Office Visit) with Hannah Mesa MD   Medication Sig Dispense Refill    melatonin 3 MG TABS tablet Take 3 mg by mouth nightly as needed      potassium chloride (KLOR-CON M) 20 MEQ extended release tablet Take 20 mEq by mouth daily      acetaminophen (TYLENOL) 500 MG tablet Take 1,000 mg by mouth every 6 hours as needed for Pain      furosemide (LASIX) 40 MG tablet Take 40 mg by mouth daily 10/22/20-started lasix 40 mg for two weeks      metoprolol tartrate (LOPRESSOR) 25 MG tablet Take 12.5 mg by mouth 2 times daily      warfarin (COUMADIN) 3 MG tablet Take 3 mg by mouth daily As directed by [facility]   Half tablet for 1.5 mg on Sundays and whole 3 mg tablet all other days.  polyethylene glycol (GLYCOLAX) 17 g packet Take 17 g by mouth daily as needed for Constipation      omeprazole (PRILOSEC) 40 MG delayed release capsule Take 1 capsule by mouth daily 90 capsule 2    aspirin EC 81 MG EC tablet Take 1 tablet by mouth daily 90 tablet 3    vitamin D (CHOLECALCIFEROL) 1000 UNIT TABS tablet Take 1,000 Units by mouth nightly       docusate sodium (COLACE) 100 MG capsule Take 100 mg by mouth daily       Magnesium 400 MG TABS Take 1 tablet by mouth nightly          FAMILY HISTORY: family history includes Arthritis in her sister and sister; Atrial Fibrillation in her sister; Diabetes in her brother and mother; Heart Attack in her brother; Heart Disease in her mother; High Blood Pressure in her father. PHYSICAL EXAM:   /69 (Site: Left Upper Arm, Position: Sitting, Cuff Size: Medium Adult)   Pulse 81   Resp 16   Wt 149 lb (67.6 kg)   SpO2 99%   BMI 26.40 kg/m²  Body mass index is 26.4 kg/m². Constitutional: She is oriented to person, place, and time. She appears well-developed and well-nourished. In no acute distress. HEENT: Normocephalic and atraumatic. No JVD present. Carotid bruit is not present. No mass and no thyromegaly present. No lymphadenopathy present. Cardiovascular: Normal rate, regular rhythm, normal heart sounds. Exam reveals no gallop and no friction rubs. 3/6 systolic murmur, 2nd intercostal space on the RIGHT just lateral to the sternum. Pulmonary/Chest: Effort normal and breath sounds normal. No respiratory distress. She has no wheezes, rhonchi or rales. Abdominal: Soft, non-tender. Bowel sounds and aorta are normal. She exhibits no organomegaly, mass or bruit. Redness to surgical site. Extremities:  No edema. No cyanosis and no clubbing.  Pulses are 2+ radial and carotid pulses. 2+ dorsalis pedis and posterior tibial pulses bilaterally. Neurological: She is alert and oriented to person. No evidence of gross cranial nerve deficit. Coordination appeared normal.   Skin: Skin is warm and dry. There is no rash or diaphoresis. Psychiatric: She has a normal mood and affect. Her speech is normal and behavior is normal.      MOST RECENT LABS ON RECORD:   Lab Results   Component Value Date    WBC 5.3 10/15/2020    HGB 8.2 (L) 10/15/2020    HCT 26.4 (L) 10/15/2020     10/15/2020    CHOL 180 05/11/2019    TRIG 39 05/11/2019    HDL 67 05/11/2019    ALT 20 12/07/2018    AST 26 12/07/2018     10/15/2020    K 4.0 10/15/2020     10/15/2020    CREATININE 0.68 10/15/2020    BUN 13 10/15/2020    CO2 25 10/15/2020    TSH 2.46 04/24/2018    INR 2.2 10/26/2020    LABA1C 5.7 12/07/2018       ASSESSMENT:  1. Severe mitral regurgitation    2. H/O mitral valve replacement    3. PAF (paroxysmal atrial fibrillation) ([facility] [address]    4. Chronic diastolic heart failure (HCC)       PLAN:    · History of Severe Mitral Regurgitation: s/p mitral valve replacement done on 10/6/2020, with 29 mm Epic valve (Bioprosthetic)  · Beta Blocker Therapy: Continue Metoprolol tartrate (Lopressor)  12.5 mg  twice daily. · Diuretics: DECREASE to furosemide (Lasix) 20 mg every morning for 1 week then decrease to 20 mg every other day for 1 week then completely stop. However if she were to develop increase shortness of breath or weight gain to make sure she call sooner than her next appointment. · Decrease KCL to 10 meq once daily for 1 week, then 10 meq every other day for 1 week and then completely stop. · Referred for Phase II cardiac rehab at last visit   · [facility] two weeks from now to assess kidney function and potassium. Paroxysmal Atrial Fibrillation: Rhythm Control  S/p MAZE procedure on 10/6/2020.  We had a long discussion about long term anticoagulation, warfarin vs Eliquis 3 months after her procedure. I told her guidelines would still recommend she remain on full anticoagulation long term but we will discuss this further 3 months after procedure. Beta Blocker Therapy: Continue Metoprolol tartrate (Lopressor)  12.5 mg  twice daily    Rhythm Control Agent: Not indicated  Stroke Risk: Her CHADS2-VASc score is 4/9 (4% stroke risk)   [id number] Score for Atrial Fibrillation Stroke Risk   Risk   Factors  Component Value   C CHF No 0   H HTN Yes 1   A2 Age >= 76 Yes,  [de-identified] y.o.) 2   D DM No 0   S2 Prior Stroke/TIA No 0   V Vascular Disease No 0   A Age 74-69 No,  [de-identified] y.o.) 0   Sc Sex female 1    [id number]  Score  4   Score last updated 6/31/20 4:83 AM EDT  Click here for a link to the UpToDate guideline \"Atrial Fibrillation: Anticoagulation therapy to prevent embolization  Disclaimer: Risk Score calculation is dependent on accuracy of patient problem list and past encounter diagnosis. Anticoagulation: Continue Warfarin. Goal INR 2-3. I also reminded her to watch for signs of blood in her stool or black tarry stools and stop the medication immediately if this develops as this could be life threatening.  Chronic diastolic heart failure: New York Heart Association Class: IIa (Mild symptoms only in normal activities)   Beta Blocker: Continue Metoprolol tartrate (Lopressor) 25 mg 1/2 tab bid.  ACE Inibitor/ARB: Not indicated at this time.  Diuretics: DECREASE to furosemide (Lasix) 20 mg every morning for 1 week then decrease to 20 mg every other day for 1 week then completely stop assuming no significant increased  lower extremity edema or shortness of breath.  Heart failure counseling: I told her to continue wearing lower extremity compression stockings and I advised them to try and keep their legs up whenever possible and to limit salt in their diet. Finally, I recommended that she continue her other medications and follow up with you as previously scheduled.      FOLLOW UP:   I told Ms. Springer to call my office if she had any problems, but otherwise I asked her to Return in about 2 months (around 12/27/2020). However, I would be happy to see her sooner should the need arise. Sincerely,  Faraz Ruiz. Renato MARTIN, MS, F.A.C.C. [facility] Specialist    [address] [place] [address], [address] [place] [address]  Phone: [phone], Fax: [phone]     I believe that the risk of significant morbidity and mortality related to the patient's current medical conditions are: intermediate-high. 40 minutes were spent with the patient and all of their questions were answered. The documentation recorded by the scribe, accurately and completely reflects the services I personally performed and the decisions made by me. Lorna Wilde MD, MS, F.A.C.C.  October 27, 2020

## 2020-10-30 ENCOUNTER — HOSPITAL ENCOUNTER (OUTPATIENT)
Dept: CARDIAC REHAB | Age: 80
Setting detail: THERAPIES SERIES
Discharge: HOME | End: 2020-10-30
Payer: MEDICARE

## 2020-10-30 VITALS
HEART RATE: 80 BPM | OXYGEN SATURATION: 96 % | HEIGHT: 64 IN | RESPIRATION RATE: 16 BRPM | DIASTOLIC BLOOD PRESSURE: 66 MMHG | SYSTOLIC BLOOD PRESSURE: 98 MMHG | BODY MASS INDEX: 24.75 KG/M2 | WEIGHT: 145 LBS

## 2020-10-30 ASSESSMENT — PATIENT HEALTH QUESTIONNAIRE - PHQ9
SUM OF ALL RESPONSES TO PHQ QUESTIONS 1-9: 6
SUM OF ALL RESPONSES TO PHQ QUESTIONS 1-9: 6

## 2020-10-30 NOTE — PROGRESS NOTES
Cardiac Rehab Initial History and Assessment    Juliet Adams 1940  10/30/2020    Primary Diagnosis: S/P mitral valve replacement  Living Will: Yes   On File: Yes  Durable Power of :Yes    Medical History  Past Medical History:   Diagnosis Date    Cancer ([address]     CHF (congestive heart failure) ([address]     Essential hypertension     Fracture of sacrum ([address] 2011    GERD (gastroesophageal reflux disease)     H/O cardiovascular stress test 02/28/2018    Equivocal myocardial perfusion study. There is a small/moderate perufsion defect of mild intensity in the apical an anteroapical regions during stress imaging which is most consistent with artifact but may be due to a small degree of coronary ischemia. Overall, these results are most consistent with a low/intermediate risk for CAD.     H/O echocardiogram 02/02/2018    EF 60%. Mildly increased left ventricular wall thickness. No definite specific wall motion abnormalities. Bi-atrial enlargment. Myxomatous thickening of the mitral leaflets with bileaflet prolapse and moderat mitral regurg. Moderate tricuspid regurg. Mild pulmonary HTN with an estimated right ventricualr systolic pressure [id number].      History of Holter monitoring 2016    PACs & PVCs    Irritable bowel syndrome     Mitral valve prolapse     Osteoarthritis     Osteopenia     Paroxysmal atrial fibrillation (HCC) 2018    Rosacea     Tendinitis of left shoulder      Past Surgical History:   Procedure Laterality Date    BUNIONECTOMY      CARDIAC CATHETERIZATION Left 07/02/2020    DR [facility]/right radial-    COLONOSCOPY  2008    CYSTOCELE REPAIR      ENDOSCOPY, COLON, DIAGNOSTIC      EYE SURGERY      HERNIA REPAIR      HYSTERECTOMY      MOHS SURGERY  2008    SKIN BIOPSY      LUBA AND BSO Bilateral 2014    TRANSESOPHAGEAL ECHOCARDIOGRAM  07/02/2020    Dr Earle Telles/Unsuccessful attempt    TRANSESOPHAGEAL ECHOCARDIOGRAM  07/09/2020     N/A    Diabetes    [] Yes  [x] No  How long:   Latest BS:   Frequency of Checks:  Medication:     Stress    Source: Heart surgery  Relaxation techniques: Read, watch tv, do puzzles  Hobbies: Gardening    Level of Education    [] 8th Grade  [] Associates  [] Masters  [] High School [] Bachelor  [x]  Other: Diploma in nursing    Depression Screening:  [x] Have you been feeling sad. ..down in the dumps? [] Have you lost interest in your job, sports, hobbies, friends? [x] Do you often feel tired? [x] Do you have trouble sleeping or do you sleep too much? [] Have you been gaining or losing weight? [] Do you often feel down on yourself, that everything is your fault? [] Do you have troubled making decisions or concentrating on your work? [] Do you often feel agitated or like you can barely move? [] Do you ever feel that life isn't worth living?    *If greater than 5 symptoms listed,  notified. Cardiac Rehab Pre - Test  1. The heart is a muscle that acts like a pump to deliver oxygen and blood to the rest of the body. [x] True   [] False  2. Healing from the damage of a heart attach is complete in two weeks. [] True   [x] False  3. Smoking has no direct effect on the heart - it only effects your lungs. [] True   [x] False  4. Using all the salt you want is acceptable for all heart patients. [] True   [x] False  5. Chest pain that is relieved by rest or nitroglycerine is called Angina. [x] True   [] False  6. Shortness of breath, indigestion, sweating, tightness or pain in your chest are symptoms of a heart attack. [x] True   [] False  7. Swelling of the feet and ankles only means you've been on your feet too much. [] True   [x] False  8. Saturated fats raised your blood cholesterol level more than anything else in your diet. [x] True   [] False  9. High Blood pressure can take care of itself by rest alone. [] True   [x] False  10.  Walking is one of the best exercises for heart attack patients. [x] True   [] False    Physical Findings   BP: 98/66   Pulse: 80   Resp: 16   SpO2: 96 %  Alert and oriented to person, place and time. Skin warm, dry and pale. Respirations regular and unlabored. Lungs clear throughout. Heart tones distant and regular. Midline chest incision healed. Abdominal drain incisions slightly reddened and scabbed-no drainage noted. States surgeon has assessed same. No edema noted.   Goals:   -increased stamina/strength to 30-50 total exercise by increasing 1-2 level/wk and 1-2   min/wk  to achieve THR and RPE 11-13  -introduce weights/ therabands 2-4# for 5-10 reps  -manage BP better  -improved cholesterol and  Triglycerides  -develop regular exercise 30 min daily

## 2020-11-02 ENCOUNTER — HOSPITAL ENCOUNTER (OUTPATIENT)
Dept: CARDIAC REHAB | Age: 80
Setting detail: THERAPIES SERIES
Discharge: HOME | End: 2020-11-02
Payer: MEDICARE

## 2020-11-02 VITALS — HEIGHT: 64 IN | WEIGHT: 148.6 LBS | BODY MASS INDEX: 25.37 KG/M2

## 2020-11-02 PROCEDURE — 93798 PHYS/QHP OP CAR RHAB W/ECG: CPT

## 2020-11-02 NOTE — PROGRESS NOTES
Phase II Cardiac Rehab Individualized Treatment Plan-Initial     Patient Name: Epi Hollis  Date of Initial Assessment: 11/2/2020  ACCOUNT #: [de-identified]  Diagnosis: Mitral valve replacement   Onset Date: 10/6/2020  Referring Physician: Dr Saad Ramírez Stratification: Moderate  Session Number: 1  EXERCISE    Stages of Change:   [] pre-contemplation   [x] Action   [] Contemplate   [] Maintainence   [x] Prep   [] Relapse          Exercise Prescription:  Mode: [x] TM [x] B [x] STP [] EL [x] R  Frequency: 3X/week  Duration: 31-60 minutes  Intensity: METS 2.2  Progression: Increase 1-2 levels/week or 1-2 min/week to achieve target HR and RPE 11-13. [] Angina with Exertion THR:    [] Resistance Training Weight (lbs):   Reps:     Hypertension:  [x] Yes  [] No  Resting BP: 108/68  Peak Exercise BP: 100/62  BP Meds: Metoprolol    Intervention:  Home Exercise:  Type: Walking  Duration: 30 minutes  Frequency: 3X/week   [] Resistance Training    Education:   [x] Equipment [place]  [x] Self pulse   [] Proper use weights/therabands   [x] S/S to report  [x] Low Na Diet    [x] Warm up/ Cool down  [] BP Medication    [x] RPE Scale   [] Understand BP   [] Ex Safety   [] Exercise specialist class-Home Exercise       Target Goal:   -Individual Exercise Plan  -BP<140/90 or <130/80 if DM   -Aerobic active 30 + minutes 5-7 days per week    Nutrition    Stages of Change:   [] pre-contemplation   [x] Action   [] Contemplate   [] Maintainence   [x] Prep   [] Relapse    Lipids:    Total Cholesterol: 180  Triglycerides: 39  HDL: 67  LDL: 105.2  Lipid Meds: None     Diabetes:  [] Yes  [x] No  FBS:   HbA1c:  Diabetes Medication:  [] Monitor BS at home   Frequency?:     Weight Management:  Last Weight: 148.6lbs  Height: 5'4\"  BMI:   Wt Goal: 1-2 lbs/wk  Alcohol:   Social History     Substance and Sexual Activity   Alcohol Use Never    Frequency: Never    Drinks per session: Patient refused    Binge frequency: Never    Comment: occasionally     Diet Assessment Tool: Food Diary  Special Diet: Low fat low sodium low cholesterol    Intervention:   [] Dietitian Consult       [x] Nurse/Patient Discussion     [] Diet Class           [] Referred to Diabetes Education     Education:  [] S&S hypo/hyperglycemia  [] Low fat/low cholesterol diet  [] Weight loss methods      [] Relate Diabetes/CAD     [] Eating heart healthy handout    Target Goal:  -LDL-C<100 if triglycerides are > 200  -LDL-C < 70 for high risk patients  -HbA1c < 7%  -BMI < 25   Education    Stages of Change:    [] pre-contemplation   [x] Action   [] Contemplate   [] Maintainence   [x] Prep   [] Relapse    Learning Barriers:   [] Speech   [] Cognitive   [] Literacy   [] Visions   [] Hearing    [x] Ready Learn    Knowledge test score: 100%      Family support: [x] Yes  [] No    Tobacco use:   Social History     Tobacco Use   Smoking Status Never Smoker   Smokeless Tobacco Never Used       Intervention:  [] Referred to smoking cessation counselor     [] Individual education and counseling  [] Tobacco adjunct  [] Informed of education class schedule     Education:   [] Risk Factors/Modifications  [x] Psychological aspects  [] Angina    [] Medications  [x] CHF      [] Cardiac A&P    Target Goal:  -Complete cessation of tobacco use (if applicable)  -Continued risk factor modifications  -Recognizing signs/symptoms to report  -Proper use of meds    Psychosocial  Stages of Change:    [] pre-contemplation   [x] Action   [] Contemplate   [] Maintainence   [x] Prep   [] Relapse    Psychosocial Test:  Tool Used: Frankie Yost Quality of Life  Score: 21.71  Depression screening score: 3/9    Intervention:   [] Psych Consult/  [x] Uses stress management skills    [] Physician Referral    [] Stress management class  Medications:     Education:    [] Coping Techniques   [] Relaxation techniques   [] S/S of Depression    Target Goal:  -Assess presence or absence of depression using a valid screening tool. -Maximize coping skills.  -Positive support system. Preventative Medication:   [x] Aspirin       [x] Beta Blockade      [] Statin or other lipid lowering agent     [] Clopidogrel   [] ACE Inhibitor   [x] Other anticoagulation medications     Fall Risk assess: [x] Yes  [] No  Assistive Device:   [] Cane  [] Walker [] Wheel Chair  [] Gait belt    Patient/Program goal:   increased stamina/strength to 30-50 total exercise by increasing 1-2 level/wk and 1-2   min/wk  to achieve THR and RPE 11-13 Initial METS of 2.2 achieved. -introduce weights/ therabands 2-4# for 5-10 reps Will introduce at future session.  -manage BP better Initial /68 and 100/62 today.  -improved cholesterol and  Triglycerides Results from 5/11/19 reviewed and WNL. Heart healthy diet info provided. Rate Your Plate and food diary completed. Will schedule dietician consult, if desired.  -develop regular exercise 30 min daily Prior to [employer], exercised regularly at [facility].  Will encourage to gradually increase daily walking to 30 minutes as tolerated.        Physician Changes/Comments:      Cardiac Rehab Staff

## 2020-11-02 NOTE — PROGRESS NOTES
Cardiac Rehabilitation   Physician Order Form    Rl Diallomarisel  1940    [x] Phase 2 ECG Monitored Cardiac Rehabilitation    [] MI   [] PTCA with/without stents  [] CABG  [x] Heart Valve Repair/Replaced   [] Stable Angina [] Other:     Onset Date: 10/6/2020                    Cardiac Education Goals: (see individualized treatment plan for specific goals, progression & compliance)    [x] Hypertension [] Physical Inactivity  [x] A & P  [] Smoking  [x] Coping/Depression  [x] Medications  [] Diabetes  [] Obesity   [x] Angina  [] Hyperlipidemia [x] Stress   [x] Home Exercise                     Prescribed Exercise Plan:    Target HR:       Duration:31-60 minutes  Frequency:3X/week  Initial Met Level:2.2  Limitations:    Modalities:  [x]Treadmill   [x] [place]. Stepper/bike  [] Elliptical  [x] [employer]  [x] Weights/therabands    · Aerobic exercise to total 31-60 minutes. Progressing by 1-2 minutes per week and/or 1-2 levels per week per patient tolerance using various modalities; according to Rachel Scale 11-13 and THR  · Strength training starting with weights 1-3 # / 5-8 reps progressing to 5-10 # / 15-20 reps; therabands red-gray 5-20 reps. Per patient symptoms use:  · Appropriate ACLS Algorhythm for Cardiac Events. · Nitroglycerine 0.4mg SLq 5mins X 3 for angina pain. · 12 lead EKG for c/o chest pain or change in rhythm. · Nasal O2 for SaO2 <90% or symptoms warranted. · Blood sugar monitoring for Hyper/Hypoglycemia symptoms.

## 2020-11-04 ENCOUNTER — TELEPHONE (OUTPATIENT)
Dept: CARDIOLOGY | Age: 80
End: 2020-11-04

## 2020-11-04 NOTE — TELEPHONE ENCOUNTER
Pt has an order for BMP and would leo to know if you will also order a CBC to recheck her Hemoglobin?

## 2020-11-04 NOTE — PROGRESS NOTES
Cardiopulmonary Rehab   Medical Nutrition Therapy Food Diary Evaluation    Patient Name: García Duncan  Registered Dietitian:  Paolo Zarco RDN, LD  Date:  11/4/2020    Dear Kingsley Mann,    Thank you for sharing your information about your eating patterns, it serves not only to help me see what you are eating, but you as well. Eating 3 meals a day is great way to start, I am pleased to see that you are doing that. Whole grains, fruits and vegetables, along with lean meats and low fat dairy are the cornerstones of your health. It is good to see that you are incorporating fruits, vegetables, and whole grains into your meal plans. How exciting to see that you are walking in your house/garage and utilizing the space you have to include physical activity. It is good to see that you are drinking water 24-32 oz each day. With that in mind, look below for some suggestions. Your Rate Your Plate (RYP) Score was: 53, which means: There are some ways you can make your eating habits healthier    Recommendations from the Dietitian based on your RYP and Food Diary / activity record to improve health and decrease risk factors    1. Continue to have 3 well balanced meals with a variety of lean meats, fresh fruits, vegetables, whole grains and low fat dairy/almond milk. 2. Gradually increase water to 48 oz each day. 3. Aim for a minimum of 5 servings of fruit and vegetables (non-starchy vegetables: broccoli, cauliflower, carrots, etc. each day. 4. Increase physical activity to a minimum of 30 minutes, 5 days each week as tolerated. If there are many things to change, try making change with one recommendation, then move on from there. Recommendations are based on guidelines from the American Heart Association, American Diabetes Association and current literature.     Feel free to call with questions or concerns [phone] or [address] [place], [place]

## 2020-11-05 ENCOUNTER — HOSPITAL ENCOUNTER (OUTPATIENT)
Dept: CARDIAC REHAB | Age: 80
Setting detail: THERAPIES SERIES
Discharge: HOME | End: 2020-11-05
Payer: MEDICARE

## 2020-11-05 PROCEDURE — 93798 PHYS/QHP OP CAR RHAB W/ECG: CPT

## 2020-11-09 ENCOUNTER — HOSPITAL ENCOUNTER (OUTPATIENT)
Dept: CARDIAC REHAB | Age: 80
Setting detail: THERAPIES SERIES
Discharge: HOME | End: 2020-11-09
Payer: MEDICARE

## 2020-11-09 PROCEDURE — 93798 PHYS/QHP OP CAR RHAB W/ECG: CPT

## 2020-11-10 ENCOUNTER — HOSPITAL ENCOUNTER (OUTPATIENT)
Age: 80
Discharge: HOME | End: 2020-11-10
Payer: MEDICARE

## 2020-11-10 ENCOUNTER — TELEPHONE (OUTPATIENT)
Dept: PHARMACY | Age: 80
End: 2020-11-10

## 2020-11-10 LAB
ANION GAP SERPL CALCULATED.3IONS-SCNC: 7 MMOL/L (ref 9–17)
BUN BLDV-MCNC: 12 MG/DL (ref 8–23)
BUN/CREAT BLD: 20 (ref 9–20)
CALCIUM SERPL-MCNC: 9.2 MG/DL (ref 8.6–10.4)
CHLORIDE BLD-SCNC: 106 MMOL/L (ref 98–107)
CO2: 27 MMOL/L (ref 20–31)
CREAT SERPL-MCNC: 0.59 MG/DL (ref 0.5–0.9)
GFR AFRICAN AMERICAN: >60 ML/MIN
GFR NON-AFRICAN AMERICAN: >60 ML/MIN
GFR SERPL CREATININE-BSD FRML MDRD: ABNORMAL ML/MIN/{1.73_M2}
GFR SERPL CREATININE-BSD FRML MDRD: ABNORMAL ML/MIN/{1.73_M2}
GLUCOSE BLD-MCNC: 92 MG/DL (ref 70–99)
HCT VFR BLD CALC: 35.9 % (ref 36.3–47.1)
HEMOGLOBIN: 11.2 G/DL (ref 11.9–15.1)
MCH RBC QN AUTO: 32.4 PG (ref 25.2–33.5)
MCHC RBC AUTO-ENTMCNC: 31.2 G/DL (ref 28.4–34.8)
MCV RBC AUTO: 103.8 FL (ref 82.6–102.9)
NRBC AUTOMATED: 0 PER 100 WBC
PDW BLD-RTO: 15.7 % (ref 11.8–14.4)
PLATELET # BLD: 227 K/UL (ref 138–453)
PMV BLD AUTO: 8.8 FL (ref 8.1–13.5)
POTASSIUM SERPL-SCNC: 4.3 MMOL/L (ref 3.7–5.3)
RBC # BLD: 3.46 M/UL (ref 3.95–5.11)
SODIUM BLD-SCNC: 140 MMOL/L (ref 135–144)
WBC # BLD: 4.9 K/UL (ref 3.5–11.3)

## 2020-11-10 PROCEDURE — 80048 BASIC METABOLIC PNL TOTAL CA: CPT

## 2020-11-10 PROCEDURE — 85027 COMPLETE CBC AUTOMATED: CPT

## 2020-11-10 PROCEDURE — 36415 COLL VENOUS BLD VENIPUNCTURE: CPT

## 2020-11-10 NOTE — TELEPHONE ENCOUNTER
Recent Travel Screening and Travel History documentation:     Travel Screening     Question   Response    In the last month, have you been in contact with someone who was confirmed or suspected to have Coronavirus / COVID-19? No / Unsure    Have you had a COVID-19 viral test in the last 14 days? No    Do you have any of the following new or worsening symptoms? None of these    Have you traveled internationally in the last month? No      Travel History   Travel since 10/10/20     No documented travel since 10/10/20         Patient denies S/S covid/travel screen. Provided instruction for curbside INR check/appiontment. Jl states understanding. Told to come inside to clinic for apt. She said she has Dr. Reed Joseph at 11:45am. So apt needs to be \"quick\".

## 2020-11-11 ENCOUNTER — HOSPITAL ENCOUNTER (OUTPATIENT)
Dept: PHARMACY | Age: 80
Setting detail: THERAPIES SERIES
Discharge: HOME | End: 2020-11-11
Payer: MEDICARE

## 2020-11-11 ENCOUNTER — TELEPHONE (OUTPATIENT)
Dept: CARDIOLOGY | Age: 80
End: 2020-11-11

## 2020-11-11 VITALS
DIASTOLIC BLOOD PRESSURE: 70 MMHG | TEMPERATURE: 97.8 F | SYSTOLIC BLOOD PRESSURE: 110 MMHG | BODY MASS INDEX: 25.06 KG/M2 | HEART RATE: 77 BPM | WEIGHT: 146 LBS

## 2020-11-11 PROBLEM — I20.9 ANGINA, CLASS IV (HCC): Status: RESOLVED | Noted: 2018-04-24 | Resolved: 2020-11-11

## 2020-11-11 LAB — INR BLD: 1.7

## 2020-11-11 PROCEDURE — 85610 PROTHROMBIN TIME: CPT

## 2020-11-11 PROCEDURE — 99212 OFFICE O/P EST SF 10 MIN: CPT

## 2020-11-11 NOTE — PROGRESS NOTES
[facility]  Medication Management  ANTICOAGULATION    Referring Doctor: Pam Cassidy INR: 2-3    TODAY'S INR: 1.7     WARFARIN Dosage: Patient will take 1.5 tablets for 4.5 mg today then increase warfarin to 3 mg daily. INR (no units)   Date Value   10/26/2020 2.2   10/19/2020 2.2   10/15/2020 3.1       Hemoglobin (g/dL)   Date Value   11/10/2020 11.2 (L)   10/15/2020 8.2 (L)   06/29/2020 12.7     Hematocrit (%)   Date Value   11/10/2020 35.9 (L)   10/15/2020 26.4 (L)   06/29/2020 39.6     Platelets (k/uL)   Date Value   11/10/2020 227   10/15/2020 170   06/29/2020 166     Significant Drug-Drug Interactions: On Augmentin x 10 days started 11-9  Stopped Lasix and Potassium  Started Refresh eye drops    Notes:    Fingerstick INR drawn per clinic protocol. Patient states no visible blood in urine and no black tarry stool. Denies any missed doses of warfarin. No change in other maintenance medications or in diet. Will recheck INR in 2 weeks. Patient acknowledges working in consult agreement with pharmacist as referred by his/her physician. Patient also has diarrhea (usually increases INR) probably from the Augmentin. CLINICAL PHARMACY CONSULT: MED RECONCILIATION/REVIEW ADDENDUM    For Pharmacy Admin Tracking Only    PHSO: Yes  Total # of Interventions Recommended: 6  - Increased Dose #: 2  - Discontinued Medication #: 1 Discontinue Reason(s): No Longer Used  - New Order #: 2 New Medication Order Reason(s): Needs Additional Medication Therapy  - Maintenance Safety Lab Monitoring #: 1  Recommended intervention potential cost savings:   Accepted intervention potential cost savings:    Total Interventions Accepted: 7  Time Spent (min): 30    Bhavya Clay PharmD

## 2020-11-12 ENCOUNTER — HOSPITAL ENCOUNTER (OUTPATIENT)
Dept: CARDIAC REHAB | Age: 80
Setting detail: THERAPIES SERIES
Discharge: HOME | End: 2020-11-12
Payer: MEDICARE

## 2020-11-12 PROCEDURE — 93798 PHYS/QHP OP CAR RHAB W/ECG: CPT

## 2020-11-19 ENCOUNTER — HOSPITAL ENCOUNTER (OUTPATIENT)
Dept: CARDIAC REHAB | Age: 80
Setting detail: THERAPIES SERIES
Discharge: HOME | End: 2020-11-19
Payer: MEDICARE

## 2020-11-19 PROCEDURE — 93798 PHYS/QHP OP CAR RHAB W/ECG: CPT

## 2020-11-20 ENCOUNTER — TELEPHONE (OUTPATIENT)
Dept: PHARMACY | Age: 80
End: 2020-11-20

## 2020-11-23 ENCOUNTER — HOSPITAL ENCOUNTER (OUTPATIENT)
Dept: CARDIAC REHAB | Age: 80
Setting detail: THERAPIES SERIES
Discharge: HOME | End: 2020-11-23
Payer: MEDICARE

## 2020-11-23 ENCOUNTER — HOSPITAL ENCOUNTER (OUTPATIENT)
Dept: PHARMACY | Age: 80
Setting detail: THERAPIES SERIES
Discharge: HOME | End: 2020-11-23
Payer: MEDICARE

## 2020-11-23 VITALS
WEIGHT: 149 LBS | BODY MASS INDEX: 25.58 KG/M2 | HEART RATE: 69 BPM | TEMPERATURE: 97.6 F | DIASTOLIC BLOOD PRESSURE: 64 MMHG | SYSTOLIC BLOOD PRESSURE: 104 MMHG

## 2020-11-23 LAB — INR BLD: 1.5

## 2020-11-23 PROCEDURE — 93798 PHYS/QHP OP CAR RHAB W/ECG: CPT

## 2020-11-23 PROCEDURE — 85610 PROTHROMBIN TIME: CPT

## 2020-11-23 PROCEDURE — 99212 OFFICE O/P EST SF 10 MIN: CPT

## 2020-11-23 NOTE — PATIENT INSTRUCTIONS
Continue to monitor urine and stool. Continue to monitor for signs of bleeding. Return to clinic in 2 weeks. Take warfarin 2 tablets for 6 mg today then continue 3 mg tablet daily.

## 2020-11-23 NOTE — PROGRESS NOTES
[facility]  [facility]ICO[facility]ULATION    Referring Doctor: Pj Meraz INR: 2-3    TODAY'S INR: 1.5    WARFARIN Dosage: Take warfarin 2 tablets for 6 mg today then continue 3 mg tablet daily. INR (no units)   Date Value   11/23/2020 1.5   11/11/2020 1.7   10/26/2020 2.2   10/19/2020 2.2   10/15/2020 3.1       Hemoglobin (g/dL)   Date Value   11/10/2020 11.2 (L)   10/15/2020 8.2 (L)   06/29/2020 12.7     Hematocrit (%)   Date Value   11/10/2020 35.9 (L)   10/15/2020 26.4 (L)   06/29/2020 39.6     Platelets (k/uL)   Date Value   11/10/2020 227   10/15/2020 170   06/29/2020 166       Significant Drug-Drug Interactions:  Patient has only been taking half tablet (1.5 mg)  of warfarin on Sundays. Completed Amoxicillin therapy    Notes:    Fingerstick INR drawn per clinic protocol. Patient states no visible blood in urine and no black tarry stool. Denies any missed doses of warfarin. No change in other maintenance medications or in diet. Will recheck INR in 2 weeks. Patient acknowledges working in consult agreement with pharmacist as referred by his/her physician. Encouraged patient to use a pill organizer. CLINICAL PHARMACY CONSULT: MED RECONCILIATION/REVIEW ADDENDUM    For Pharmacy Admin Tracking Only    PHSO: Yes  Total # of Interventions Recommended: 2  - Increased Dose #: 1  - Discontinued Medication #: 1 Discontinue Reason(s): No Longer Used  - Maintenance Safety Lab Monitoring #: 1  Recommended intervention potential cost savings:   Accepted intervention potential cost savings:    Total Interventions Accepted: 3  Time Spent (min): 30    Maxim WhitneyD

## 2020-11-30 ENCOUNTER — HOSPITAL ENCOUNTER (OUTPATIENT)
Dept: CARDIAC REHAB | Age: 80
Setting detail: THERAPIES SERIES
Discharge: HOME | End: 2020-11-30
Payer: MEDICARE

## 2020-11-30 PROCEDURE — 93798 PHYS/QHP OP CAR RHAB W/ECG: CPT

## 2020-11-30 NOTE — PROGRESS NOTES
Phase II Cardiac Rehab Individualized Treatment Plan-30 Day     Patient Name: Hal Blankenship  Date of Initial Assessment: 11/30/2020  ACCOUNT #: [de-identified]  Diagnosis: s/p mitral valve replacement    Onset Date: 10/6/20  Referring Physician: Anette Vance   Risk Stratification: mod  Session Number: 7   EXERCISE    Stages of Change:   [] pre-contemplation   [x] Action   [] Contemplate   [] Maintainence   [x] Prep   [] Relapse          Exercise Prescription:  Mode: [x] TM [x] B [x] STP  [x] R  Frequency: 3 x week  Duration: 31-60 minutes  Intensity: 2.8 mets  Plan/Goal: Increase 1-2 levels/week or 1-2 min/week to achieve target HR and RPE   12-16 on Rachel RPE Scale. Progression: Patient started program at 2.2 mets and has increased to 2.8 in the past 30 days. Target -120    Maximum HR 83     [] Angina with Exertion THR: 100-120   [] Resistance Training Weight (lbs):   Reps:     Hypertension:  [x] Yes  [] No  Resting BP: 104/64  Peak Exercise BP: 116/68  [] Med change? Intervention:  Home Exercise:  Type: walking  Duration: 30 minutes  Frequency: 3 x week   [] Resistance Training    Education:   [x] Equipment [place]  [x] Self pulse   [] Proper use weights/therabands   [x] S/S to report  [x] Low Na Diet    [x] Warm up/ Cool down  [] BP Medication    [x] RPE Scale   [] Understand BP   [x] Ex Safety   [] Exercise specialist class-Home Exercise       Target Goal:   -Individual Exercise Plan  -BP<140/90 or <130/80 if DM   -Aerobic active 30 + minutes 5-7 days per week    Nutrition    Stages of Change:   [] pre-contemplation  [x] Action   [] Contemplate    [] Maintainence   [x] Prep    [] Relapse    Lipids: (5/11/19)  Total Cholesterol: 180  Triglycerides: 39  HDL: 67  LDL: 105.2  [] Med Change? Diabetes:  [] Yes  [x] No  Random BS:  HbA1c: 5.7 (12/7/18)  [] Med Change?     Weight Management:  Wt Goal: 1-2 lbs/wk    Intervention:   [] Dietitian Consult       [x] Nurse/Patient Discussion     [] Diet Class           [] Referred to Diabetes Education     Education:  [] S&S hypo/hyperglycemia  [x] Low fat/low cholesterol diet  [] Weight loss methods      [] Relate Diabetes/CAD     [x] Eating heart healthy handout    Target Goal:  -LDL-C<100 if triglycerides are > 200  -LDL-C < 70 for high risk patients  -HbA1c < 7%  -BMI < 25   Education    Stages of Change:    [] pre-contemplation  [x] Action   [] Contemplate    [] Maintainence   [x] Prep    [] Relapse    Family support: [x] Yes  [] No    Tobacco use: [] Yes  [x] No    Intervention:  [] Referred to smoking cessation counselor     [] Individual education and counseling  [] Tobacco adjunct  [] Informed of education class schedule     Education:   [] Risk Factors/Modifications  [x] Psychological aspects  [] Angina    [] Medications  [x] CHF      [] Cardiac A&P    Target Goal:  -Complete cessation of tobacco use (if applicable)  -Continued risk factor modifications  -Recognizing signs/symptoms to report  -Proper use of meds    Psychosocial  Stages of Change:    [] pre-contemplation  [x] Action   [] Contemplate    [] Maintainence   [x] Prep    [] Relapse      Intervention:   [] Psych Consult/  [x] Uses stress management skills    [] Physician Referral    [] Stress management class   [] Med Change? Education:    [x] Coping Techniques   [x] Relaxation techniques   [x] S/S of Depression    Target Goal:  -Assess presence or absence of depression using a valid screening tool. -Maximize coping skills.  -Positive support system.     Preventative Medication:   [x] Aspirin       [x] Beta Blockade      [] Statin or other lipid lowering agent     [] Clopidogrel   [] ACE Inhibitor   [x] Other anticoagulation medications     Fall Risk assess: [x] Yes  [] No  Assistive Device:   [] Cane  [] Walker [] Wheel Chair  [] Gait belt    Patient/Program goal:   increased stamina/strength to 30-50 total exercise by increasing 1-2 level/wk and 1-2   min/wk  to achieve THR and RPE 11-13 Initial METS of 2.2; has increased to 2.8 mets in the past 30 days.  -introduce weights/ therabands 2-4# for 5-10 reps Will introduce at future session.  -manage BP better Blood pressure very well controlled. -improved cholesterol and  Triglycerides Results from 5/11/19 reviewed and WNL. Heart healthy diet info provided. Rate Your Plate and food diary completed. Will schedule dietician consult, if desired.  -develop regular exercise 30 min daily Patient has been walking 20-30 minutes daily and has been routinely completing stretches at home. Patient encouraged to continue as tolerated.   Physician Changes/Comments:      Cardiac Rehab Staff

## 2020-12-03 ENCOUNTER — HOSPITAL ENCOUNTER (OUTPATIENT)
Dept: CARDIAC REHAB | Age: 80
Setting detail: THERAPIES SERIES
Discharge: HOME | End: 2020-12-03
Payer: MEDICARE

## 2020-12-03 PROCEDURE — 93798 PHYS/QHP OP CAR RHAB W/ECG: CPT

## 2020-12-04 ENCOUNTER — TELEPHONE (OUTPATIENT)
Dept: PHARMACY | Age: 80
End: 2020-12-04

## 2020-12-04 NOTE — TELEPHONE ENCOUNTER
Recent Travel Screening and Travel History documentation:     Travel Screening     Question   Response    In the last month, have you been in contact with someone who was confirmed or suspected to have Coronavirus / COVID-19? No / Unsure    Have you had a COVID-19 viral test in the last 14 days? No    Do you have any of the following new or worsening symptoms? None of these    Have you traveled internationally in the last month? No      Travel History   Travel since 11/04/20     No documented travel since 11/04/20         Patient denies fever and respiratory symptoms and understands that it will be an office visit INSIDE.     Krystian Jules, PharmD 12/4/2020 9:45 AM

## 2020-12-07 ENCOUNTER — HOSPITAL ENCOUNTER (OUTPATIENT)
Dept: CARDIAC REHAB | Age: 80
Setting detail: THERAPIES SERIES
Discharge: HOME | End: 2020-12-07
Payer: MEDICARE

## 2020-12-07 ENCOUNTER — HOSPITAL ENCOUNTER (OUTPATIENT)
Dept: PHARMACY | Age: 80
Setting detail: THERAPIES SERIES
Discharge: HOME | End: 2020-12-07
Payer: MEDICARE

## 2020-12-07 VITALS
BODY MASS INDEX: 25.58 KG/M2 | DIASTOLIC BLOOD PRESSURE: 59 MMHG | TEMPERATURE: 97.5 F | HEART RATE: 61 BPM | WEIGHT: 149 LBS | SYSTOLIC BLOOD PRESSURE: 97 MMHG

## 2020-12-07 LAB — INR BLD: 1.5

## 2020-12-07 PROCEDURE — 85610 PROTHROMBIN TIME: CPT

## 2020-12-07 PROCEDURE — 93798 PHYS/QHP OP CAR RHAB W/ECG: CPT

## 2020-12-07 PROCEDURE — 99212 OFFICE O/P EST SF 10 MIN: CPT

## 2020-12-07 NOTE — PATIENT INSTRUCTIONS
Continue to monitor urine and stool. Continue to monitor for signs of bleeding. Return to clinic in 2 weeks. Take warfarin 2 tablets for 6 mg today then increase to 1.5 tablets for 4.5 mg on M,F and whole 3 mg tablet all other days.

## 2020-12-07 NOTE — PROGRESS NOTES
[facility]  Medication Management  ANTICOAGULATION    Referring Doctor: Tanika Morning INR: 2-3    TODAY'S INR: 1.5    WARFARIN Dosage: Take warfarin 2 tablets for 6 mg today then increase to 1.5 tablets for 4.5 mg on M,F and whole 3 mg tablet all other days. INR (no units)   Date Value   12/07/2020 1.5   11/23/2020 1.5   11/11/2020 1.7   10/26/2020 2.2   10/19/2020 2.2   10/15/2020 3.1       Hemoglobin (g/dL)   Date Value   11/10/2020 11.2 (L)   10/15/2020 8.2 (L)   06/29/2020 12.7     Hematocrit (%)   Date Value   11/10/2020 35.9 (L)   10/15/2020 26.4 (L)   06/29/2020 39.6     Platelets (k/uL)   Date Value   11/10/2020 227   10/15/2020 170   06/29/2020 166     Notes:    Fingerstick INR drawn per clinic protocol. Patient states no visible blood in urine and no black tarry stool. Denies any missed doses of warfarin. No change in other maintenance medications or in diet. Will recheck INR in 2 weeks. Patient acknowledges working in consult agreement with pharmacist as referred by his/her physician. CLINICAL PHARMACY CONSULT: MED RECONCILIATION/REVIEW ADDENDUM    For Pharmacy Admin Tracking Only    PHSO: Yes  Total # of Interventions Recommended: 2  - Increased Dose #: 2  - Maintenance Safety Lab Monitoring #: 1  Recommended intervention potential cost savings:   Accepted intervention potential cost savings:    Total Interventions Accepted: 3  Time Spent (min): 30    Prakash Davenport PharmD

## 2020-12-10 ENCOUNTER — HOSPITAL ENCOUNTER (OUTPATIENT)
Dept: CARDIAC REHAB | Age: 80
Setting detail: THERAPIES SERIES
Discharge: HOME | End: 2020-12-10
Payer: MEDICARE

## 2020-12-10 PROCEDURE — 93798 PHYS/QHP OP CAR RHAB W/ECG: CPT

## 2020-12-14 ENCOUNTER — HOSPITAL ENCOUNTER (OUTPATIENT)
Dept: CARDIAC REHAB | Age: 80
Setting detail: THERAPIES SERIES
Discharge: HOME | End: 2020-12-14
Payer: MEDICARE

## 2020-12-14 PROCEDURE — 93798 PHYS/QHP OP CAR RHAB W/ECG: CPT

## 2020-12-17 ENCOUNTER — HOSPITAL ENCOUNTER (OUTPATIENT)
Dept: CARDIAC REHAB | Age: 80
Setting detail: THERAPIES SERIES
Discharge: HOME | End: 2020-12-17
Payer: MEDICARE

## 2020-12-17 PROCEDURE — 93798 PHYS/QHP OP CAR RHAB W/ECG: CPT

## 2020-12-21 ENCOUNTER — HOSPITAL ENCOUNTER (OUTPATIENT)
Dept: PHARMACY | Age: 80
Setting detail: THERAPIES SERIES
Discharge: HOME | End: 2020-12-21
Payer: MEDICARE

## 2020-12-21 VITALS
DIASTOLIC BLOOD PRESSURE: 55 MMHG | BODY MASS INDEX: 25.58 KG/M2 | HEART RATE: 57 BPM | SYSTOLIC BLOOD PRESSURE: 112 MMHG | WEIGHT: 149 LBS | TEMPERATURE: 97.7 F

## 2020-12-21 PROBLEM — M62.830 MUSCLE SPASM OF BACK: Status: ACTIVE | Noted: 2020-12-21

## 2020-12-21 LAB — INR BLD: 2

## 2020-12-21 PROCEDURE — 85610 PROTHROMBIN TIME: CPT

## 2020-12-21 PROCEDURE — 99211 OFF/OP EST MAY X REQ PHY/QHP: CPT

## 2020-12-21 NOTE — PROGRESS NOTES
[facility]  Medication Management  ANTICOAGULATION    Referring Doctor: Polina Blank INR: 2-3    TODAY'S INR: 2.0    WARFARIN Dosage: Continue 1.5 tablets for 4.5 mg on M,F and whole 3 mg tablet all other days. INR (no units)   Date Value   12/21/2020 2   12/07/2020 1.5   11/23/2020 1.5   11/11/2020 1.7   10/26/2020 2.2   10/19/2020 2.2   10/15/2020 3.1       Hemoglobin (g/dL)   Date Value   11/10/2020 11.2 (L)   10/15/2020 8.2 (L)   06/29/2020 12.7     Hematocrit (%)   Date Value   11/10/2020 35.9 (L)   10/15/2020 26.4 (L)   06/29/2020 39.6     Platelets (k/uL)   Date Value   11/10/2020 227   10/15/2020 170   06/29/2020 166       Notes:    Fingerstick INR drawn per clinic protocol. Patient states no visible blood in urine and no black tarry stool. Denies any missed doses of warfarin. No change in other maintenance medications or in diet. Will recheck INR in 4 weeks. Patient acknowledges working in consult agreement with pharmacist as referred by his/her physician. CLINICAL PHARMACY CONSULT: MED RECONCILIATION/REVIEW ADDENDUM    For Pharmacy Admin Tracking Only    PHSO: Yes  Total # of Interventions Recommended: 0    - Maintenance Safety Lab Monitoring #: 0  Recommended intervention potential cost savings:   Accepted intervention potential cost savings:    Total Interventions Accepted: 1  Time Spent (min): 15    Waqas Sarkar PharmD

## 2020-12-21 NOTE — PATIENT INSTRUCTIONS
Continue to monitor urine and stool. Continue to monitor for signs of bleeding. Return to clinic in 4 weeks. Continue 1.5 tablets for 4.5 mg on M,F and whole 3 mg tablet all other days.

## 2020-12-22 ENCOUNTER — OFFICE VISIT (OUTPATIENT)
Dept: CARDIOLOGY | Age: 80
End: 2020-12-22
Payer: MEDICARE

## 2020-12-22 VITALS
SYSTOLIC BLOOD PRESSURE: 95 MMHG | HEIGHT: 64 IN | BODY MASS INDEX: 25.44 KG/M2 | DIASTOLIC BLOOD PRESSURE: 59 MMHG | RESPIRATION RATE: 16 BRPM | WEIGHT: 149 LBS | HEART RATE: 64 BPM | OXYGEN SATURATION: 97 %

## 2020-12-22 PROCEDURE — 99214 OFFICE O/P EST MOD 30 MIN: CPT | Performed by: FAMILY MEDICINE

## 2020-12-22 NOTE — PATIENT INSTRUCTIONS
SURVEY:    You may be receiving a survey from [employer] regarding your visit today. Please complete the survey to enable us to provide the highest quality of care to you and your family. If you cannot score us a very good on any question, please call the office to discuss how we could have made your experience a very good one. Thank you.

## 2020-12-22 NOTE — PROGRESS NOTES
Lili Rogers RN am scribing for and in the presence of Karina Gross MD, MS, F.A.C.C. Patient: Elean Peñaloza  : 1940  Date of Visit: 2020    REASON FOR VISIT / CONSULTATION: Follow-up (HX:PAF, CHF, MVR. Weight last visit was 149 lbs and todays weight is 149 lbs. Pt reports she has been feeling pretty good otjher than back spasms-Dr Dominic Oleary did start her on baclofen. She did cancel cardiac rehab  due to back spasms. Hx: Mitral valve replacement in Oct 2020. Denies SOB, Palpitations, Dizziness, lightheadedness, CP. )      Dear Nilton Parra MD,    I had the pleasure of seeing Elena Peñaloza today. Ms. Vane Hough is a [de-identified] y.o. female with a history of paroxysmal atrial fibrillation. She reports that this 1st occurred a few year ago and most recently occurred first part of February  but she says she has always had some degree of palpitations. Her last previous sustained event was about a year ago. She also has a history of mitral valve prolapse but denies any heart attacks, other cardiac issues or stroke or mini stroke. She underwent a cardiovascular stress test on 2018 that had show to be consistent with low to intermediate risk of coronary artery disease. Her cardiac catheterization on 2018 had shown mild irregularities with the worst being in her LAD at 20-30%. She was also admitted to the hospital for tikosyn loading on 2018 and has been maintained on this ever since with good results. She had an Echo done on 2020 that showed the left atrium that is severely dilated (>40) with a left atrial volume index of 76 ml/m2. Bileaflet prolapse with more prominent P2 prolapse and moderate to severe mitral regurgitation. Moderate tricuspid regurgitation. Mild pulmonary hypertension with estimated pulmonary artery systolic pressure of 33 mmHg. Moderate diastolic dysfunction. Her EF was 60%.  Mitral valve replacement done at [facility] using 29 mm Epic Bioprosthetic valve on 10/06/2020. MAZE procedure done on 10/06/2020. Ms. Springer reports doing fairly well since last visit without really any complaints. She does have an appointment next year with [facility] to discuss further direction on anticoagulation however she does not believe this to be necessary and is planning to at least delay if not cancel it due to covid. She denied any chest pain or discomfort, lightheadedness/dizziness, abdominal pain, bleeding problems, problems with her medications or any other concerns at this time. She denies any known repeat episodes of atrial fibrillation or palpitations. She denies any lightheadedness or dizziness. However Ms. Springer reports that she has had some back spasms but says she is taking baclofen for this. Weight has stayed the same since last visit   Wt Readings from Last 3 Encounters:   12/22/20 149 lb (67.6 kg)   12/21/20 149 lb (67.6 kg)   12/21/20 149 lb (67.6 kg)       Past Medical History:   Diagnosis Date    [facility]     CHF (congestive heart failure) [facility]     Essential hypertension     Fracture of sacrum ([facility] 2011    GERD (gastroesophageal reflux disease)     H/O cardiovascular stress test 02/28/2018    Equivocal myocardial perfusion study. There is a small/moderate perufsion defect of mild intensity in the apical an anteroapical regions during stress imaging which is most consistent with artifact but may be due to a small degree of coronary ischemia. Overall, these results are most consistent with a low/intermediate risk for CAD.     H/O echocardiogram 02/02/2018    EF 60%. Mildly increased left ventricular wall thickness. No definite specific wall motion abnormalities. Bi-atrial enlargment. Myxomatous thickening of the mitral leaflets with bileaflet prolapse and moderat mitral regurg. Moderate tricuspid regurg. Mild pulmonary HTN with an estimated right ventricualr systolic pressure [id number].      History of Holter monitoring 2016    PACs & PVCs  Irritable bowel syndrome     Mitral valve prolapse     Osteoarthritis     Osteopenia     Paroxysmal atrial fibrillation (HCC) 2018    Rosacea     Tendinitis of left shoulder        CURRENT ALLERGIES: Butalbital-aspirin-caffeine and Other REVIEW OF SYSTEMS: 14 systems were reviewed. Pertinent positives and negatives as above, all else negative.      Past Surgical History:   Procedure Laterality Date    BUNIONECTOMY      CARDIAC CATHETERIZATION Left 07/02/2020    DR Gross/[facility]/right radial-    COLONOSCOPY  2008    CYSTOCELE REPAIR      ENDOSCOPY, COLON, DIAGNOSTIC      EYE SURGERY      HERNIA REPAIR      HYSTERECTOMY      MOHS SURGERY  2008    SKIN BIOPSY      LUBA AND BSO Bilateral 2014    TRANSESOPHAGEAL ECHOCARDIOGRAM  07/02/2020    Dr Kasey Telles/Unsuccessful attempt    TRANSESOPHAGEAL ECHOCARDIOGRAM  07/09/2020    Dr Jack Telles/Failed, anesthesia sedate    UPPER GASTROINTESTINAL ENDOSCOPY N/A 12/10/2019    -antral erythema,bx(mild chronic inactive gastritis with focal intestinal metaplasia,neg H-Pylori)dilation    UPPER GASTROINTESTINAL ENDOSCOPY  12/10/2019    EGD DILATION SAVORY performed by Elijah Fonseca MD at [address] [place] [address]  2003    Sling    VASCULAR SURGERY      VEIN SURGERY      Social History:  Social History     Tobacco Use    Smoking status: Never Smoker    Smokeless tobacco: Never Used   Substance Use Topics    Alcohol use: Never     Frequency: Never     Drinks per session: Patient refused     Binge frequency: Never     Comment: occasionally    Drug use: No        CURRENT MEDICATIONS:  Outpatient Medications Marked as Taking for the 12/22/20 encounter (Office Visit) with April Yeung MD   Medication Sig Dispense Refill    baclofen (LIORESAL) 10 MG tablet Take 1 tablet by mouth 2 times daily as needed (back spasm) 30 tablet 0    carboxymethylcellulose 1 % ophthalmic solution Place 1 drop into both eyes 3 times daily as needed for Dry Eyes      metoprolol succinate (TOPROL XL) 25 MG extended release tablet Take 1 tablet by mouth daily 90 tablet 3    warfarin (COUMADIN) 3 MG tablet Take 1 tablet by mouth daily As directed by [facility]mad[facility] Clinic   Half tablet for 1.5 mg on Sundays and whole 3 mg tablet all other days. 90 tablet 3    acetaminophen (TYLENOL) 500 MG tablet Take 1,000 mg by mouth every 6 hours as needed for Pain      polyethylene glycol (GLYCOLAX) 17 g packet Take 17 g by mouth daily as needed for Constipation      omeprazole (PRILOSEC) 40 MG delayed release capsule Take 1 capsule by mouth daily 90 capsule 2    aspirin EC 81 MG EC tablet Take 1 tablet by mouth daily 90 tablet 3    vitamin D (CHOLECALCIFEROL) 1000 UNIT TABS tablet Take 1,000 Units by mouth nightly       docusate sodium (COLACE) 100 MG capsule Take 100 mg by mouth daily       Magnesium 400 MG TABS Take 1 tablet by mouth nightly          FAMILY HISTORY: family history includes Arthritis in her sister and sister; Atrial Fibrillation in her sister; Diabetes in her brother and mother; Heart Attack in her brother; Heart Disease in her mother; High Blood Pressure in her father. PHYSICAL EXAM:   BP (!) 95/59 (Site: Right Upper Arm, Position: Sitting, Cuff Size: Medium Adult)   Pulse 64   Resp 16   Ht 5' 4\" (1.626 m)   Wt 149 lb (67.6 kg)   SpO2 97%   BMI 25.58 kg/m²  Body mass index is 25.58 kg/m². Constitutional: She is oriented to person, place, and time. She appears well-developed and well-nourished. In no acute distress. HEENT: Normocephalic and atraumatic. No JVD present. Carotid bruit is not present. No mass and no thyromegaly present. No lymphadenopathy present. Cardiovascular: Normal rate, regular rhythm, normal heart sounds. Exam reveals no gallop and no friction rubs. 2/6 systolic murmur, 5th intercostal space on the LEFT in the mid-clavicular line (cardiac apex).   Pulmonary/Chest: Effort normal and breath sounds normal. No respiratory distress. She has no wheezes, rhonchi or rales. Abdominal: Soft, non-tender. Bowel sounds and aorta are normal. She exhibits no organomegaly, mass or bruit. Extremities:  Trace lower extremity edema. No cyanosis and no clubbing. Pulses are 2+ radial and carotid pulses. 2+ dorsalis pedis and posterior tibial pulses bilaterally. Neurological: She is alert and oriented to person. No evidence of gross cranial nerve deficit. Coordination appeared normal.   Skin: Skin is warm and dry. There is no rash or diaphoresis. Psychiatric: She has a normal mood and affect. Her speech is normal and behavior is normal.      MOST RECENT LABS ON RECORD:   Lab Results   Component Value Date    WBC 4.9 11/10/2020    HGB 11.2 (L) 11/10/2020    HCT 35.9 (L) 11/10/2020     11/10/2020    CHOL 180 05/11/2019    TRIG 39 05/11/2019    HDL 67 05/11/2019    ALT 20 12/07/2018    AST 26 12/07/2018     11/10/2020    K 4.3 11/10/2020     11/10/2020    CREATININE 0.59 11/10/2020    BUN 12 11/10/2020    CO2 27 11/10/2020    TSH 2.46 04/24/2018    INR 2 12/21/2020    LABA1C 5.7 12/07/2018     ASSESSMENT:  1. Severe mitral regurgitation    2. H/O mitral valve replacement    3. PAF (paroxysmal atrial fibrillation) ([address]    4. Current use of long term anticoagulation    5. Chronic diastolic heart failure ([address]    6. Encounter for current long-term use of anticoagulants       PLAN:  · History of Severe Mitral Regurgitation: s/p mitral valve replacement done on 10/6/2020, with 29 mm Epic valve (Bioprosthetic). Currently appears well controlled. · Beta Blocker Therapy: Continue Metoprolol succinate (Toprol XL)  25 mg  daily. · Diuretics: not indicated at this time  · Counseling: Although I did let her know the benefits of cardiac rehab, however since it does make her nervous being in the hospital with a COVID pandemic I do believe it is ok for her to continue her exercises at home.    · I will plan to order salt in their diet. Finally, I recommended that she continue her other medications and follow up with you as previously scheduled. FOLLOW UP:   I told Ms. Springer to call my office if she had any problems, but otherwise I asked her to Return in about 3 months (around 3/22/2021). However, I would be happy to see her sooner should the need arise. Sincerely,  Merle Broderick. Renato MARTIN, MS, F.A.C.C. [facility] Card[facility] Specialist    [address] [place] [address] [address], [address] [facility]  Phone: [phone], Fax: [phone]     I believe that the risk of significant morbidity and mortality related to the patient's current medical conditions are: intermediate    The documentation recorded by the scribe, accurately and completely reflects the services I personally performed and the decisions made by me. Zack Brower MD, MS, F.A.C.C.  December 22, 2020

## 2021-01-19 ENCOUNTER — TELEPHONE (OUTPATIENT)
Dept: PHARMACY | Age: 81
End: 2021-01-19

## 2021-01-19 NOTE — TELEPHONE ENCOUNTER

## 2021-01-20 ENCOUNTER — HOSPITAL ENCOUNTER (OUTPATIENT)
Dept: PHARMACY | Age: 81
Setting detail: THERAPIES SERIES
Discharge: HOME | End: 2021-01-20
Payer: MEDICARE

## 2021-01-20 VITALS
WEIGHT: 150 LBS | DIASTOLIC BLOOD PRESSURE: 61 MMHG | BODY MASS INDEX: 25.75 KG/M2 | HEART RATE: 57 BPM | SYSTOLIC BLOOD PRESSURE: 93 MMHG | TEMPERATURE: 97.2 F

## 2021-01-20 DIAGNOSIS — I48.0 PAROXYSMAL ATRIAL FIBRILLATION (HCC): ICD-10-CM

## 2021-01-20 DIAGNOSIS — Z79.01 ENCOUNTER FOR CURRENT LONG-TERM USE OF ANTICOAGULANTS: ICD-10-CM

## 2021-01-20 LAB — INR BLD: 1.9

## 2021-01-20 PROCEDURE — 85610 PROTHROMBIN TIME: CPT

## 2021-01-20 PROCEDURE — 99211 OFF/OP EST MAY X REQ PHY/QHP: CPT

## 2021-01-20 RX ORDER — TRIAMCINOLONE ACETONIDE 1 MG/G
CREAM TOPICAL 2 TIMES DAILY
COMMUNITY
Start: 2021-01-12 | End: 2021-11-03 | Stop reason: ALTCHOICE

## 2021-01-20 NOTE — PROGRESS NOTES
[facility]/[place]  Medication Management  ANTICOAGULATION    Referring Doctor: Brittany Arellano INR: 2-3    TODAY'S INR: 1.9    WARFARIN Dosage: Continue 1.5 tablets for 4.5 mg on M,F and whole 3 mg tablet all other days. INR (no units)   Date Value   01/20/2021 1.9   12/21/2020 2   12/07/2020 1.5   11/23/2020 1.5   11/11/2020 1.7   10/26/2020 2.2   10/19/2020 2.2     Medication changes:  Kenalog started for back    Notes:    Fingerstick INR drawn per clinic protocol. Patient states no visible blood in urine and no black tarry stool. Denies any missed doses of warfarin. No change in other maintenance medications or in diet. Will recheck INR in 6 weeks. Patient acknowledges working in consult agreement with pharmacist as referred by his/her physician. CLINICAL PHARMACY CONSULT: MED RECONCILIATION/REVIEW ADDENDUM    For Pharmacy Admin Tracking Only    PHSO: Yes  Total # of Interventions Recommended: 1  - Increased Dose #: 1  - Maintenance Safety Lab Monitoring #: 1  Recommended intervention potential cost savings:   Accepted intervention potential cost savings:    Total Interventions Accepted: 2  Time Spent (min): 30    Srikanth Reilly, MaximD

## 2021-01-20 NOTE — PATIENT INSTRUCTIONS
Continue to monitor urine and stool. Continue to monitor for signs of bleeding. Return to clinic in 6 weeks. Continue 1.5 tablets for 4.5 mg on M,F and whole 3 mg tablet all other days.

## 2021-02-12 PROBLEM — I50.32 CHRONIC DIASTOLIC HEART FAILURE (HCC): Status: ACTIVE | Noted: 2021-02-12

## 2021-03-02 ENCOUNTER — TELEPHONE (OUTPATIENT)
Dept: PHARMACY | Age: 81
End: 2021-03-02

## 2021-03-02 NOTE — TELEPHONE ENCOUNTER

## 2021-03-03 ENCOUNTER — HOSPITAL ENCOUNTER (OUTPATIENT)
Dept: PHARMACY | Age: 81
Setting detail: THERAPIES SERIES
Discharge: HOME | End: 2021-03-03
Payer: MEDICARE

## 2021-03-03 VITALS
WEIGHT: 151 LBS | HEART RATE: 59 BPM | SYSTOLIC BLOOD PRESSURE: 99 MMHG | TEMPERATURE: 97.2 F | DIASTOLIC BLOOD PRESSURE: 64 MMHG | BODY MASS INDEX: 25.92 KG/M2

## 2021-03-03 DIAGNOSIS — Z79.01 ENCOUNTER FOR CURRENT LONG-TERM USE OF ANTICOAGULANTS: ICD-10-CM

## 2021-03-03 DIAGNOSIS — I48.0 PAROXYSMAL ATRIAL FIBRILLATION (HCC): ICD-10-CM

## 2021-03-03 LAB — INR BLD: 1.7

## 2021-03-03 PROCEDURE — 99212 OFFICE O/P EST SF 10 MIN: CPT

## 2021-03-03 PROCEDURE — 85610 PROTHROMBIN TIME: CPT

## 2021-03-03 RX ORDER — CYCLOSPORINE 0.5 MG/ML
1 EMULSION OPHTHALMIC 2 TIMES DAILY
COMMUNITY

## 2021-03-03 NOTE — PROGRESS NOTES
[facility] [address] [facility]/[place]  Medication Management  ANTICOAGULATION    Referring Doctor: Otto Johnson INR: 2-3    TODAY'S INR: 1.7     WARFARIN Dosage: Increase warfarin to 1.5 tablets for 4.5 mg on MWF and whole 3 mg tablet all other days. INR (no units)   Date Value   01/20/2021 1.9   12/21/2020 2   12/07/2020 1.5   11/23/2020 1.5   11/11/2020 1.7   10/26/2020 2.2   10/19/2020 2.2     Medication changes:  Starting Restasis, stopped Baclofen    Notes:    Fingerstick INR drawn per clinic protocol. Patient states no visible blood in urine and no black tarry stool. Denies any missed doses of warfarin. No change in other maintenance medications or in diet. Will recheck INR in 3 weeks. Patient acknowledges working in consult agreement with pharmacist as referred by his/her physician. CLINICAL PHARMACY CONSULT: MED RECONCILIATION/REVIEW ADDENDUM    For Pharmacy Admin Tracking Only    PHSO: Yes  Total # of Interventions Recommended: 3  - Increased Dose #: 1  - Discontinued Medication #: 1 Discontinue Reason(s): No Longer Used  - New Order #: 1 New Medication Order Reason(s): Needs Additional Medication Therapy  - Maintenance Safety Lab Monitoring #: 1  Recommended intervention potential cost savings:   Accepted intervention potential cost savings:    Total Interventions Accepted: 4  Time Spent (min): 30    Cindy Yañez, MaximD

## 2021-03-29 ENCOUNTER — TELEPHONE (OUTPATIENT)
Dept: PHARMACY | Age: 81
End: 2021-03-29

## 2021-03-29 NOTE — TELEPHONE ENCOUNTER
Recent Travel Screening and Travel History documentation:     Travel Screening     Question   Response    In the last month, have you been in contact with someone who was confirmed or suspected to have Coronavirus / COVID-19? Unable to assess    Have you had a COVID-19 viral test in the last 14 days? Unable to assess    Do you have any of the following new or worsening symptoms? Unable to assess    Have you traveled internationally or domestically in the last month? Unable to assess      Travel History   Travel since 02/28/21     No documented travel since 02/28/21         Had to leave [facility].     Mera Rodríguez, PharmD 3/29/2021 12:08 PM

## 2021-03-30 ENCOUNTER — OFFICE VISIT (OUTPATIENT)
Dept: CARDIOLOGY | Age: 81
End: 2021-03-30
Payer: MEDICARE

## 2021-03-30 ENCOUNTER — HOSPITAL ENCOUNTER (OUTPATIENT)
Dept: PHARMACY | Age: 81
Setting detail: THERAPIES SERIES
Discharge: HOME | End: 2021-03-30
Payer: MEDICARE

## 2021-03-30 VITALS
RESPIRATION RATE: 18 BRPM | HEIGHT: 64 IN | SYSTOLIC BLOOD PRESSURE: 100 MMHG | DIASTOLIC BLOOD PRESSURE: 60 MMHG | WEIGHT: 150.2 LBS | HEART RATE: 48 BPM | OXYGEN SATURATION: 98 % | BODY MASS INDEX: 25.64 KG/M2

## 2021-03-30 VITALS
DIASTOLIC BLOOD PRESSURE: 60 MMHG | WEIGHT: 150.6 LBS | HEART RATE: 53 BPM | BODY MASS INDEX: 25.85 KG/M2 | SYSTOLIC BLOOD PRESSURE: 108 MMHG

## 2021-03-30 DIAGNOSIS — I48.0 PAROXYSMAL ATRIAL FIBRILLATION (HCC): ICD-10-CM

## 2021-03-30 DIAGNOSIS — I34.0 SEVERE MITRAL REGURGITATION: Primary | ICD-10-CM

## 2021-03-30 DIAGNOSIS — Z79.01 ENCOUNTER FOR CURRENT LONG-TERM USE OF ANTICOAGULANTS: ICD-10-CM

## 2021-03-30 DIAGNOSIS — I50.32 CHRONIC DIASTOLIC HEART FAILURE (HCC): ICD-10-CM

## 2021-03-30 DIAGNOSIS — I48.0 PAF (PAROXYSMAL ATRIAL FIBRILLATION) (HCC): ICD-10-CM

## 2021-03-30 DIAGNOSIS — Z95.2 H/O MITRAL VALVE REPLACEMENT: ICD-10-CM

## 2021-03-30 LAB — INR BLD: 1.8

## 2021-03-30 PROCEDURE — 99214 OFFICE O/P EST MOD 30 MIN: CPT | Performed by: FAMILY MEDICINE

## 2021-03-30 PROCEDURE — 99211 OFF/OP EST MAY X REQ PHY/QHP: CPT | Performed by: FAMILY MEDICINE

## 2021-03-30 PROCEDURE — 85610 PROTHROMBIN TIME: CPT | Performed by: FAMILY MEDICINE

## 2021-03-30 PROCEDURE — 93000 ELECTROCARDIOGRAM COMPLETE: CPT | Performed by: FAMILY MEDICINE

## 2021-03-30 RX ORDER — METOPROLOL SUCCINATE 25 MG/1
12.5 TABLET, EXTENDED RELEASE ORAL DAILY
Qty: 45 TABLET | Refills: 3 | Status: SHIPPED | OUTPATIENT
Start: 2021-03-30 | End: 2022-04-19 | Stop reason: SDUPTHER

## 2021-03-30 NOTE — PROGRESS NOTES
[facility] [address] [facility]/[place]  Medication Management  ANTICOAGULATION    Referring Doctor: Dr Vern Young INR: 2.0-3.0    TODAY'S INR: 1.8    WARFARIN Dosage: increase 6% to 3mg MWF, 4.5mg all other days    INR (no units)   Date Value   03/30/2021 1.8   03/03/2021 1.7   01/20/2021 1.9   12/21/2020 2   12/07/2020 1.5   11/23/2020 1.5   11/11/2020 1.7       Medication changes:  No changes   Notes:    Fingerstick INR drawn per clinic protocol. Patient states no visible blood in urine and no black tarry stool. Denies any missed doses of warfarin. No change in other maintenance medications or in diet. Will recheck INR in 2 weeks. Patient sees Dr Portia Ling following this appointment. Patient states she will be discussing with him anticoagulation and whether she will remain on warfarin or change to apixaban. Patient will contact our clinic with any changes. Patient acknowledges working in consult agreement with pharmacist as referred by his/her physician. CLINICAL PHARMACY CONSULT: MED RECONCILIATION/REVIEW ADDENDUM    For Pharmacy Admin Tracking Only    PHSO: Yes  Total # of Interventions Recommended: 1  - Increased Dose #: 1  - Maintenance Safety Lab Monitoring #: 2  Total Interventions Accepted: 2  Time Spent (min): 174 [facility]      Patient stopped at clinic after appt with Dr Nando Lara and will be remaining on warfarin therapy. Caryle  requested a refill be called to [facility]. Marisel Bardales Doctor line for Warfarin 3mg #110 3mg MWF, 4.5mg all other days or as instructed by coumadin clinic 3 refills.

## 2021-03-30 NOTE — PROGRESS NOTES
[facility], [place] am scribing for and in the presence of Pop Mcpherson. Renato MARTIN, MS, F.A.C.C..      Patient: Luis Carlos Schmidt  : 1940  Date of Visit: 2021    REASON FOR VISIT / CONSULTATION: Follow-up (HX:severe mitral regurg, PAF,CHF Pt is here for follow up she states she doing well she does have SOB when going up steps carrying laundry. some rare palpitations. DeniesCP,lightheaded/dizziness)      Dear Perfecot Taylor MD,    I had the pleasure of seeing Luis Carlos Schmidt today. Ms. Ana M Borden is a [de-identified] y.o. female with a history of paroxysmal atrial fibrillation. She reports that this 1st occurred a few year ago and most recently occurred first part of February  but she says she has always had some degree of palpitations. Her last previous sustained event was about a year ago. She also has a history of mitral valve prolapse but denies any heart attacks, other cardiac issues or stroke or mini stroke. She underwent a cardiovascular stress test on 2018 that had show to be consistent with low to intermediate risk of coronary artery disease. Her cardiac catheterization on 2018 had shown mild irregularities with the worst being in her LAD at 20-30%. She was also admitted to the hospital for tikosyn loading on 2018 and has been maintained on this ever since with good results. She had an Echo done on 2020 that showed the left atrium that is severely dilated (>40) with a left atrial volume index of 76 ml/m2. Bileaflet prolapse with more prominent P2 prolapse and moderate to severe mitral regurgitation. Moderate tricuspid regurgitation. Mild pulmonary hypertension with estimated pulmonary artery systolic pressure of 33 mmHg. Moderate diastolic dysfunction. Her EF was 60%. Mitral valve replacement done at [facility] using 29 mm Epic Bioprosthetic valve on 10/06/2020. MAZE procedure done on 10/06/2020 as well as left atrial clipping.     Ms. Ana M Borden reports doing fairly well since last visit without really any complaints. She does have some occasional palpitations and shortness of breath when going up steps. She denied any chest pain or discomfort, lightheadedness/dizziness, abdominal pain, bleeding problems, problems with her medications or any other concerns at this time. She denies any known repeat episodes of atrial fibrillation or palpitations. She denies any lightheadedness or dizziness. However Ms. Springer reports that she has had some back spasms but says she is taking baclofen for this. Weight has stayed the same since last visit   Wt Readings from Last 3 Encounters:   03/30/21 150 lb 3.2 oz (68.1 kg)   03/30/21 150 lb 9.6 oz (68.3 kg)   03/03/21 151 lb (68.5 kg)      Exercise Tolerance: Ms. Springer reports that she has a fairly good exercise tolerance. Her says that she walks about 20 minutes without any problems. Past Medical History:   Diagnosis Date    Cancer [facility])     [facility]congestive [facility] [address]     Essential hypertension     Fracture of sacrum ([facility] [address] 2011    GERD (gastroesophageal reflux disease)     H/O cardiovascular stress test 02/28/2018    Equivocal myocardial perfusion study. There is a small/moderate perufsion defect of mild intensity in the apical an anteroapical regions during stress imaging which is most consistent with artifact but may be due to a small degree of coronary ischemia. Overall, these results are most consistent with a low/intermediate risk for CAD.     H/O echocardiogram 02/02/2018    EF 60%. Mildly increased left ventricular wall thickness. No definite specific wall motion abnormalities. Bi-atrial enlargment. Myxomatous thickening of the mitral leaflets with bileaflet prolapse and moderat mitral regurg. Moderate tricuspid regurg. Mild pulmonary HTN with an estimated right ventricualr systolic pressure [id number].      History of Holter monitoring 2016    PACs & PVCs    Irritable bowel syndrome     Mitral valve prolapse     solution Place 1 drop into both eyes 3 times daily as needed for Dry Eyes      metoprolol succinate (TOPROL XL) 25 MG extended release tablet Take 1 tablet by mouth daily 90 tablet 3    warfarin (COUMADIN) 3 MG tablet Take 1 tablet by mouth daily As directed by [facility]   Half tablet for 1.5 mg on Sundays and whole 3 mg tablet all other days. (Patient taking differently: Take 3 mg by mouth See Admin Instructions [facility] - 4.5 mg on MWF and whole 3 mg tablet all other days.) 90 tablet 3    acetaminophen (TYLENOL) 500 MG tablet Take 1,000 mg by mouth every 6 hours as needed for Pain      polyethylene glycol (GLYCOLAX) 17 g packet Take 17 g by mouth daily as needed for Constipation      omeprazole (PRILOSEC) 40 MG delayed release capsule Take 1 capsule by mouth daily 90 capsule 2    aspirin EC 81 MG EC tablet Take 1 tablet by mouth daily 90 tablet 3    vitamin D (CHOLECALCIFEROL) 1000 UNIT TABS tablet Take 1,000 Units by mouth nightly       docusate sodium (COLACE) 100 MG capsule Take 100 mg by mouth daily       Magnesium 400 MG TABS Take 1 tablet by mouth nightly          FAMILY HISTORY: family history includes Arthritis in her sister and sister; Atrial Fibrillation in her sister; Diabetes in her brother and mother; Heart Attack in her brother; Heart Disease in her mother; High Blood Pressure in her father. PHYSICAL EXAM:   /60 (Site: Right Upper Arm, Position: Sitting, Cuff Size: Medium Adult)   Pulse (!) 48   Resp 18   Ht 5' 4\" (1.626 m)   Wt 150 lb 3.2 oz (68.1 kg)   SpO2 98%   BMI 25.78 kg/m²  Body mass index is 25.78 kg/m². Constitutional: She is oriented to person, place, and time. She appears well-developed and well-nourished. In no acute distress. HEENT: Normocephalic and atraumatic. No JVD present. Carotid bruit is not present. No mass and no thyromegaly present. No lymphadenopathy present. Cardiovascular: Bradycardic rate, regular rhythm, normal heart sounds.  Exam reveals no gallop and no friction rubs. 3/6 systolic murmur, 5th intercostal space on the LEFT in the mid-clavicular line (cardiac apex). Pulmonary/Chest: Effort normal and breath sounds normal. No respiratory distress. She has no wheezes, rhonchi or rales. Abdominal: Soft, non-tender. Bowel sounds and aorta are normal. She exhibits no organomegaly, mass or bruit. Extremities:  Trace lower extremity edema. No cyanosis and no clubbing. Pulses are 2+ radial and carotid pulses. 2+ dorsalis pedis and posterior tibial pulses bilaterally. Compression stockings in place. Neurological: She is alert and oriented to person. No evidence of gross cranial nerve deficit. Coordination appeared normal.   Skin: Skin is warm and dry. There is no rash or diaphoresis. Psychiatric: She has a normal mood and affect. Her speech is normal and behavior is normal.      MOST RECENT LABS ON RECORD:   Lab Results   Component Value Date    WBC 4.9 11/10/2020    HGB 11.2 (L) 11/10/2020    HCT 35.9 (L) 11/10/2020     11/10/2020    CHOL 180 05/11/2019    TRIG 39 05/11/2019    HDL 67 05/11/2019    ALT 20 12/07/2018    AST 26 12/07/2018     11/10/2020    K 4.3 11/10/2020     11/10/2020    CREATININE 0.59 11/10/2020    BUN 12 11/10/2020    CO2 27 11/10/2020    TSH 2.46 04/24/2018    INR 1.8 03/30/2021    LABA1C 5.7 12/07/2018     ASSESSMENT:  1. Severe mitral regurgitation    2. H/O mitral valve replacement    3. PAF (paroxysmal atrial fibrillation) ([facility] [address]    4. Chronic diastolic heart failure (HCC)       PLAN:  · History of Severe Mitral Regurgitation: s/p mitral valve replacement done on 10/6/2020, with 29 mm Epic valve (Bioprosthetic). Currently appears well controlled. · Beta Blocker Therapy: Decrease Metoprolol succinate (Toprol XL)  12.5 mg  daily.      · Diuretics: not indicated at this time  · I will plan to order an echocardiogram done 1 year from her mitral valve replacement which will be right around October 6, 2021 to get a good baseline of her ejection fraction and her valves. Paroxysmal Atrial Fibrillation: Rhythm Control  S/p MAZE procedure on 10/6/2020. I had another discussion about long term anticoagulation, warfarin vs Eliquis 3 months after her procedure. I told her briefly again the guidelines would still recommend she remain on full anticoagulation at least for now. Beta Blocker: DECREASE to Metoprolol succinate (Toprol XL) 25 mg 1/2 tab daily. Rhythm Control Agent: Not indicated  Stroke Risk: Her CHADS2-VASc score is 3/9 (3.2% stroke risk)   [id number] Score for Atrial Fibrillation Stroke Risk   Risk   Factors  Component Value   C CHF No 0   H HTN No 0   A2 Age >= 76 Yes,  [de-identified] y.o.) 2   D DM No 0   S2 Prior Stroke/TIA No 0   V Vascular Disease No 0   A Age 74-69 No,  [de-identified] y.o.) 0   Sc Sex female 1    [id number]  Score  3   Score last updated 5/52/70 2:35 AM EDT  Click here for a link to the UpToDate guideline \"Atrial Fibrillation: Anticoagulation therapy to prevent embolization  Disclaimer: Risk Score calculation is dependent on accuracy of patient problem list and past encounter diagnosis. Anticoagulation: Continue Warfarin. Goal INR 2-3. I also reminded her to watch for signs of blood in her stool or black tarry stools and stop the medication immediately if this develops as this could be life threatening.  Chronic diastolic heart failure: [place] Heart Association Class: IIa (Mild symptoms only in normal activities)   Beta Blocker: DECREASE to Metoprolol succinate (Toprol XL) 25 mg 1/2 tab daily.  ACE Inibitor/ARB: Not indicated at this time.  Diuretics: not indicated at this time.  Heart failure counseling: I told her to continue wearing lower extremity compression stockings and I advised them to try and keep their legs up whenever possible and to limit salt in their diet. Finally, I recommended that she continue her other medications and follow up with you as previously scheduled. FOLLOW UP:   I told Ms. Springer to call my office if she had any problems, but otherwise I asked her to Return in about 6 months (around 10/6/2021). However, I would be happy to see her sooner should the need arise. Sincerely,  Lana Gross MD, MS, F.A.C.C. [facility] Cardiology Specialist    [address] [place] [address]  Phone: [phone], Fax: [phone]     I believe that the risk of significant morbidity and mortality related to the patient's current medical conditions are: intermediate    The documentation recorded by the scribe, accurately and completely reflects the services I personally performed and the decisions made by me. Chioma Beaver MD, MS, F.A.C.C.  March 30, 2021

## 2021-03-30 NOTE — PATIENT INSTRUCTIONS
Please increase your dosing to take 1 1/2 tablet (4.5mg) on Tuesday, Thursday, Saturday and Sunday and 1 tablet (3mg ) Monday Wednesday and Friday. Continue to monitor for signs of bleeding. Return to coumadin clinic in 2 weeks.

## 2021-04-09 ENCOUNTER — TELEPHONE (OUTPATIENT)
Dept: PHARMACY | Age: 81
End: 2021-04-09

## 2021-04-09 NOTE — TELEPHONE ENCOUNTER
Recent Travel Screening and Travel History documentation:     Travel Screening     Question   Response    In the last month, have you been in contact with someone who was confirmed or suspected to have Coronavirus / COVID-19? No / Unsure    Have you had a COVID-19 viral test in the last 14 days? No    Do you have any of the following new or worsening symptoms? None of these    Have you traveled internationally or domestically in the last month?   No      Travel History   Travel since 03/09/21     No documented travel since 03/09/21

## 2021-04-12 ENCOUNTER — HOSPITAL ENCOUNTER (OUTPATIENT)
Dept: PHARMACY | Age: 81
Setting detail: THERAPIES SERIES
Discharge: HOME | End: 2021-04-12
Payer: MEDICARE

## 2021-04-12 VITALS
TEMPERATURE: 97.4 F | DIASTOLIC BLOOD PRESSURE: 59 MMHG | WEIGHT: 148 LBS | HEART RATE: 56 BPM | SYSTOLIC BLOOD PRESSURE: 104 MMHG | BODY MASS INDEX: 25.4 KG/M2

## 2021-04-12 DIAGNOSIS — I48.0 PAROXYSMAL ATRIAL FIBRILLATION (HCC): ICD-10-CM

## 2021-04-12 DIAGNOSIS — Z79.01 ENCOUNTER FOR CURRENT LONG-TERM USE OF ANTICOAGULANTS: ICD-10-CM

## 2021-04-12 LAB — INR BLD: 2.1

## 2021-04-12 PROCEDURE — 85610 PROTHROMBIN TIME: CPT

## 2021-04-12 PROCEDURE — 99211 OFF/OP EST MAY X REQ PHY/QHP: CPT

## 2021-04-12 NOTE — PROGRESS NOTES
[facility]  Medication Management  ANTICOAGULATION    Referring Doctor: Easton Caro INR: 2-3    TODAY'S INR: 2.1    WARFARIN Dosage: Continue warfarin whole 3 mg tablet MWF and 1.5 tablets for 4.5mg all other days    INR (no units)   Date Value   03/30/2021 1.8   03/03/2021 1.7   01/20/2021 1.9   12/21/2020 2   12/07/2020 1.5   11/23/2020 1.5   11/11/2020 1.7     Medication changes:  Decreased metoprolol to half tablet    Notes:    Fingerstick INR drawn per clinic protocol. Patient states no visible blood in urine and no black tarry stool. Denies any missed doses of warfarin. No change in other maintenance medications or in diet. Will recheck INR in 4 weeks. Patient acknowledges working in consult agreement with pharmacist as referred by his/her physician.                   CLINICAL PHARMACY CONSULT: MED RECONCILIATION/REVIEW ADDENDUM    For Pharmacy Admin Tracking Only    PHSO: Yes  Total # of Interventions Recommended: 1  - Decreased Dose #: 1  - Maintenance Safety Lab Monitoring #: 1  Total Interventions Accepted: 2  Time Spent (min): 15    Elbert Chapman, MaximD

## 2021-04-12 NOTE — PATIENT INSTRUCTIONS
Continue to monitor urine and stool. Continue to monitor for signs of bleeding. Return to clinic in 4 weeks.   Continue warfarin whole 3 mg tablet MWF and 1.5 tablets for 4.5mg all other days

## 2021-04-19 ENCOUNTER — TELEPHONE (OUTPATIENT)
Dept: CARDIOLOGY | Age: 81
End: 2021-04-19

## 2021-04-19 NOTE — TELEPHONE ENCOUNTER
Please let patient know that if it is mild and occasional probably not to concerned but if it gets worse or she is concerned we could do a Holter monitor to see what is going on. Thanks.

## 2021-04-19 NOTE — TELEPHONE ENCOUNTER
Patient left message stating she has had some palpitations lately. It does not last long. Didn't know if there was anything she needed to do.

## 2021-05-10 ENCOUNTER — TELEPHONE (OUTPATIENT)
Dept: PHARMACY | Age: 81
End: 2021-05-10

## 2021-05-11 ENCOUNTER — HOSPITAL ENCOUNTER (OUTPATIENT)
Dept: PHARMACY | Age: 81
Setting detail: THERAPIES SERIES
Discharge: HOME | End: 2021-05-11
Payer: MEDICARE

## 2021-05-11 VITALS
WEIGHT: 150.8 LBS | HEART RATE: 67 BPM | BODY MASS INDEX: 25.88 KG/M2 | DIASTOLIC BLOOD PRESSURE: 70 MMHG | SYSTOLIC BLOOD PRESSURE: 117 MMHG

## 2021-05-11 DIAGNOSIS — I48.0 PAROXYSMAL ATRIAL FIBRILLATION (HCC): ICD-10-CM

## 2021-05-11 DIAGNOSIS — Z79.01 ENCOUNTER FOR CURRENT LONG-TERM USE OF ANTICOAGULANTS: ICD-10-CM

## 2021-05-11 LAB — INR BLD: 2.5

## 2021-05-11 PROCEDURE — 99211 OFF/OP EST MAY X REQ PHY/QHP: CPT | Performed by: FAMILY MEDICINE

## 2021-05-11 PROCEDURE — 85610 PROTHROMBIN TIME: CPT | Performed by: FAMILY MEDICINE

## 2021-05-11 NOTE — PROGRESS NOTES
[facility] [address] [place] [address]/[place]  Medication Management  ANTICOAGULATION    Referring Provider: Dr Casey Chapin INR: 2.0-3.0    TODAY'S INR: 2.5    WARFARIN Dosage: continue 3mg MWF, 4.5mg all other days    INR (no units)   Date Value   05/11/2021 2.5   04/12/2021 2.1   03/30/2021 1.8   03/03/2021 1.7   01/20/2021 1.9   12/21/2020 2   12/07/2020 1.5       Medication changes:  No changes  Notes:    Fingerstick INR drawn per clinic protocol. Patient states no visible blood in urine and no black tarry stool. Denies any missed doses of warfarin. No change in other maintenance medications or in diet. Will recheck INR in 4 weeks. Patient acknowledges working in consult agreement with pharmacist as referred by his/her physician.                   CLINICAL PHARMACY CONSULT: MED RECONCILIATION/REVIEW ADDENDUM    For Pharmacy Admin Tracking Only       Total # of Interventions Recommended: 1   Total # of Interventions Accepted: 1   Time Spent (min): 2491 Shad Cobian

## 2021-06-11 ENCOUNTER — TELEPHONE (OUTPATIENT)
Dept: PHARMACY | Age: 81
End: 2021-06-11

## 2021-06-11 NOTE — TELEPHONE ENCOUNTER
Recent Travel Screening and Travel History documentation:     Travel Screening     Question   Response    In the last month, have you been in contact with someone who was confirmed or suspected to have Coronavirus / COVID-19? Unable to assess    Have you had a COVID-19 viral test in the last 14 days? Unable to assess    Do you have any of the following new or worsening symptoms? Unable to assess    Have you traveled internationally or domestically in the last month? Unable to assess      Travel History   Travel since 05/11/21     No documented travel since 05/11/21         Had to leave [facility].     Dre Arevalo, PharmD 6/11/2021 10:45 AM

## 2021-06-14 ENCOUNTER — HOSPITAL ENCOUNTER (OUTPATIENT)
Dept: PHARMACY | Age: 81
Setting detail: THERAPIES SERIES
Discharge: HOME | End: 2021-06-14
Payer: MEDICARE

## 2021-06-14 VITALS
WEIGHT: 148 LBS | BODY MASS INDEX: 25.4 KG/M2 | HEART RATE: 64 BPM | SYSTOLIC BLOOD PRESSURE: 107 MMHG | DIASTOLIC BLOOD PRESSURE: 64 MMHG

## 2021-06-14 DIAGNOSIS — Z79.01 ENCOUNTER FOR CURRENT LONG-TERM USE OF ANTICOAGULANTS: Primary | ICD-10-CM

## 2021-06-14 DIAGNOSIS — I48.0 PAROXYSMAL ATRIAL FIBRILLATION (HCC): ICD-10-CM

## 2021-06-14 LAB — INR BLD: 2.2

## 2021-06-14 PROCEDURE — 99211 OFF/OP EST MAY X REQ PHY/QHP: CPT

## 2021-06-14 PROCEDURE — 85610 PROTHROMBIN TIME: CPT

## 2021-06-14 NOTE — PATIENT INSTRUCTIONS
Continue current dose of warfarin as instructed on dosing calendar provided - 3 mg every Monday, Wednesday, Friday and 4.5 mg all other days. Return to clinic in 6 weeks. Continue to monitor urine and stool for signs and symptoms of bleeding. Please notify the clinic of any medication changes.

## 2021-06-14 NOTE — PROGRESS NOTES
[facility] [address] [place] [address]/[place]  Medication Management  ANTICOAGULATION    Referring Provider: Yaa Ocampo INR: 2-3    TODAY'S INR: 2.2    WARFARIN Dosage: Continue warfarin 3 mg every MWF; 4.5 mg all other days    INR (no units)   Date Value   06/14/2021 2.2   05/11/2021 2.5   04/12/2021 2.1   03/30/2021 1.8   03/03/2021 1.7   01/20/2021 1.9   12/21/2020 2       Notes:    Fingerstick INR drawn per clinic protocol. Patient states no visible blood in urine and no black tarry stool. Denies any missed doses of warfarin. No change in other maintenance medications or in diet. Will recheck INR in 6 weeks. Patient acknowledges working in consult agreement with pharmacist as referred by his/her physician. CLINICAL PHARMACY CONSULT: MED RECONCILIATION/REVIEW ADDENDUM    For Pharmacy Admin Tracking Only     Total # of Interventions Recommended: 0   Total # of Interventions Accepted: 0   Time Spent (min): Eben.  [place], [facility]

## 2021-06-28 ENCOUNTER — ANTI-COAG VISIT (OUTPATIENT)
Dept: PHARMACY | Age: 81
End: 2021-06-28

## 2021-06-28 DIAGNOSIS — Z79.01 ENCOUNTER FOR CURRENT LONG-TERM USE OF ANTICOAGULANTS: Primary | ICD-10-CM

## 2021-06-28 DIAGNOSIS — I48.0 PAROXYSMAL ATRIAL FIBRILLATION (HCC): ICD-10-CM

## 2021-06-28 NOTE — PROGRESS NOTES
Patient called requesting refill on her warfarin. Referral states 6 months of therapy after MAZE procedure and tissue mitral valve (10-9-20). Patient has A-fib now and last Dr Montez Sinclair note states to continue warfarin. Called new RX into [facility] for warfarin 3 mg tablets take whole tablet MWF and 1.5 tablets for 4.5 mg all other days with 90 day supply and 3 refills per Dr Larissa Everett.   Sahil Schaffer, PharmD 6/28/2021 9:03 AM

## 2021-07-13 PROBLEM — M75.41 ROTATOR CUFF IMPINGEMENT SYNDROME OF RIGHT SHOULDER: Status: ACTIVE | Noted: 2021-07-13

## 2021-07-27 ENCOUNTER — TELEPHONE (OUTPATIENT)
Dept: PHARMACY | Age: 81
End: 2021-07-27

## 2021-07-27 NOTE — TELEPHONE ENCOUNTER
Recent Travel Screening and Travel History documentation:     Travel Screening     Question   Response    In the last month, have you been in contact with someone who was confirmed or suspected to have Coronavirus / COVID-19? Unable to assess    Have you had a COVID-19 viral test in the last 14 days? Unable to assess    Do you have any of the following new or worsening symptoms? Unable to assess    Have you traveled internationally or domestically in the last month? Unable to assess      Travel History   Travel since 06/27/21     No documented travel since 06/27/21         Had to leave [facility].     Avery Kilpatrick, PharmD 7/27/2021 11:41 AM

## 2021-07-28 ENCOUNTER — HOSPITAL ENCOUNTER (OUTPATIENT)
Dept: PHARMACY | Age: 81
Setting detail: THERAPIES SERIES
Discharge: HOME | End: 2021-07-28
Payer: MEDICARE

## 2021-07-28 VITALS
DIASTOLIC BLOOD PRESSURE: 64 MMHG | HEART RATE: 55 BPM | BODY MASS INDEX: 25.06 KG/M2 | WEIGHT: 146 LBS | SYSTOLIC BLOOD PRESSURE: 97 MMHG

## 2021-07-28 DIAGNOSIS — Z79.01 ENCOUNTER FOR CURRENT LONG-TERM USE OF ANTICOAGULANTS: Primary | ICD-10-CM

## 2021-07-28 DIAGNOSIS — I48.0 PAROXYSMAL ATRIAL FIBRILLATION (HCC): ICD-10-CM

## 2021-07-28 LAB — INR BLD: 1.6

## 2021-07-28 PROCEDURE — 99212 OFFICE O/P EST SF 10 MIN: CPT

## 2021-07-28 PROCEDURE — 85610 PROTHROMBIN TIME: CPT

## 2021-07-28 NOTE — PROGRESS NOTES
[facility]/[place]  Medication Management  ANTICOAGULATION    Referring Provider: Papi Bukrs INR: 2-3    TODAY'S INR: 1.6    WARFARIN Dosage: Increase warfarin to 3 mg po every Monday and Friday; 4.5 mg po all other days    INR (no units)   Date Value   07/28/2021 1.6   06/14/2021 2.2   05/11/2021 2.5   04/12/2021 2.1   03/30/2021 1.8   03/03/2021 1.7   01/20/2021 1.9       Notes:    Fingerstick INR drawn per clinic protocol. Patient states no visible blood in urine and no black tarry stool. Denies any missed doses of warfarin. No change in other maintenance medications or in diet. Will recheck INR in 2 weeks. Patient acknowledges working in consult agreement with pharmacist as referred by his/her physician. For Pharmacy Admin Tracking Only     Intervention Detail: Dose Adjustment: 1, reason: Therapy Optimization   Total # of Interventions Recommended: 1   Total # of Interventions Accepted: 1   Time Spent (min): [address]  Maryana Hurtado [facility] The patient location is: [facility]  The chief complaint leading to consultation is: covid   Visit type: Virtual visit with synchronous audio and video    Each patient to whom he or she provides medical services by telemedicine is:  (1) informed of the relationship between the physician and patient and the respective role of any other health care provider with respect to management of the patient; and (2) notified that he or she may decline to receive medical services by telemedicine and may withdraw from such care at any time.        CHIEF COMPLAINT:  follow up of hospitalization for COVID.     HISTORY OF PRESENT ILLNESS: 76-year-old woman presents for hosptial follow up for COVID.     In the last week, she has continued to improve. Today she feels much better. She has not needed oxygen in 2 days. Sats are 97%. She gets slightly winded upon ambulation. She does not feel ligtheaded. Physical therapy came yesterday and did her exercises with her.  Breathing is much improved. No cough.  Appetite is better.    Right hip pain is better- she is taking indomethacin 25 mg once daily for gout. Heating pad has helped. She continues allopurinol 200 mg daily.     Anxiety is getting better since increasing Lexapro to 10 mg one tablet daily. She is sleeping ebtter with xanax 0.25 mg 1/2 tablet at bedtime.  She is still afraid to go out. She has not left the house.    Bowels are moving well with Linzess once daily. No nausea, vomiting, constipation, diarrhea.      Anxiety is much worse. She is afraid to go to sleep fearing she would die.  She is taking lexapro 10 mg 1/2 tablet daily. Her family gave her xanax 0.25 mg 1/2 tablet at bedtime the last several nights and she slept better.      She had heartburn once yesterday after eating cheesecake from [place]. She took omeprazole 20 mg yesterday.       She continues to take Crestor 20 mg daily with co enzyme 10 200 mg daily, losartan 50 mg twice daily. BP has been doing  well.              She had a normal cystoscope 14 due to recurrent UTI. No dysuria or hematuria now. She is no longer using estrogen vaginal cream for vaginal atrophy three times weekly       She is taking vitamin D 50,000 units twice weekly     Colonoscopy was cancelled on 3/30/20 due to COVID pandemic.      No dysuria or hematuria.     PAST MEDICAL HISTORY:   1. Hypertension.   2. Hyperlipidemia.   3. Gout.   4. History of rashes.   5. TIA in   6. Fibrocystic breast disease.   7. Postmenopausal.   8. Osteoarthritis.   9. History of tobacco usage; quit in .   10. History of colon polyps; normal colonoscopy in 10/07 and normal 2010, due    11. Normal bone mineral density scan in .   12. Anxiety and depression - controlled on lorazepam very rarely.   13. Varicose veins.   14. Intermittent GERD.   15. Iron deficiency anemia 2010 - due to erosive gastropathy on EGD 2010     PAST SURGICAL HISTORY:   1. Right carotid endarterectomy in .   2. Breast reduction surgery.   3. Total abdominal hysterectomy.   4. Blood transfusion prior to .     SOCIAL HISTORY: Quit smoking in ; smoked 1 PPD since age 18. No   alcohol use.     FAMILY HISTORY:   Father had gout, hypertension and heart failure; is . Mother had hypertension and pancreatic cancer; is also . Has five brothers, one of whom  in a motor vehicle accident. All have gout and hypertension. One brother has Crohn's disease; another has bladder cancer.     MEDICATIONS and ALLERGIES: Updated on epic.     PHYSICAL EXAMINATION:           General: Alert, oriented. No apparent distress. Respiratory effort normal. Speech clear.    No oxgyen tubing. Color is better. Not weak appering. Looks good     ASSESSMENT AND PLAN:   1.  COVID infection -  improving.  2. Essential hypertension - stable in losartan 50 mg twice daily  3. Heme positive stools - saw Dr Kingsley 19 - colonoscopy will need to be rescheduled. .  4.  Hyperlipidemia - On crestor 20 mg daily and Co Enzyme Q 10 200 mg daily.  Refilled  5. Erosive gastropathy -  on omeprazole 20 mg daily. Off NSAIDS.  6. Hypercalcemia - stable  7. Gout -  on allopurinol. Hip is better  8. Situational stress/anxiety/mood disorder -better with increasing lexapro to 10 mg 1 tablet daily. Xanax 0.25 mg 1/2-1 at bedtime.   8.  Carotid artery disease - on risk factor modification.   9. 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 Constipation -stable on linzess  Screening - Colonscopy 10/15 with 4 polyps - it was incomplete. Repeat 5/26/16 - divericulosis otherwise normal - . MMG 4/19 BMD 7/11.  I will see her back in 2 week via virtual visit , sooner if problems arise

## 2021-08-04 ENCOUNTER — ANTI-COAG VISIT (OUTPATIENT)
Dept: PHARMACY | Age: 81
End: 2021-08-04

## 2021-08-04 DIAGNOSIS — I48.0 PAROXYSMAL ATRIAL FIBRILLATION (HCC): ICD-10-CM

## 2021-08-04 DIAGNOSIS — Z79.01 ENCOUNTER FOR CURRENT LONG-TERM USE OF ANTICOAGULANTS: Primary | ICD-10-CM

## 2021-08-10 ENCOUNTER — TELEPHONE (OUTPATIENT)
Dept: PHARMACY | Age: 81
End: 2021-08-10

## 2021-08-10 NOTE — TELEPHONE ENCOUNTER
Recent Travel Screening and Travel History documentation:     Travel Screening     Question   Response    In the last month, have you been in contact with someone who was confirmed or suspected to have Coronavirus / COVID-19? No / Unsure    Have you had a COVID-19 viral test in the last 14 days? No    Do you have any of the following new or worsening symptoms? None of these    Have you traveled internationally or domestically in the last month?   No      Travel History   Travel since 07/10/21     No documented travel since 07/10/21

## 2021-08-11 ENCOUNTER — HOSPITAL ENCOUNTER (OUTPATIENT)
Dept: PHARMACY | Age: 81
Setting detail: THERAPIES SERIES
Discharge: HOME | End: 2021-08-11
Payer: MEDICARE

## 2021-08-11 VITALS
SYSTOLIC BLOOD PRESSURE: 88 MMHG | BODY MASS INDEX: 25.23 KG/M2 | HEART RATE: 66 BPM | DIASTOLIC BLOOD PRESSURE: 54 MMHG | WEIGHT: 147 LBS

## 2021-08-11 DIAGNOSIS — Z79.01 ENCOUNTER FOR CURRENT LONG-TERM USE OF ANTICOAGULANTS: Primary | ICD-10-CM

## 2021-08-11 DIAGNOSIS — I48.0 PAROXYSMAL ATRIAL FIBRILLATION (HCC): ICD-10-CM

## 2021-08-11 LAB — INR BLD: 2

## 2021-08-11 PROCEDURE — 99211 OFF/OP EST MAY X REQ PHY/QHP: CPT

## 2021-08-11 PROCEDURE — 85610 PROTHROMBIN TIME: CPT

## 2021-08-11 NOTE — PROGRESS NOTES
Micheal [facility]/[place]  Medication Management  ANTICOAGULATION    Referring Provider: Gracia Lau     GOAL INR: 2-3     TODAY'S INR: 1.6     WARFARIN Dosage: Continue warfarin whole 3 mg tablet MF and 1.5 tablets for 4.5 mg all other days. INR (no units)   Date Value   2021 1.6   2021 2.2   2021 2.5   2021 2.1   2021 1.8   2021 1.7   2021 1.9     Medication changes:  None    Notes:    Fingerstick INR drawn per clinic protocol. Patient states no visible blood in urine and no black tarry stool. Denies any missed doses of warfarin. No change in other maintenance medications or in diet. Will recheck INR in 4 weeks. Patient acknowledges working in consult agreement with pharmacist as referred by his/her physician.                   For Pharmacy Admin Tracking Only     Intervention Detail: Adherence Monitorin   Total # of Interventions Recommended: 0   Total # of Interventions Accepted: 0   Time Spent (min): 2020 [facility] [address] [place] [address], PharmD

## 2021-08-11 NOTE — PATIENT INSTRUCTIONS
Continue to monitor urine and stool. Continue to monitor for signs of bleeding. Return to clinic in 4 weeks. Continue warfarin whole 3 mg tablet MF and 1.5 tablets for 4.5 mg all other days.

## 2021-09-07 ENCOUNTER — TELEPHONE (OUTPATIENT)
Dept: PHARMACY | Age: 81
End: 2021-09-07

## 2021-09-07 NOTE — TELEPHONE ENCOUNTER
Recent Travel Screening and Travel History documentation:     Travel Screening     Question   Response    In the last month, have you been in contact with someone who was confirmed or suspected to have Coronavirus / COVID-19? No / Unsure    Have you had a COVID-19 viral test in the last 14 days? No    Do you have any of the following new or worsening symptoms? None of these    Have you traveled internationally or domestically in the last month?   No      Travel History   Travel since 08/07/21     No documented travel since 08/07/21

## 2021-09-08 ENCOUNTER — HOSPITAL ENCOUNTER (OUTPATIENT)
Dept: PHARMACY | Age: 81
Setting detail: THERAPIES SERIES
Discharge: HOME | End: 2021-09-08
Payer: MEDICARE

## 2021-09-08 VITALS
DIASTOLIC BLOOD PRESSURE: 67 MMHG | BODY MASS INDEX: 25.58 KG/M2 | WEIGHT: 149 LBS | SYSTOLIC BLOOD PRESSURE: 115 MMHG | HEART RATE: 57 BPM

## 2021-09-08 DIAGNOSIS — Z79.01 ENCOUNTER FOR CURRENT LONG-TERM USE OF ANTICOAGULANTS: Primary | ICD-10-CM

## 2021-09-08 DIAGNOSIS — I48.0 PAROXYSMAL ATRIAL FIBRILLATION (HCC): ICD-10-CM

## 2021-09-08 LAB — INR BLD: 1.8

## 2021-09-08 PROCEDURE — 99212 OFFICE O/P EST SF 10 MIN: CPT

## 2021-09-08 PROCEDURE — 85610 PROTHROMBIN TIME: CPT

## 2021-09-08 RX ORDER — MULTIVIT WITH MINERALS/LUTEIN
1000 TABLET ORAL DAILY
COMMUNITY
End: 2022-05-04 | Stop reason: ALTCHOICE

## 2021-09-08 NOTE — PROGRESS NOTES
Katelynalma rosa [facility]/[place]  Medication Management  ANTICOAGULATION    Referring Provider: Mario Vega INR: 2-3    TODAY'S INR: 1.8    WARFARIN Dosage: Increase warfarin to 3 mg po every Friday; 4.5 mg po all other days  Patient will take warfarin 6 mg po today for 1 dose    INR (no units)   Date Value   09/08/2021 1.8   08/11/2021 2   07/28/2021 1.6   06/14/2021 2.2   05/11/2021 2.5   04/12/2021 2.1   03/30/2021 1.8       Medication changes:  Patient will complete prednisone taper taper today as prescribed per ortho    Notes:    Fingerstick INR drawn per clinic protocol. Patient states no visible blood in urine and no black tarry stool. Denies any missed doses of warfarin. No change in other maintenance medications or in diet. Will recheck INR in 3 weeks. Patient acknowledges working in consult agreement with pharmacist as referred by his/her physician. For Pharmacy Admin Tracking Only     Intervention Detail: Dose Adjustment: 2, reason: Therapy Optimization   Total # of Interventions Recommended: 2   Total # of Interventions Accepted: 2   Time Spent (min): [address]  Mickie Rich, [address] [facility]

## 2021-09-08 NOTE — PATIENT INSTRUCTIONS
Please take warfarin 6 mg today. Increase current dose of warfarin as instructed on dosing calendar provided - 3 mg every Friday and 4.5 mg all other days. Return to clinic in 3 weeks. Continue to monitor urine and stool for signs and symptoms of bleeding. Please notify the clinic of any medication changes.

## 2021-09-27 ENCOUNTER — TELEPHONE (OUTPATIENT)
Dept: CARDIOLOGY | Age: 81
End: 2021-09-27

## 2021-09-27 NOTE — TELEPHONE ENCOUNTER
Pt called and wanted to know if you wanted and Lab work or any testing done before her ap pt on 10/19?

## 2021-09-28 ENCOUNTER — TELEPHONE (OUTPATIENT)
Dept: PHARMACY | Age: 81
End: 2021-09-28

## 2021-09-29 ENCOUNTER — HOSPITAL ENCOUNTER (OUTPATIENT)
Dept: PHARMACY | Age: 81
Setting detail: THERAPIES SERIES
Discharge: HOME | End: 2021-09-29
Payer: MEDICARE

## 2021-09-29 VITALS
SYSTOLIC BLOOD PRESSURE: 120 MMHG | DIASTOLIC BLOOD PRESSURE: 67 MMHG | WEIGHT: 151 LBS | HEART RATE: 58 BPM | BODY MASS INDEX: 25.92 KG/M2

## 2021-09-29 DIAGNOSIS — Z79.01 ENCOUNTER FOR CURRENT LONG-TERM USE OF ANTICOAGULANTS: Primary | ICD-10-CM

## 2021-09-29 DIAGNOSIS — I48.0 PAROXYSMAL ATRIAL FIBRILLATION (HCC): ICD-10-CM

## 2021-09-29 LAB — INR BLD: 2.3

## 2021-09-29 PROCEDURE — 99211 OFF/OP EST MAY X REQ PHY/QHP: CPT

## 2021-09-29 PROCEDURE — 85610 PROTHROMBIN TIME: CPT

## 2021-09-29 NOTE — PROGRESS NOTES
Micheal [facility]/[place]  Medication Management  ANTICOAGULATION    Referring Provider: Matt Astorga INR: 2-3    TODAY'S INR: 2.3    WARFARIN Dosage: Continue 3 mg po every Friday; 4.5 mg po all other days    INR (no units)   Date Value   2021 2.3   2021 1.8   2021 2   2021 1.6   2021 2.2   2021 2.5   2021 2.1       Medication changes:  Stopped taking Zinc (OTC)    Notes:    Fingerstick INR drawn per clinic protocol. Patient states no visible blood in urine and no black tarry stool. Denies any missed doses of warfarin. No change in other maintenance medications or in diet. Will recheck INR in 5 weeks. Patient acknowledges working in consult agreement with pharmacist as referred by his/her physician. For Pharmacy Admin Tracking Only     Intervention Detail: Adherence Monitorin   Total # of Interventions Recommended: 0   Total # of Interventions Accepted: 0   Time Spent (min): [address] [facility] [address] [facility] [address]  Yumiko Quintana, [facility]

## 2021-10-19 ENCOUNTER — OFFICE VISIT (OUTPATIENT)
Dept: CARDIOLOGY | Age: 81
End: 2021-10-19
Payer: MEDICARE

## 2021-10-19 VITALS
HEIGHT: 64 IN | HEART RATE: 52 BPM | DIASTOLIC BLOOD PRESSURE: 65 MMHG | WEIGHT: 145 LBS | SYSTOLIC BLOOD PRESSURE: 114 MMHG | OXYGEN SATURATION: 95 % | BODY MASS INDEX: 24.75 KG/M2 | RESPIRATION RATE: 18 BRPM

## 2021-10-19 DIAGNOSIS — I34.0 SEVERE MITRAL REGURGITATION: ICD-10-CM

## 2021-10-19 DIAGNOSIS — I50.32 CHRONIC DIASTOLIC HEART FAILURE (HCC): Primary | ICD-10-CM

## 2021-10-19 DIAGNOSIS — Z79.01 ENCOUNTER FOR CURRENT LONG-TERM USE OF ANTICOAGULANTS: ICD-10-CM

## 2021-10-19 DIAGNOSIS — I48.0 PAF (PAROXYSMAL ATRIAL FIBRILLATION) (HCC): ICD-10-CM

## 2021-10-19 DIAGNOSIS — Z95.2 S/P MITRAL VALVE REPLACEMENT: ICD-10-CM

## 2021-10-19 PROCEDURE — 99214 OFFICE O/P EST MOD 30 MIN: CPT | Performed by: FAMILY MEDICINE

## 2021-10-19 NOTE — PROGRESS NOTES
Remington Chambers RN am scribing for and in the presence of King Andrea Gross MD, MS, F.A.C.C..      Patient: Chapincito Chan  : 1940  Date of Visit: 2021    REASON FOR VISIT / CONSULTATION: Follow-up (Hx: severe mitral regurg, hx of mitral valve replacement, PAF, CHF. pt is here for [facility]. doing okay. SOb sometimes when walking and going up stairs. stable. palps only mild. she has had a terrible week wtih heartburn, takes prilosec not helping this week, eating tums. happening randomly. Denies: CP, dizziness, lightheaded) and Other (today she started having vision distortion lasted a couple minutes, no headache with it. )      Dear Ren Hermosillo MD,    I had the pleasure of seeing Chapincito Chan today. Ms. Jocelyn Nielsen is a 80 y.o. female with a history of paroxysmal atrial fibrillation. She reports that this 1st occurred a few year ago and most recently occurred first part of February  but she says she has always had some degree of palpitations. Her last previous sustained event was about a year ago. She also has a history of mitral valve prolapse but denies any heart attacks, other cardiac issues or stroke or mini stroke. She underwent a cardiovascular stress test on 2018 that had show to be consistent with low to intermediate risk of coronary artery disease. Her cardiac catheterization on 2018 had shown mild irregularities with the worst being in her LAD at 20-30%. She was also admitted to the hospital for tikosyn loading on 2018 and has been maintained on this ever since with good results. She had an Echo done on 2020 that showed the left atrium that is severely dilated (>40) with a left atrial volume index of 76 ml/m2. Bileaflet prolapse with more prominent P2 prolapse and moderate to severe mitral regurgitation. Moderate tricuspid regurgitation. Mild pulmonary hypertension with estimated pulmonary artery systolic pressure of 33 mmHg. Moderate diastolic dysfunction.  Her EF was 60%. Mitral valve replacement done at [facility] using 29 mm Epic Bioprosthetic valve on 10/06/2020. MAZE procedure done on 10/06/2020 as well as left atrial clipping. Ms. Sean Loredo reports that she is doing ok but says that she has been having increased heart burn after eating greasy foods and says her right side hurts at that point and did start eating bland food to help with this. She denied any chest pain or discomfort, lightheadedness/dizziness, abdominal pain, bleeding problems, problems with her medications or any other concerns at this time. Other than she did stop taking asa for a couple of days because of acid reflux. She denies any known repeat episodes of atrial fibrillation or palpitations. She denies any lightheadedness or dizziness. Weight has stayed the same since last visit   Wt Readings from Last 3 Encounters:   10/19/21 145 lb (65.8 kg)   09/29/21 151 lb (68.5 kg)   09/08/21 149 lb (67.6 kg)      Exercise Tolerance: Ms. Springer reports that she has a fairly good exercise tolerance. Her says that she walks about 20 minutes without any problems. Past Medical History:   Diagnosis Date    Cancer [facility])     CHF [facility]congestive heart failure) [facility]     Essential hypertension     Fracture of sacrum ([facility] [address] 2011    GERD (gastroesophageal reflux disease)     H/O cardiovascular stress test 02/28/2018    Equivocal myocardial perfusion study. There is a small/moderate perufsion defect of mild intensity in the apical an anteroapical regions during stress imaging which is most consistent with artifact but may be due to a small degree of coronary ischemia. Overall, these results are most consistent with a low/intermediate risk for CAD.     H/O echocardiogram 02/02/2018    EF 60%. Mildly increased left ventricular wall thickness. No definite specific wall motion abnormalities. Bi-atrial enlargment.  Myxomatous thickening of the mitral leaflets with bileaflet prolapse and moderat mitral regurg. Moderate tricuspid regurg. Mild pulmonary HTN with an estimated right ventricualr systolic pressure [id number].  History of Holter monitoring 2016    PACs & PVCs    Irritable bowel syndrome     Mitral valve prolapse     Osteoarthritis     Osteopenia     Paroxysmal atrial fibrillation (HCC) 2018    Rosacea     Tendinitis of left shoulder        CURRENT ALLERGIES: Butalbital-aspirin-caffeine and Other REVIEW OF SYSTEMS: 14 systems were reviewed. Pertinent positives and negatives as above, all else negative.      Past Surgical History:   Procedure Laterality Date    BUNIONECTOMY      CARDIAC CATHETERIZATION Left 07/02/2020    DR Gross/[facility]/right radial-    COLONOSCOPY  2008    CYSTOCELE REPAIR      ENDOSCOPY, COLON, DIAGNOSTIC      EYE SURGERY      HERNIA REPAIR      HYSTERECTOMY      MOHS SURGERY  2008    SKIN BIOPSY      LUBA AND BSO Bilateral 2014    TRANSESOPHAGEAL ECHOCARDIOGRAM  07/02/2020    Dr Deloris Telles/Unsuccessful attempt    TRANSESOPHAGEAL ECHOCARDIOGRAM  07/09/2020    Dr Florencia Telles/Failed, anesthesia sedate    UPPER GASTROINTESTINAL ENDOSCOPY N/A 12/10/2019    -antral erythema,bx(mild chronic inactive gastritis with focal intestinal metaplasia,neg H-Pylori)dilation    UPPER GASTROINTESTINAL ENDOSCOPY  12/10/2019    EGD DILATION SAVORY performed by Sindy Eller MD at [place] [address]  2003    Sling    VASCULAR SURGERY      VEIN SURGERY      Social History:  Social History     Tobacco Use    Smoking status: Never Smoker    Smokeless tobacco: Never Used   Vaping Use    Vaping Use: Never used   Substance Use Topics    Alcohol use: Never     Comment: occasionally    Drug use: No        CURRENT MEDICATIONS:  Outpatient Medications Marked as Taking for the 10/19/21 encounter (Office Visit) with Radha Muniz MD   Medication Sig Dispense Refill    Ascorbic Acid (VITAMIN C) 1000 MG tablet Take 1,000 mg by mouth daily      tiZANidine (ZANAFLEX) 4 MG tablet Take 1 tablet by mouth nightly as needed (Neck pain) 30 tablet 1    omeprazole (PRILOSEC) 40 MG delayed release capsule Take 1 capsule by mouth daily 90 capsule 2    metoprolol succinate (TOPROL XL) 25 MG extended release tablet Take 0.5 tablets by mouth daily 45 tablet 3    cycloSPORINE (RESTASIS MULTIDOSE) 0.05 % ophthalmic emulsion Place 1 drop into both eyes 2 times daily      carboxymethylcellulose 1 % ophthalmic solution Place 1 drop into both eyes 3 times daily as needed for Dry Eyes      warfarin (COUMADIN) 3 MG tablet Take 1 tablet by mouth daily As directed by [facility]madin Clinic   Half tablet for 1.5 mg on Sundays and whole 3 mg tablet all other days. (Patient taking differently: Take 3 mg by mouth See Admin Instructions Coumadin Clinic - 3 mg on Friday and 4.5 mg tablet all other days.) 90 tablet 3    polyethylene glycol (GLYCOLAX) 17 g packet Take 17 g by mouth daily as needed for Constipation       aspirin EC 81 MG EC tablet Take 1 tablet by mouth daily 90 tablet 3    vitamin D (CHOLECALCIFEROL) 1000 UNIT TABS tablet Take 1,000 Units by mouth daily       docusate sodium (COLACE) 100 MG capsule Take 100 mg by mouth daily       Magnesium 400 MG TABS Take 1 tablet by mouth daily          FAMILY HISTORY: family history includes Arthritis in her sister and sister; Atrial Fibrillation in her sister; Diabetes in her brother and mother; Heart Attack in her brother; Heart Disease in her mother; High Blood Pressure in her father. PHYSICAL EXAM:   /65 (Site: Right Upper Arm, Position: Sitting, Cuff Size: Medium Adult)   Pulse 52   Resp 18   Ht 5' 4.02\" (1.626 m)   Wt 145 lb (65.8 kg)   SpO2 95%   BMI 24.88 kg/m²  Body mass index is 24.88 kg/m². Constitutional: She is oriented to person, place, and time. She appears well-developed and well-nourished. In no acute distress. HEENT: Normocephalic and atraumatic. No JVD present.  Carotid bruit is not present. No mass and no thyromegaly present. No lymphadenopathy present. Cardiovascular: Bradycardic rate, regular rhythm, normal heart sounds. Exam reveals no gallop and no friction rubs. 2/6 systolic murmur, 5th intercostal space on the LEFT in the mid-clavicular line (cardiac apex). Pulmonary/Chest: Effort normal and breath sounds normal. No respiratory distress. She has no wheezes, rhonchi or rales. Abdominal: Soft, non-tender. Bowel sounds and aorta are normal. She exhibits no organomegaly, mass or bruit. Extremities:  Trace lower extremity edema. No cyanosis and no clubbing. Pulses are 2+ radial and carotid pulses. 2+ dorsalis pedis and posterior tibial pulses bilaterally. Compression stockings in place. Neurological: She is alert and oriented to person. No evidence of gross cranial nerve deficit. Coordination appeared normal.   Skin: Skin is warm and dry. There is no rash or diaphoresis. Psychiatric: She has a normal mood and affect. Her speech is normal and behavior is normal.      MOST RECENT LABS ON RECORD:   Lab Results   Component Value Date    WBC 4.9 11/10/2020    HGB 11.2 (L) 11/10/2020    HCT 35.9 (L) 11/10/2020     11/10/2020    CHOL 180 05/11/2019    TRIG 39 05/11/2019    HDL 67 05/11/2019    ALT 20 12/07/2018    AST 26 12/07/2018     11/10/2020    K 4.3 11/10/2020     11/10/2020    CREATININE 0.59 11/10/2020    BUN 12 11/10/2020    CO2 27 11/10/2020    TSH 2.46 04/24/2018    INR 2.3 09/29/2021    LABA1C 5.7 12/07/2018     ASSESSMENT:  1. Chronic diastolic heart failure ([address]    2. Severe mitral regurgitation    3. S/P mitral valve replacement    4. PAF (paroxysmal atrial fibrillation) ([address]    5. Encounter for current long-term use of anticoagulants       PLAN:  · History of Severe Mitral Regurgitation: s/p mitral valve replacement done on 10/6/2020, with 29 mm Epic valve (Bioprosthetic). Currently appears well controlled.   · Beta Blocker Therapy: Continue Metoprolol succinate (Toprol XL)  12.5 mg  daily. · Diuretics: not indicated at this time  · Additional Testing: I Ordered an Echocardiogram to assess Ms. Springer's ejection fraction and to look for significant valvular heart disease as a source of Ms. Springer symptoms    Paroxysmal Atrial Fibrillation: Rhythm Control  S/p MAZE procedure on 10/6/2020. Beta Blocker: Continue Metoprolol succinate (Toprol XL) 25 mg 1/2 tab daily. Rhythm Control Agent: Not indicated  Stroke Risk: Her CHADS2-VASc score is 3/9 (3.2% stroke risk)   [id number] Score for Atrial Fibrillation Stroke Risk   Risk   Factors  Component Value   C CHF No 0   H HTN No 0   A2 Age >= 76 Yes,  (80 y.o.) 2   D DM No 0   S2 Prior Stroke/TIA No 0   V Vascular Disease No 0   A Age 74-69 No,  (80 y.o.) 0   Sc Sex female 1    [id number]  Score  3   Score last updated 9/80/15 6:78 AM EDT  Click here for a link to the UpToDate guideline \"Atrial Fibrillation: Anticoagulation therapy to prevent embolization  Disclaimer: Risk Score calculation is dependent on accuracy of patient problem list and past encounter diagnosis. Anticoagulation: Continue Warfarin. Goal INR 2-3. I will continue with this anticoagulation but may re-address switching her to Pradaxa or eliquis at her next appointment. I also reminded her to watch for signs of blood in her stool or black tarry stools and stop the medication immediately if this develops as this could be life threatening.  Chronic diastolic heart failure: New York Heart Association Class: IIa (Mild symptoms only in normal activities)   Beta Blocker: Continue Metoprolol succinate (Toprol XL) 25 mg 1/2 tab daily.  ACE Inibitor/ARB: Not indicated at this time.  Diuretics: not indicated at this time.  Heart failure counseling: I told her to continue wearing lower extremity compression stockings and I advised them to try and keep their legs up whenever possible and to limit salt in their diet.     Finally, I recommended that she continue her other medications and follow up with you as previously scheduled. FOLLOW UP:   I told Ms. Springer to call my office if she had any problems, but otherwise I asked her to Return in about 6 months (around 4/19/2022). However, I would be happy to see her sooner should the need arise. Sincerely,  Chano Aguilar. Renato MARTIN, MS, F.A.C.C. [facility] Cardiology Specialist    [address] [place] [address], [address] [place] [address]  Phone: [phone], Fax: [phone]     I believe that the risk of significant morbidity and mortality related to the patient's current medical conditions are: intermediate    The documentation recorded by the scribe, accurately and completely reflects the services I personally performed and the decisions made by me. Sd Connors MD, MS, F.A.C.C.  October 19, 2021

## 2021-10-19 NOTE — PATIENT INSTRUCTIONS
SURVEY:    You may be receiving a survey from [employer] [place] regarding your visit today. Please complete the survey to enable us to provide the highest quality of care to you and your family. If you cannot score us a very good on any question, please call the office to discuss how we could have made your experience a very good one. Thank you.

## 2021-10-21 ENCOUNTER — HOSPITAL ENCOUNTER (OUTPATIENT)
Age: 81
Discharge: HOME | End: 2021-10-21
Payer: MEDICARE

## 2021-10-21 ENCOUNTER — TELEPHONE (OUTPATIENT)
Dept: CARDIOLOGY | Age: 81
End: 2021-10-21

## 2021-10-21 DIAGNOSIS — I34.0 SEVERE MITRAL REGURGITATION: ICD-10-CM

## 2021-10-21 DIAGNOSIS — I48.0 PAF (PAROXYSMAL ATRIAL FIBRILLATION) (HCC): ICD-10-CM

## 2021-10-21 DIAGNOSIS — Z95.2 S/P MITRAL VALVE REPLACEMENT: ICD-10-CM

## 2021-10-21 LAB
ANION GAP SERPL CALCULATED.3IONS-SCNC: 9 MMOL/L (ref 9–17)
BUN BLDV-MCNC: 10 MG/DL (ref 8–23)
BUN/CREAT BLD: 14 (ref 9–20)
CALCIUM SERPL-MCNC: 9.3 MG/DL (ref 8.6–10.4)
CHLORIDE BLD-SCNC: 102 MMOL/L (ref 98–107)
CO2: 28 MMOL/L (ref 20–31)
CREAT SERPL-MCNC: 0.71 MG/DL (ref 0.5–0.9)
GFR AFRICAN AMERICAN: >60 ML/MIN
GFR NON-AFRICAN AMERICAN: >60 ML/MIN
GFR SERPL CREATININE-BSD FRML MDRD: NORMAL ML/MIN/{1.73_M2}
GFR SERPL CREATININE-BSD FRML MDRD: NORMAL ML/MIN/{1.73_M2}
GLUCOSE BLD-MCNC: 93 MG/DL (ref 70–99)
HCT VFR BLD CALC: 40 % (ref 36.3–47.1)
HEMOGLOBIN: 12.7 G/DL (ref 11.9–15.1)
MCH RBC QN AUTO: 33.3 PG (ref 25.2–33.5)
MCHC RBC AUTO-ENTMCNC: 31.8 G/DL (ref 28.4–34.8)
MCV RBC AUTO: 105 FL (ref 82.6–102.9)
NRBC AUTOMATED: 0 PER 100 WBC
PDW BLD-RTO: 13.2 % (ref 11.8–14.4)
PLATELET # BLD: 169 K/UL (ref 138–453)
PMV BLD AUTO: 9.2 FL (ref 8.1–13.5)
POTASSIUM SERPL-SCNC: 4.4 MMOL/L (ref 3.7–5.3)
RBC # BLD: 3.81 M/UL (ref 3.95–5.11)
SODIUM BLD-SCNC: 139 MMOL/L (ref 135–144)
WBC # BLD: 3.5 K/UL (ref 3.5–11.3)

## 2021-10-21 PROCEDURE — 36415 COLL VENOUS BLD VENIPUNCTURE: CPT

## 2021-10-21 PROCEDURE — 85027 COMPLETE CBC AUTOMATED: CPT

## 2021-10-21 PROCEDURE — 80048 BASIC METABOLIC PNL TOTAL CA: CPT

## 2021-10-21 NOTE — TELEPHONE ENCOUNTER
----- Message from Juancarlos Costa MD sent at 10/21/2021 10:39 AM EDT -----  Let Ms. Springer know their test result was ok. Will discuss at next visit. Thanks.

## 2021-11-02 ENCOUNTER — TELEPHONE (OUTPATIENT)
Dept: PHARMACY | Age: 81
End: 2021-11-02

## 2021-11-02 NOTE — TELEPHONE ENCOUNTER
COVID-19 phone screening     Call placed to screen patient prior to upcoming Medication Management visit for Anticoagulation on 11/3/21. Does patient have any of the following symptoms? [] Fever    [] Lower respiratory symptoms (SOB, difficulty breathing, cough)  [x] None    Travel Screening completed.

## 2021-11-03 ENCOUNTER — HOSPITAL ENCOUNTER (OUTPATIENT)
Dept: PHARMACY | Age: 81
Setting detail: THERAPIES SERIES
Discharge: HOME | End: 2021-11-03
Payer: MEDICARE

## 2021-11-03 VITALS
HEART RATE: 51 BPM | DIASTOLIC BLOOD PRESSURE: 62 MMHG | WEIGHT: 147 LBS | SYSTOLIC BLOOD PRESSURE: 124 MMHG | BODY MASS INDEX: 25.22 KG/M2

## 2021-11-03 DIAGNOSIS — Z79.01 ENCOUNTER FOR CURRENT LONG-TERM USE OF ANTICOAGULANTS: Primary | ICD-10-CM

## 2021-11-03 DIAGNOSIS — I48.0 PAROXYSMAL ATRIAL FIBRILLATION (HCC): ICD-10-CM

## 2021-11-03 LAB — INR BLD: 2.5

## 2021-11-03 PROCEDURE — 85610 PROTHROMBIN TIME: CPT

## 2021-11-03 PROCEDURE — 99211 OFF/OP EST MAY X REQ PHY/QHP: CPT

## 2021-11-03 NOTE — PATIENT INSTRUCTIONS
Continue to monitor urine and stool. Continue to monitor for signs of bleeding. Return to clinic in 6 weeks. Continue warfarin whole 3 mg tablet po every Friday and 1.5 tablets for 4.5 mg po all other days.

## 2021-11-03 NOTE — PROGRESS NOTES
Micheal [address] [place] [address]/[place]  Medication Management  ANTICOAGULATION    Referring Provider: Beni Wade     GOAL INR: 2-3     TODAY'S INR: 2.5     WARFARIN Dosage: Continue warfarin whole 3 mg tablet po every Friday and 1.5 tablets for 4.5 mg po all other days. INR (no units)   Date Value   2021 2.3   2021 1.8   2021 2   2021 1.6   2021 2.2   2021 2.5   2021 2.1     Medication changes:  none    Notes:    Fingerstick INR drawn per clinic protocol. Patient states no visible blood in urine and no black tarry stool. Denies any missed doses of warfarin. No change in other maintenance medications or in diet. Will recheck INR in 6 weeks. Patient acknowledges working in consult agreement with pharmacist as referred by his/her physician.                   For Pharmacy Admin Tracking Only     Intervention Detail: Adherence Monitorin   Total # of Interventions Recommended: 0   Total # of Interventions Accepted: 0   Time Spent (min):  Isidro Cobb [facility], PharmD

## 2021-11-04 ENCOUNTER — HOSPITAL ENCOUNTER (OUTPATIENT)
Dept: NON INVASIVE DIAGNOSTICS | Age: 81
Discharge: HOME | End: 2021-11-04
Payer: MEDICARE

## 2021-11-04 DIAGNOSIS — I34.0 SEVERE MITRAL REGURGITATION: ICD-10-CM

## 2021-11-04 DIAGNOSIS — I48.0 PAF (PAROXYSMAL ATRIAL FIBRILLATION) (HCC): ICD-10-CM

## 2021-11-04 DIAGNOSIS — Z95.2 S/P MITRAL VALVE REPLACEMENT: ICD-10-CM

## 2021-11-04 LAB
LV EF: 60 %
LVEF MODALITY: NORMAL

## 2021-11-04 PROCEDURE — 93306 TTE W/DOPPLER COMPLETE: CPT

## 2021-11-05 ENCOUNTER — TELEPHONE (OUTPATIENT)
Dept: CARDIOLOGY | Age: 81
End: 2021-11-05

## 2021-11-05 NOTE — TELEPHONE ENCOUNTER
----- Message from Lisbeth Mcelroy MD sent at 11/4/2021 11:49 PM EDT -----  Let patient know test result was ok.

## 2021-12-09 PROBLEM — K21.9 GASTROESOPHAGEAL REFLUX DISEASE WITHOUT ESOPHAGITIS: Status: ACTIVE | Noted: 2021-12-09

## 2021-12-15 ENCOUNTER — HOSPITAL ENCOUNTER (OUTPATIENT)
Dept: PHARMACY | Age: 81
Setting detail: THERAPIES SERIES
Discharge: HOME | End: 2021-12-15
Payer: MEDICARE

## 2021-12-15 VITALS
WEIGHT: 141 LBS | DIASTOLIC BLOOD PRESSURE: 58 MMHG | BODY MASS INDEX: 24.19 KG/M2 | HEART RATE: 56 BPM | SYSTOLIC BLOOD PRESSURE: 101 MMHG

## 2021-12-15 DIAGNOSIS — I48.0 PAROXYSMAL ATRIAL FIBRILLATION (HCC): ICD-10-CM

## 2021-12-15 DIAGNOSIS — Z79.01 ENCOUNTER FOR CURRENT LONG-TERM USE OF ANTICOAGULANTS: Primary | ICD-10-CM

## 2021-12-15 LAB — INR BLD: 3.2

## 2021-12-15 PROCEDURE — 99211 OFF/OP EST MAY X REQ PHY/QHP: CPT

## 2021-12-15 PROCEDURE — 85610 PROTHROMBIN TIME: CPT

## 2021-12-15 NOTE — PROGRESS NOTES
Micheal [facility]/[place]  Medication Management  ANTICOAGULATION    Referring Provider: Radha     GOAL INR: 2-3     TODAY'S INR: 3.2     WARFARIN Dosage: Hold warfarin today then continue warfarin whole 3 mg tablet po every Friday and 1.5 tablets for 4.5 mg po all other days. INR (no units)   Date Value   2021 2.5   2021 2.3   2021 1.8   2021 2   2021 1.6   2021 2.2   2021 2.5     Medication changes:  none    Notes:    Fingerstick INR drawn per clinic protocol. Patient states no visible blood in urine and no black tarry stool. Denies any missed doses of warfarin. No change in other maintenance medications or in diet. Will recheck INR in 4 weeks. Patient acknowledges working in consult agreement with pharmacist as referred by his/her physician. Patient is having trouble with heart burn and is eating differently due to heart burn and avoiding foods that trigger it. Patient has lost 10 - 15 pounds.        For Pharmacy Admin Tracking Only     Intervention Detail: Adherence Monitorin and Dose Adjustment: 1, reason: Therapy Optimization   Total # of Interventions Recommended: 1   Total # of Interventions Accepted: 1   Time Spent (min): [address] [place], [facility], PharmD

## 2021-12-15 NOTE — PATIENT INSTRUCTIONS
Continue to monitor urine and stool. Continue to monitor for signs of bleeding. Return to clinic in 4 weeks. Hold warfarin today then continue warfarin whole 3 mg tablet po every Friday and 1.5 tablets for 4.5 mg po all other days.

## 2022-01-12 ENCOUNTER — HOSPITAL ENCOUNTER (OUTPATIENT)
Dept: PHARMACY | Age: 82
Setting detail: THERAPIES SERIES
Discharge: HOME | End: 2022-01-12
Payer: MEDICARE

## 2022-01-12 VITALS
WEIGHT: 143 LBS | DIASTOLIC BLOOD PRESSURE: 50 MMHG | HEART RATE: 49 BPM | SYSTOLIC BLOOD PRESSURE: 125 MMHG | BODY MASS INDEX: 24.53 KG/M2

## 2022-01-12 DIAGNOSIS — I48.0 PAROXYSMAL ATRIAL FIBRILLATION (HCC): ICD-10-CM

## 2022-01-12 DIAGNOSIS — Z79.01 ENCOUNTER FOR CURRENT LONG-TERM USE OF ANTICOAGULANTS: Primary | ICD-10-CM

## 2022-01-12 LAB — INR BLD: 3.1

## 2022-01-12 PROCEDURE — 85610 PROTHROMBIN TIME: CPT

## 2022-01-12 PROCEDURE — 99211 OFF/OP EST MAY X REQ PHY/QHP: CPT

## 2022-01-12 NOTE — PATIENT INSTRUCTIONS
Please hold warfarin today. Continue current dose of warfarin as instructed on dosing calendar provided - 3 mg every Friday and 4.5 mg all other days. Return to clinic in 6 weeks. Continue to monitor urine and stool for signs and symptoms of bleeding. Please notify the clinic of any medication changes.

## 2022-01-12 NOTE — PROGRESS NOTES
Micheal [facility]/[place]  Medication Management  ANTICOAGULATION    Referring Provider: Addison Cullen INR: 2-3    TODAY'S INR: 3.1    WARFARIN Dosage: Hold warfarin today then resume 3 mg po every Friday; 4.5 mg po all other days    INR (no units)   Date Value   01/12/2022 3.1   12/15/2021 3.2   11/03/2021 2.5   09/29/2021 2.3   09/08/2021 1.8   08/11/2021 2   07/28/2021 1.6       Medication changes:  No changes  Notes:    Fingerstick INR drawn per clinic protocol. Patient states no visible blood in urine and no black tarry stool. Denies any missed doses of warfarin. No change in other maintenance medications or in diet. Will recheck INR in 6 weeks. Patient acknowledges working in consult agreement with pharmacist as referred by his/her physician. For Pharmacy Admin Tracking Only     Intervention Detail: Dose Adjustment: 1, reason: Therapy Optimization   Total # of Interventions Recommended: 1   Total # of Interventions Accepted: 1   Time Spent (min): 280 [facility].  Maryellen Steve, [address] [facility]

## 2022-01-31 ENCOUNTER — HOSPITAL ENCOUNTER (OUTPATIENT)
Dept: PHARMACY | Age: 82
Setting detail: THERAPIES SERIES
Discharge: HOME | End: 2022-01-31
Payer: MEDICARE

## 2022-01-31 VITALS
HEART RATE: 51 BPM | WEIGHT: 138 LBS | SYSTOLIC BLOOD PRESSURE: 117 MMHG | BODY MASS INDEX: 23.67 KG/M2 | DIASTOLIC BLOOD PRESSURE: 64 MMHG

## 2022-01-31 DIAGNOSIS — I48.0 PAROXYSMAL ATRIAL FIBRILLATION (HCC): ICD-10-CM

## 2022-01-31 DIAGNOSIS — Z79.01 ENCOUNTER FOR CURRENT LONG-TERM USE OF ANTICOAGULANTS: Primary | ICD-10-CM

## 2022-01-31 LAB — INR BLD: 2.7

## 2022-01-31 PROCEDURE — 99211 OFF/OP EST MAY X REQ PHY/QHP: CPT

## 2022-01-31 PROCEDURE — 85610 PROTHROMBIN TIME: CPT

## 2022-01-31 NOTE — PROGRESS NOTES
Amandaalexey [address] [place] [address]/[place]  Medication Management  ANTICOAGULATION    Referring Provider: Citlaly Sanchez     GOAL INR: 2-3     TODAY'S INR: 2.7     WARFARIN Dosage: Continue warfarin whole 3 mg tablet every Friday and 1.5 tablets for 4.5 mg all other days. INR (no units)   Date Value   2022 3.1   12/15/2021 3.2   2021 2.5   2021 2.3   2021 1.8   2021 2   2021 1.6     Medication changes:  None    Notes:    Fingerstick INR drawn per clinic protocol. Patient states no visible blood in urine and no black tarry stool. Denies any missed doses of warfarin. No change in other maintenance medications or in diet. Will recheck INR in 5 weeks. Patient acknowledges working in consult agreement with pharmacist as referred by his/her physician.                   For Pharmacy Admin Tracking Only     Intervention Detail: Adherence Monitorin   Total # of Interventions Recommended: 0   Total # of Interventions Accepted: 0   Time Spent (min):  Isidro Cobb [facility] [place], PharmD

## 2022-02-21 ENCOUNTER — ANTI-COAG VISIT (OUTPATIENT)
Dept: PHARMACY | Age: 82
End: 2022-02-21

## 2022-02-21 DIAGNOSIS — I48.0 PAROXYSMAL ATRIAL FIBRILLATION (HCC): ICD-10-CM

## 2022-02-21 DIAGNOSIS — Z79.01 ENCOUNTER FOR CURRENT LONG-TERM USE OF ANTICOAGULANTS: Primary | ICD-10-CM

## 2022-02-21 PROBLEM — I50.32 CHRONIC DIASTOLIC HEART FAILURE (HCC): Status: RESOLVED | Noted: 2021-02-12 | Resolved: 2022-02-21

## 2022-02-21 NOTE — PROGRESS NOTES
Called new RX into [facility] for warfarin 3 mg tablets take 1 tablet for 3 mg on Fridays and 1.5 tablets for 4.5 mg all other days with 90 day supply and 3 refills per Dr Bhavya Salinas.     For Pharmacy Admin Tracking Only     Intervention Detail: Refill(s) Provided   Total # of Interventions Recommended: 1   Total # of Interventions Accepted: 1   Time Spent (min): 10      Jennifer Gold PharmD 2/21/2022 12:42 PM

## 2022-03-07 ENCOUNTER — HOSPITAL ENCOUNTER (OUTPATIENT)
Dept: PHARMACY | Age: 82
Setting detail: THERAPIES SERIES
Discharge: HOME | End: 2022-03-07
Payer: MEDICARE

## 2022-03-07 VITALS
WEIGHT: 140 LBS | SYSTOLIC BLOOD PRESSURE: 115 MMHG | BODY MASS INDEX: 24.02 KG/M2 | DIASTOLIC BLOOD PRESSURE: 60 MMHG | HEART RATE: 50 BPM

## 2022-03-07 DIAGNOSIS — Z79.01 ENCOUNTER FOR CURRENT LONG-TERM USE OF ANTICOAGULANTS: Primary | ICD-10-CM

## 2022-03-07 DIAGNOSIS — I48.0 PAROXYSMAL ATRIAL FIBRILLATION (HCC): ICD-10-CM

## 2022-03-07 LAB — INR BLD: 2

## 2022-03-07 PROCEDURE — 85610 PROTHROMBIN TIME: CPT

## 2022-03-07 PROCEDURE — 99211 OFF/OP EST MAY X REQ PHY/QHP: CPT

## 2022-03-07 NOTE — PATIENT INSTRUCTIONS
Continue to monitor urine and stool. Continue to monitor for signs of bleeding. Return to clinic in 2 weeks. Continue warfarin whole 3 mg tablet every Friday and 1.5 tablets for 4.5 mg all other days.

## 2022-03-07 NOTE — PROGRESS NOTES
Micheal [address] [place] [address]/[place]  Medication Management  ANTICOAGULATION    Referring Provider: Radha     GOAL INR: 2-3     TODAY'S INR: 2.0     WARFARIN Dosage: Continue warfarin whole 3 mg tablet every Friday and 1.5 tablets for 4.5 mg all other days. INR (no units)   Date Value   2022 2.7   2022 3.1   12/15/2021 3.2   2021 2.5   2021 2.3   2021 1.8   2021 2     Medication changes:  Medrol dose burke    Notes:    Fingerstick INR drawn per clinic protocol. Patient states no visible blood in urine and no black tarry stool. Denies any missed doses of warfarin. No change in other maintenance medications or in diet. Will recheck INR in 2 weeks. Patient acknowledges working in consult agreement with pharmacist as referred by his/her physician.                   For Pharmacy Admin Tracking Only     Intervention Detail: Adherence Monitorin and New Rx: 1, reason: Needs Additional Therapy   Total # of Interventions Recommended: 1   Total # of Interventions Accepted: 1   Time Spent (min):  Isidro Cobb, [facility], PharmD

## 2022-03-23 ENCOUNTER — HOSPITAL ENCOUNTER (OUTPATIENT)
Dept: PHARMACY | Age: 82
Setting detail: THERAPIES SERIES
Discharge: HOME | End: 2022-03-23
Payer: MEDICARE

## 2022-03-23 VITALS
DIASTOLIC BLOOD PRESSURE: 54 MMHG | BODY MASS INDEX: 24.19 KG/M2 | WEIGHT: 141 LBS | HEART RATE: 49 BPM | SYSTOLIC BLOOD PRESSURE: 116 MMHG

## 2022-03-23 DIAGNOSIS — Z79.01 ENCOUNTER FOR CURRENT LONG-TERM USE OF ANTICOAGULANTS: Primary | ICD-10-CM

## 2022-03-23 DIAGNOSIS — I48.0 PAROXYSMAL ATRIAL FIBRILLATION (HCC): ICD-10-CM

## 2022-03-23 LAB — INR BLD: 2.4

## 2022-03-23 PROCEDURE — 85610 PROTHROMBIN TIME: CPT

## 2022-03-23 PROCEDURE — 99211 OFF/OP EST MAY X REQ PHY/QHP: CPT

## 2022-03-23 NOTE — PROGRESS NOTES
Micheal [address] [place] [address]/[place]  Medication Management  ANTICOAGULATION    Referring Provider: Radha     GOAL INR: 2-3     TODAY'S INR: 2.4     WARFARIN Dosage: Continue warfarin whole 3 mg tablet every Friday and 1.5 tablets for 4.5 mg all other days. INR (no units)   Date Value   2022 2   2022 2.7   2022 3.1   12/15/2021 3.2   2021 2.5   2021 2.3   2021 1.8     Medication changes:  Stopped Zanaflex    Notes:    Fingerstick INR drawn per clinic protocol. Patient states no visible blood in urine and no black tarry stool. Denies any missed doses of warfarin. No change in other maintenance medications or in diet. Will recheck INR in 6 weeks. Patient acknowledges working in consult agreement with pharmacist as referred by his/her physician.                   For Pharmacy Admin Tracking Only     Intervention Detail: Adherence Monitorin and New Rx: 1, reason: Patient Preference   Total # of Interventions Recommended: 1   Total # of Interventions Accepted: 1   Time Spent (min):  Isidro Cobb, [facility], PharmD

## 2022-03-23 NOTE — PATIENT INSTRUCTIONS
Continue to monitor urine and stool. Continue to monitor for signs of bleeding. Return to clinic in 6 weeks.   Continue warfarin whole 3 mg tablet every Friday and 1.5 tablets for 4.5 mg all other days.

## 2022-04-14 ENCOUNTER — HOSPITAL ENCOUNTER (OUTPATIENT)
Age: 82
Discharge: HOME | End: 2022-04-14
Payer: MEDICARE

## 2022-04-14 LAB
ANION GAP SERPL CALCULATED.3IONS-SCNC: 6 MMOL/L (ref 9–17)
BUN BLDV-MCNC: 13 MG/DL (ref 8–23)
BUN/CREAT BLD: 18 (ref 9–20)
CALCIUM SERPL-MCNC: 9.5 MG/DL (ref 8.6–10.4)
CHLORIDE BLD-SCNC: 103 MMOL/L (ref 98–107)
CO2: 32 MMOL/L (ref 20–31)
CREAT SERPL-MCNC: 0.73 MG/DL (ref 0.5–0.9)
GFR AFRICAN AMERICAN: >60 ML/MIN
GFR NON-AFRICAN AMERICAN: >60 ML/MIN
GFR SERPL CREATININE-BSD FRML MDRD: ABNORMAL ML/MIN/{1.73_M2}
GFR SERPL CREATININE-BSD FRML MDRD: ABNORMAL ML/MIN/{1.73_M2}
GLUCOSE BLD-MCNC: 90 MG/DL (ref 70–99)
POTASSIUM SERPL-SCNC: 4.3 MMOL/L (ref 3.7–5.3)
SODIUM BLD-SCNC: 141 MMOL/L (ref 135–144)

## 2022-04-14 PROCEDURE — 80048 BASIC METABOLIC PNL TOTAL CA: CPT

## 2022-04-14 PROCEDURE — 36415 COLL VENOUS BLD VENIPUNCTURE: CPT

## 2022-04-19 ENCOUNTER — OFFICE VISIT (OUTPATIENT)
Dept: CARDIOLOGY | Age: 82
End: 2022-04-19
Payer: MEDICARE

## 2022-04-19 VITALS
HEART RATE: 55 BPM | SYSTOLIC BLOOD PRESSURE: 110 MMHG | BODY MASS INDEX: 23.63 KG/M2 | RESPIRATION RATE: 16 BRPM | HEIGHT: 64 IN | DIASTOLIC BLOOD PRESSURE: 70 MMHG | OXYGEN SATURATION: 97 % | WEIGHT: 138.4 LBS

## 2022-04-19 DIAGNOSIS — Z95.2 H/O MITRAL VALVE REPLACEMENT: ICD-10-CM

## 2022-04-19 DIAGNOSIS — I34.0 SEVERE MITRAL REGURGITATION: ICD-10-CM

## 2022-04-19 DIAGNOSIS — I48.0 PAF (PAROXYSMAL ATRIAL FIBRILLATION) (HCC): ICD-10-CM

## 2022-04-19 DIAGNOSIS — I50.32 CHRONIC DIASTOLIC HEART FAILURE (HCC): ICD-10-CM

## 2022-04-19 PROCEDURE — 99214 OFFICE O/P EST MOD 30 MIN: CPT | Performed by: FAMILY MEDICINE

## 2022-04-19 RX ORDER — METOPROLOL SUCCINATE 25 MG/1
12.5 TABLET, EXTENDED RELEASE ORAL DAILY
Qty: 45 TABLET | Refills: 3 | Status: SHIPPED | OUTPATIENT
Start: 2022-04-19

## 2022-04-19 NOTE — PROGRESS NOTES
Yajaira Abrams RN am scribing for and in the presence of Bharathi Ramos. Renato MARTIN, MS, F.A.C.C..    Patient: Bennet Jeans  : 1940  Date of Visit: 2022    REASON FOR VISIT / CONSULTATION: 6 Month Follow-Up (Hx: Severe MR, PAF, CHF. still having heart burn and Pt report she he has been having gastric pains and is doing a cat scan of belly and sees a GI doctor in the next couple of week (IBS). SOB with exertion but sometimes without but has stayed the same since last visit. Pt does an exercise class at the [facility] and . Denies CP, lightheadedness, dizziness. Intermittent palpitations (sporatic-lately notices some at night time. but not bothersome. )    Dear Emmette Koyanagi, MD,    I had the pleasure of seeing Bennet Jeans today. Ms. Elise Solano is a 80 y.o. female with a history of paroxysmal atrial fibrillation. She reports that this 1st occurred a few year ago and most recently occurred first part of February  but she says she has always had some degree of palpitations. Her last previous sustained event was about a year ago. She also has a history of mitral valve prolapse but denies any heart attacks, other cardiac issues or stroke or mini stroke. She underwent a cardiovascular stress test on 2018 that had show to be consistent with low to intermediate risk of coronary artery disease. Her cardiac catheterization on 2018 had shown mild irregularities with the worst being in her LAD at 20-30%. She was also admitted to the hospital for tikosyn loading on 2018 and has been maintained on this ever since with good results. She had an Echo done on 2020 that showed the left atrium that is severely dilated (>40) with a left atrial volume index of 76 ml/m2. Bileaflet prolapse with more prominent P2 prolapse and moderate to severe mitral regurgitation. Moderate tricuspid regurgitation. Mild pulmonary hypertension with estimated pulmonary artery systolic pressure of 33 mmHg. Moderate diastolic dysfunction. Her EF was 60%. Mitral valve replacement done at [facility] using 29 mm Epic Bioprosthetic valve on 10/06/2020. MAZE procedure done on 10/06/2020 as well as left atrial clipping. Repeat echocardiogram on 11/4/2021 showed and ejection fraction of 60% with no significant mitral regurgitation with moderate diastolic dysfunction. Ms. Merline Palmer reports that she does get several episodes of palpitations more at night time than anything. She says it can last about an hour but no more than this. She says her heart rate is usually on the lower side. Ms. Merline Palmer reports that she does go to an exercising class on Monday and Fridays for about thirty five minutes and does well with this. She denied any chest pain or discomfort, lightheadedness/dizziness,  bleeding problems, problems with her medications or any other concerns at this time     Weight is down seven pounds since last visit with no signs of fluid overload. Wt Readings from Last 3 Encounters:   04/19/22 138 lb 6.4 oz (62.8 kg)   03/23/22 141 lb (64 kg)   03/07/22 140 lb (63.5 kg)      Exercise Tolerance: Ms. Springer reports that she has a fairly good exercise tolerance. Her says that she exercise about 35 minutes without any problems. Past Medical History:   Diagnosis Date    Cancer [facility])     [facility]estive [facility] failure) [facility]     COVID-19 virus infection /   Climmie Messier vaccine 2022    Essential hypertension     Fracture of sacrum ([facility] [address] 2011    GERD (gastroesophageal reflux disease)     H/O cardiovascular stress test 02/28/2018    Equivocal myocardial perfusion study. There is a small/moderate perufsion defect of mild intensity in the apical an anteroapical regions during stress imaging which is most consistent with artifact but may be due to a small degree of coronary ischemia. Overall, these results are most consistent with a low/intermediate risk for CAD.     H/O echocardiogram 02/02/2018    EF 60%.  Mildly increased left ventricular wall thickness. No definite specific wall motion abnormalities. Bi-atrial enlargment. Myxomatous thickening of the mitral leaflets with bileaflet prolapse and moderat mitral regurg. Moderate tricuspid regurg. Mild pulmonary HTN with an estimated right ventricualr systolic pressure 02RCDP.  History of Holter monitoring 2016    PACs & PVCs    Irritable bowel syndrome     Mitral valve prolapse     Osteoarthritis     Osteopenia     Paroxysmal atrial fibrillation (HCC) 2018    Rosacea     Tendinitis of left shoulder        CURRENT ALLERGIES: Butalbital-aspirin-caffeine and Other REVIEW OF SYSTEMS: 14 systems were reviewed. Pertinent positives and negatives as above, all else negative.      Past Surgical History:   Procedure Laterality Date    BUNIONECTOMY      CARDIAC CATHETERIZATION Left 07/02/2020    DR [facility]/right radial-    COLONOSCOPY  2008    CYSTOCELE REPAIR      ENDOSCOPY, COLON, DIAGNOSTIC      EYE SURGERY      HERNIA REPAIR      HYSTERECTOMY      MOHS SURGERY  2008    SKIN BIOPSY      LUBA AND BSO Bilateral 2014    TRANSESOPHAGEAL ECHOCARDIOGRAM  07/02/2020    Dr Gisele Telles/Unsuccessful attempt    TRANSESOPHAGEAL ECHOCARDIOGRAM  07/09/2020    Dr Nivia Telles/Failed, anesthesia sedate    UPPER GASTROINTESTINAL ENDOSCOPY N/A 12/10/2019    -antral erythema,bx(mild chronic inactive gastritis with focal intestinal metaplasia,neg H-Pylori)dilation    UPPER GASTROINTESTINAL ENDOSCOPY  12/10/2019    EGD DILATION SAVORY performed by Raina Baumann MD at [place] [address]  2003    Sling    VASCULAR SURGERY      VEIN SURGERY      Social History:  Social History     Tobacco Use    Smoking status: Never Smoker    Smokeless tobacco: Never Used   Vaping Use    Vaping Use: Never used   Substance Use Topics    Alcohol use: Never     Comment: occasionally    Drug use: No        CURRENT 23.76 kg/m². Constitutional: She is oriented to person, place, and time. She appears well-developed and well-nourished. In no acute distress. HEENT: Normocephalic and atraumatic. No JVD present. Carotid bruit is not present. No mass and no thyromegaly present. No lymphadenopathy present. Cardiovascular: Normal rate, regular rhythm, normal heart sounds. Exam reveals no gallop and no friction rubs. 2/6 systolic murmur, 5th intercostal space on the LEFT in the mid-clavicular line (cardiac apex). Pulmonary/Chest: Effort normal and breath sounds normal. No respiratory distress. She has no wheezes, rhonchi or rales. Abdominal: Soft, non-tender. Bowel sounds and aorta are normal. She exhibits no organomegaly, mass or bruit. Extremities:  No edema. No cyanosis and no clubbing. Pulses are 2+ radial and carotid pulses. 2+ dorsalis pedis and posterior tibial pulses bilaterally. Compression stockings in place. Neurological: She is alert and oriented to person. No evidence of gross cranial nerve deficit. Coordination appeared normal.   Skin: Skin is warm and dry. There is no rash or diaphoresis. Psychiatric: She has a normal mood and affect. Her speech is normal and behavior is normal.      MOST RECENT LABS ON RECORD:   Lab Results   Component Value Date    WBC 3.5 10/21/2021    HGB 12.7 10/21/2021    HCT 40.0 10/21/2021     10/21/2021    CHOL 180 05/11/2019    TRIG 39 05/11/2019    HDL 67 05/11/2019    ALT 20 12/07/2018    AST 26 12/07/2018     04/14/2022    K 4.3 04/14/2022     04/14/2022    CREATININE 0.73 04/14/2022    BUN 13 04/14/2022    CO2 32 (H) 04/14/2022    TSH 2.46 04/24/2018    INR 2.4 03/23/2022    LABA1C 5.7 12/07/2018     ASSESSMENT:  1. Severe mitral regurgitation    2. H/O mitral valve replacement    3. PAF (paroxysmal atrial fibrillation) ([address]    4.  Chronic diastolic heart failure (HCC)       PLAN:  · History of Severe Mitral Regurgitation: s/p mitral valve replacement done on 10/6/2020, with 29 mm Epic valve (Bioprosthetic). Currently appears well controlled. Severe Tricuspid Regurgitation seen on her last echocardiogram on 11/4/2021  · Beta Blocker Therapy: Continue Metoprolol succinate (Toprol XL)  12.5 mg  daily. · Counseling: I did explain to her if she ever does need to under go a colonscopy, dental cleaning, etc. That she will require to be on an antibiotic for this prior to the procedure. · Intermittent Palpitations-Symptomatic   · Additional Testing: I Ordered a CAM monitor for three days to try and pinpoint the etiology of Ms. Springer's symptoms and to see her percentage of atrial fibrillation per day. · Beta Blocker: Continue Metoprolol succinate (Toprol XL) 25 mg 1/2 tab daily. Paroxysmal Atrial Fibrillation: Rhythm Control  S/p MAZE procedure on 10/6/2020. Beta Blocker: Continue Metoprolol succinate (Toprol XL) 25 mg 1/2 tab daily. Rhythm Control Agent: Not indicated  Stroke Risk: Her CHADS2-VASc score is 3/9 (3.2% stroke risk)   [id number] Score for Atrial Fibrillation Stroke Risk   Risk   Factors  Component Value   C CHF No 0   H HTN No 0   A2 Age >= 76 Yes,  (80 y.o.) 2   D DM No 0   S2 Prior Stroke/TIA No 0   V Vascular Disease No 0   A Age 74-69 No,  (80 y.o.) 0   Sc Sex female 1    [id number]  Score  3   Score last updated 8/65/56 3:96 AM EDT  Click here for a link to the UpToDate guideline \"Atrial Fibrillation: Anticoagulation therapy to prevent embolization  Disclaimer: Risk Score calculation is dependent on accuracy of patient problem list and past encounter diagnosis. Anticoagulation: Continue Warfarin. Goal INR 2-3. I also reminded her to watch for signs of blood in her stool or black tarry stools and stop the medication immediately if this develops as this could be life threatening. · Additional Testing: I Ordered a  CAM monitor for three days to try and pinpoint the etiology of Ms. Springer's symptoms and to see her percentage of atrial fibrillation per day.  Chronic diastolic heart failure: New York Heart Association Class: IIa (Mild symptoms only in normal activities)   Beta Blocker: Continue Metoprolol succinate (Toprol XL) 25 mg 1/2 tab daily.  ACE Inibitor/ARB: Not indicated at this time.  Diuretics: not indicated at this time.  Heart failure counseling: I told her to continue wearing lower extremity compression stockings and I advised them to try and keep their legs up whenever possible and to limit salt in their diet. Finally, I recommended that she continue her other medications and follow up with you as previously scheduled. FOLLOW UP:   I told Ms. Springer to call my office if she had any problems, but otherwise I asked her to Return in about 1 year (around 4/19/2023). However, I would be happy to see her sooner should the need arise. Sincerely,  Rosanna Burt. Renato MARTIN, MS, F.A.C.C. [facility] Cardiology Specialist    [address] [place] [address], [address] [place] [address]  Phone: [phone], Fax: [phone]     I believe that the risk of significant morbidity and mortality related to the patient's current medical conditions are: intermediate    The documentation recorded by the scribe, accurately and completely reflects the services I personally performed and the decisions made by me. Ismael Oro MD, MS, F.A.C.C.  April 19, 2022

## 2022-04-19 NOTE — PATIENT INSTRUCTIONS
SURVEY:    You may be receiving a survey from [employer] regarding your visit today. Please complete the survey to enable us to provide the highest quality of care to you and your family. If you cannot score us a very good on any question, please call the office to discuss how we could have made your experience a very good one. Thank you.

## 2022-04-25 ENCOUNTER — HOSPITAL ENCOUNTER (OUTPATIENT)
Dept: NON INVASIVE DIAGNOSTICS | Age: 82
Discharge: HOME | End: 2022-04-25
Payer: MEDICARE

## 2022-04-25 DIAGNOSIS — Z95.2 H/O MITRAL VALVE REPLACEMENT: ICD-10-CM

## 2022-04-25 DIAGNOSIS — I34.0 SEVERE MITRAL REGURGITATION: ICD-10-CM

## 2022-04-25 DIAGNOSIS — I50.32 CHRONIC DIASTOLIC HEART FAILURE (HCC): ICD-10-CM

## 2022-04-25 DIAGNOSIS — I48.0 PAF (PAROXYSMAL ATRIAL FIBRILLATION) (HCC): ICD-10-CM

## 2022-04-25 PROCEDURE — 93243 EXT ECG>48HR<7D SCAN A/R: CPT

## 2022-04-25 PROCEDURE — 93242 EXT ECG>48HR<7D RECORDING: CPT

## 2022-05-04 ENCOUNTER — HOSPITAL ENCOUNTER (OUTPATIENT)
Dept: PHARMACY | Age: 82
Setting detail: THERAPIES SERIES
Discharge: HOME | End: 2022-05-04
Payer: MEDICARE

## 2022-05-04 VITALS
BODY MASS INDEX: 23.86 KG/M2 | HEART RATE: 51 BPM | WEIGHT: 139 LBS | DIASTOLIC BLOOD PRESSURE: 47 MMHG | SYSTOLIC BLOOD PRESSURE: 110 MMHG

## 2022-05-04 DIAGNOSIS — I48.0 PAROXYSMAL ATRIAL FIBRILLATION (HCC): ICD-10-CM

## 2022-05-04 DIAGNOSIS — Z79.01 ENCOUNTER FOR CURRENT LONG-TERM USE OF ANTICOAGULANTS: Primary | ICD-10-CM

## 2022-05-04 LAB — INR BLD: 2.1

## 2022-05-04 PROCEDURE — 85610 PROTHROMBIN TIME: CPT

## 2022-05-04 PROCEDURE — 99211 OFF/OP EST MAY X REQ PHY/QHP: CPT

## 2022-05-04 NOTE — PROCEDURES
[address] [facility] [id number]                                 EVENT MONITOR    PATIENT NAME: Moustapha Redmond                   :        1940  MED REC NO:   [id number]                              ROOM:  ACCOUNT NO:   [de-identified]                           ADMIT DATE: 2022  PROVIDER:     Benita Rivera MD    CARDIOVASCULAR DIAGNOSTIC DEPARTMENT    DATE OF STUDY:  2022    ORDERING PROVIDER:  Benita Rivera MD    PRIMARY CARE PROVIDER:  Ramona Severance. Gus Pena MD    INTERPRETING PHYSICIAN:  Benita Rivera MD    DIAGNOSIS:  Paroxysmal atrial fibrillation. PHYSICIAN INTERPRETATION:  1. Predominant rhythm:  Sinus bradycardia. 2.  Atrial tachycardia:  4 episodes. Longest, 8 beats at average 118  bpm up to 128 bpm.  Fastest, 5 beats at average 138 bpm up to 168 bpm.  3.  Accelerated idioventricular rhythm. 4.  Bifid P-wave was observed. Consider left atrial disease. 5.  PAC 9.24%. 6.  PVC 0.88%. Multiple short runs of atrial tachycardia as above, the longest being 8  beats at 118 bpm.  One symptom was reported and was associated with  isolated PACs. Clinical correlation required. Nick Kirby MD    D: 2022 12:36:10       T: 2022 12:41:13     DELON/TRACI_EDIT  Job#: [id number]     Doc#: Unknown    CC:  Ramona Severance.  Gus Pena MD

## 2022-05-04 NOTE — PROGRESS NOTES
Micheal [address] [place] [address]/[place]  Medication Management  ANTICOAGULATION    Referring Provider: Lisa Aguilar INR: 2-3    TODAY'S INR: 2.1    WARFARIN Dosage: Continue warfarin 3 mg po every Friday; 4.5 mg po all other days    INR (no units)   Date Value   2022 2.1   2022 2.4   2022 2   2022 2.7   2022 3.1   12/15/2021 3.2   2021 2.5       Medication changes:  Stopped taking vitamin C  Notes:    Fingerstick INR drawn per clinic protocol. Patient states no visible blood in urine and no black tarry stool. Denies any missed doses of warfarin. No change in other maintenance medications or in diet. Will recheck INR in 6 weeks. Patient acknowledges working in consult agreement with pharmacist as referred by his/her physician. For Pharmacy Admin Tracking Only     Intervention Detail: Adherence Monitorin   Total # of Interventions Recommended: 1   Total # of Interventions Accepted: 1   Time Spent (min): [address] [facility].  [place], [facility]

## 2022-05-05 ENCOUNTER — TELEPHONE (OUTPATIENT)
Dept: CARDIOLOGY | Age: 82
End: 2022-05-05

## 2022-05-05 NOTE — TELEPHONE ENCOUNTER
----- Message from Dwayne Segovia MD sent at 5/5/2022 12:12 AM EDT -----  Let Ms. Springer know their test result was ok. Will discuss at next visit. Thanks.

## 2022-05-10 ENCOUNTER — HOSPITAL ENCOUNTER (OUTPATIENT)
Dept: CT IMAGING | Age: 82
Discharge: HOME | End: 2022-05-12
Payer: MEDICARE

## 2022-05-10 DIAGNOSIS — K59.01 SLOW TRANSIT CONSTIPATION: ICD-10-CM

## 2022-05-10 PROCEDURE — 74177 CT ABD & PELVIS W/CONTRAST: CPT

## 2022-05-10 PROCEDURE — 6360000004 HC RX CONTRAST MEDICATION: Performed by: NURSE PRACTITIONER

## 2022-05-10 RX ADMIN — IOPAMIDOL 18 ML: 755 INJECTION, SOLUTION INTRAVENOUS at 12:02

## 2022-05-10 RX ADMIN — IOPAMIDOL 75 ML: 755 INJECTION, SOLUTION INTRAVENOUS at 12:00

## 2022-06-15 ENCOUNTER — HOSPITAL ENCOUNTER (OUTPATIENT)
Dept: PHARMACY | Age: 82
Setting detail: THERAPIES SERIES
Discharge: HOME | End: 2022-06-15
Payer: MEDICARE

## 2022-06-15 VITALS
DIASTOLIC BLOOD PRESSURE: 51 MMHG | SYSTOLIC BLOOD PRESSURE: 97 MMHG | HEART RATE: 54 BPM | BODY MASS INDEX: 23.17 KG/M2 | WEIGHT: 135 LBS

## 2022-06-15 DIAGNOSIS — I48.0 PAROXYSMAL ATRIAL FIBRILLATION (HCC): ICD-10-CM

## 2022-06-15 DIAGNOSIS — Z79.01 ENCOUNTER FOR CURRENT LONG-TERM USE OF ANTICOAGULANTS: Primary | ICD-10-CM

## 2022-06-15 LAB — INR BLD: 2.2

## 2022-06-15 PROCEDURE — 99211 OFF/OP EST MAY X REQ PHY/QHP: CPT

## 2022-06-15 PROCEDURE — 85610 PROTHROMBIN TIME: CPT

## 2022-06-15 NOTE — PROGRESS NOTES
Micheal [address] [place] [address]/[place]  Medication Management  ANTICOAGULATION    Referring Provider: Dr. Abbi Krishnamurthy INR: 2-3    TODAY'S INR: 2.2    WARFARIN Dosage: Continue warfarin 3 mg po every Friday; 4.5 mg po all other days    INR (no units)   Date Value   06/15/2022 2.2   2022 2.1   2022 2.4   2022 2   2022 2.7   2022 3.1   12/15/2021 3.2       Medication changes:  No changes  Notes:    Fingerstick INR drawn per clinic protocol. Patient states no visible blood in urine and no black tarry stool. Denies any missed doses of warfarin. No change in other maintenance medications or in diet. Will recheck INR in 6 weeks. Patient acknowledges working in consult agreement with pharmacist as referred by his/her physician. For Pharmacy Admin Tracking Only     Intervention Detail: Adherence Monitorin   Total # of Interventions Recommended: 1   Total # of Interventions Accepted: 1   Time Spent (min): [address] [facility] [address].  Samantha Cosme [facility]

## 2022-07-27 ENCOUNTER — APPOINTMENT (OUTPATIENT)
Dept: PHARMACY | Age: 82
End: 2022-07-27
Payer: MEDICARE

## 2022-08-02 ENCOUNTER — HOSPITAL ENCOUNTER (OUTPATIENT)
Dept: PHARMACY | Age: 82
Setting detail: THERAPIES SERIES
Discharge: HOME | End: 2022-08-02
Payer: MEDICARE

## 2022-08-02 VITALS
WEIGHT: 135 LBS | DIASTOLIC BLOOD PRESSURE: 58 MMHG | HEART RATE: 56 BPM | BODY MASS INDEX: 23.17 KG/M2 | SYSTOLIC BLOOD PRESSURE: 109 MMHG

## 2022-08-02 DIAGNOSIS — Z79.01 ENCOUNTER FOR CURRENT LONG-TERM USE OF ANTICOAGULANTS: Primary | ICD-10-CM

## 2022-08-02 DIAGNOSIS — I48.0 PAROXYSMAL ATRIAL FIBRILLATION (HCC): ICD-10-CM

## 2022-08-02 LAB — INR BLD: 2.4

## 2022-08-02 PROCEDURE — 99211 OFF/OP EST MAY X REQ PHY/QHP: CPT

## 2022-08-02 PROCEDURE — 85610 PROTHROMBIN TIME: CPT

## 2022-08-02 NOTE — PROGRESS NOTES
Micheal [address] [place] [address]/[place]  Medication Management  ANTICOAGULATION    Referring Provider: Dr. Katrin Nuñez INR: 2-3    TODAY'S INR: 2.4    WARFARIN Dosage: Continue warfarin 3 mg po every Friday; 4.5 mg po all other days    INR (no units)   Date Value   2022 2.4   06/15/2022 2.2   2022 2.1   2022 2.4   2022 2   2022 2.7   2022 3.1       Medication changes:  No changes  Notes:    Fingerstick INR drawn per clinic protocol. Patient states no visible blood in urine and no black tarry stool. Denies any missed doses of warfarin. No change in other maintenance medications or in diet. Will recheck INR in 6 weeks. Patient acknowledges working in consult agreement with pharmacist as referred by his/her physician. For Pharmacy Admin Tracking Only    Intervention Detail: Adherence Monitorin  Total # of Interventions Recommended: 1  Total # of Interventions Accepted: 1  Time Spent (min): ESPERANZA/ [address].  Yetta Smoke, [facility]

## 2022-08-26 PROBLEM — M62.830 MUSCLE SPASM OF BACK: Status: RESOLVED | Noted: 2020-12-21 | Resolved: 2022-08-26

## 2022-08-31 ENCOUNTER — OFFICE VISIT (OUTPATIENT)
Dept: VASCULAR SURGERY | Age: 82
End: 2022-08-31
Payer: MEDICARE

## 2022-08-31 VITALS
TEMPERATURE: 97.5 F | SYSTOLIC BLOOD PRESSURE: 109 MMHG | HEIGHT: 64 IN | DIASTOLIC BLOOD PRESSURE: 54 MMHG | WEIGHT: 134.7 LBS | HEART RATE: 91 BPM | BODY MASS INDEX: 23 KG/M2 | RESPIRATION RATE: 18 BRPM

## 2022-08-31 DIAGNOSIS — I83.813 VARICOSE VEINS OF BILATERAL LOWER EXTREMITIES WITH PAIN: Primary | ICD-10-CM

## 2022-08-31 PROCEDURE — 1123F ACP DISCUSS/DSCN MKR DOCD: CPT | Performed by: INTERNAL MEDICINE

## 2022-08-31 PROCEDURE — 99204 OFFICE O/P NEW MOD 45 MIN: CPT | Performed by: INTERNAL MEDICINE

## 2022-08-31 NOTE — PROGRESS NOTES
Sharmila Jennings was seen on 8/31/2022 for   Chief Complaint   Patient presents with    New Patient     Varicose veins with pain   .                             REVIEW OF SYSTEMS    Constitutional Weight: absent, A, Fatigue: absent Fever: absent   HEENT Ears: normal,Visual disturbance: absent Sore throat: absent,    Respiratory Shortness of breath: absent, Cough: absent;, Snoring: absent   Cardivascular Chest pain: absent,  Leg pain: present,Leg swelling:present, Non-healing wound:absent    GI Diarrhea: absent, Abdominal Pain: absent    Urinary frequency: absent, Urinary incontinence: absent   Musculoskeletal Neck pain: absent, Back pain: absent, Restless Leg:absent     Dermatological Hair loss: absent, Skin changes: absent   Neurological  Sudden Loss of Vision in one eye:absent, Slurred Speech:absent, Weakness on one side of body: absent,Headache: absent   Psychiatric Anxiety: absent, Depression: absent   Hematologic Abnormal bleeding: absent,

## 2022-08-31 NOTE — PROGRESS NOTES
Alisia Nazario was seen on 8/31/2022 for   Chief Complaint   Patient presents with    New Patient     Varicose veins with pain   . 8/31/22 [address] [place] [address] Vascular visit for VV. Referred by Dr Maricarmen Cavanaugh. Has had prior bilateral stripping in [facility]. Probably Dr Ze Ignacio. Had 4 children, all girls, some problems with pregnancies. Had prior injury to legs age 12. No FH of VV. Both legs have heavy, aching feeling, more in left. Worse with prolonged standing. Wears compression every day, but even so her legs ache. Does not interfere with sleep. No hx of ulcer or bleeding. Is on coumadin for AF. Also had Maze and porcine MVR. Aching is below the knee. She has a prominent vein left thigh which is not painful. Social History     Socioeconomic History    Marital status:      Spouse name: Not on file    Number of children: Not on file    Years of education: Not on file    Highest education level: Not on file   Occupational History    Not on file   Tobacco Use    Smoking status: Never    Smokeless tobacco: Never   Vaping Use    Vaping Use: Never used   Substance and Sexual Activity    Alcohol use: Never     Comment: occasionally    Drug use: No    Sexual activity: Not on file   Other Topics Concern    Not on file   Social History Narrative    Not on file     Social Determinants of Health     Financial Resource Strain: Low Risk     Difficulty of Paying Living Expenses: Not hard at all   Food Insecurity: No Food Insecurity    Worried About Running Out of Food in the Last Year: Never true    [address] in the Last Year: Never true   Transportation Needs: No Transportation Needs    Lack of Transportation (Medical): No    Lack of Transportation (Non-Medical): No   Physical Activity: Insufficiently Active    Days of Exercise per Week: 3 days    Minutes of Exercise per Session: 30 min   Stress: No Stress Concern Present    Feeling of Stress : Not at all   Social Connections:  Moderately Integrated    Frequency of left shoulder      Current Outpatient Medications   Medication Sig Dispense Refill    omeprazole (PRILOSEC) 20 MG delayed release capsule Take 1 capsule by mouth every morning (before breakfast) 90 capsule 3    metoprolol succinate (TOPROL XL) 25 MG extended release tablet Take 0.5 tablets by mouth daily 45 tablet 3    cycloSPORINE (RESTASIS MULTIDOSE) 0.05 % ophthalmic emulsion Place 1 drop into both eyes 2 times daily      carboxymethylcellulose 1 % ophthalmic solution Place 1 drop into both eyes 3 times daily as needed for Dry Eyes      warfarin (COUMADIN) 3 MG tablet Take 1 tablet by mouth daily As directed by [facility]   Half tablet for 1.5 mg on Sundays and whole 3 mg tablet all other days. (Patient taking differently: Take 3 mg by mouth See Admin Instructions [facility] - 3 mg on Friday and 4.5 mg tablet all other days.) 90 tablet 3    acetaminophen (TYLENOL) 500 MG tablet Take 1,000 mg by mouth every 6 hours as needed for Pain       polyethylene glycol (GLYCOLAX) 17 g packet Take 17 g by mouth daily as needed for Constipation Pt taking 1/2 capful daily      aspirin EC 81 MG EC tablet Take 1 tablet by mouth daily 90 tablet 3    vitamin D (CHOLECALCIFEROL) 1000 UNIT TABS tablet Take 1,000 Units by mouth daily       docusate sodium (COLACE) 100 MG capsule Take 100 mg by mouth daily       Magnesium 400 MG TABS Take 1 tablet by mouth daily        No current facility-administered medications for this visit. Physical findings:  General- no acute distress, oriented  HEENT - no xanthelasma, external ears normal  Neck- Supple, no thyromegaly  Carotids - no carotid bruits  Lungs - Clear to auscultation. CV- irreg irreg, no murmurs rubs or gallops  Abdomen - Non tender, non distended, no bruits  Skin- warm, dry, no skin breakdown or gangrene  Extremities - no edema, varicose veins right shin, left calf, left thigh. Reticular veins on feet.     Pulses Right - Posterior tibial:    2+  Dorsalis pedis: 2+  Radial 2+     Pulses Left -Posterior tibial:    2+  Dorsalis pedis:  2+  Radial 2+      Assessment:  1. Varicose veins of bilateral lower extremities with pain         Plan of care: Will perform bilateral reflux studies and rtc 3 wks. 45 min chart review and pt encounter  Follow up and evaluate.        Electronically signed by Robyn Sorto MD on 8/31/22 at 12:33 PM EDT

## 2022-09-07 ENCOUNTER — HOSPITAL ENCOUNTER (OUTPATIENT)
Dept: VASCULAR LAB | Age: 82
Discharge: HOME | End: 2022-09-09
Payer: MEDICARE

## 2022-09-07 DIAGNOSIS — I83.813 VARICOSE VEINS OF BILATERAL LOWER EXTREMITIES WITH PAIN: ICD-10-CM

## 2022-09-07 PROCEDURE — 93970 EXTREMITY STUDY: CPT

## 2022-09-13 ENCOUNTER — HOSPITAL ENCOUNTER (OUTPATIENT)
Dept: PHARMACY | Age: 82
Setting detail: THERAPIES SERIES
Discharge: HOME | End: 2022-09-13
Payer: MEDICARE

## 2022-09-13 VITALS
BODY MASS INDEX: 23.17 KG/M2 | SYSTOLIC BLOOD PRESSURE: 105 MMHG | DIASTOLIC BLOOD PRESSURE: 46 MMHG | HEART RATE: 49 BPM | WEIGHT: 135 LBS

## 2022-09-13 DIAGNOSIS — Z79.01 ENCOUNTER FOR CURRENT LONG-TERM USE OF ANTICOAGULANTS: Primary | ICD-10-CM

## 2022-09-13 DIAGNOSIS — I48.0 PAROXYSMAL ATRIAL FIBRILLATION (HCC): ICD-10-CM

## 2022-09-13 LAB — INR BLD: 2.4

## 2022-09-13 PROCEDURE — 99211 OFF/OP EST MAY X REQ PHY/QHP: CPT

## 2022-09-13 PROCEDURE — 85610 PROTHROMBIN TIME: CPT

## 2022-09-13 NOTE — PROGRESS NOTES
[facility]  ANTICO[facility]UL[facility]    Referring Provider: Dr. Kennedi Thomas INR: 2-3    TODAY'S INR: 2.4    WARFARIN Dosage: Continue warfarin 3 mg po every Friday; 4.5 mg po all other days    INR (no units)   Date Value   2022 2.4   2022 2.4   06/15/2022 2.2   2022 2.1   2022 2.4   2022 2   2022 2.7       Medication changes:  No changes  Notes:    Fingerstick INR drawn per clinic protocol. Patient states no visible blood in urine and no black tarry stool. Denies any missed doses of warfarin. No change in other maintenance medications or in diet. Will recheck INR in 6 weeks. Patient acknowledges working in consult agreement with pharmacist as referred by his/her physician. For Pharmacy Admin Tracking Only    Intervention Detail: Adherence Monitorin  Total # of Interventions Recommended: 1  Total # of Interventions Accepted: 1  Time Spent (min): KAYCEE Rushing 66.  Davy Castro [facility]

## 2022-09-21 ENCOUNTER — OFFICE VISIT (OUTPATIENT)
Dept: VASCULAR SURGERY | Age: 82
End: 2022-09-21
Payer: MEDICARE

## 2022-09-21 VITALS
HEIGHT: 64 IN | DIASTOLIC BLOOD PRESSURE: 67 MMHG | HEART RATE: 51 BPM | TEMPERATURE: 96.9 F | RESPIRATION RATE: 18 BRPM | WEIGHT: 132 LBS | SYSTOLIC BLOOD PRESSURE: 116 MMHG | BODY MASS INDEX: 22.53 KG/M2

## 2022-09-21 DIAGNOSIS — I83.813 VARICOSE VEINS OF BILATERAL LOWER EXTREMITIES WITH PAIN: Primary | ICD-10-CM

## 2022-09-21 PROCEDURE — 1123F ACP DISCUSS/DSCN MKR DOCD: CPT | Performed by: INTERNAL MEDICINE

## 2022-09-21 PROCEDURE — 99214 OFFICE O/P EST MOD 30 MIN: CPT | Performed by: INTERNAL MEDICINE

## 2022-09-21 NOTE — PROGRESS NOTES
3 days    Minutes of Exercise per Session: 30 min   Stress: No Stress Concern Present    Feeling of Stress : Not at all   Social Connections: Moderately Integrated    Frequency of Communication with Friends and Family: More than three times a week    Frequency of Social Gatherings with Friends and Family: Twice a week    Attends [employer] Services: Never    Active Member of Clubs or Organizations: Yes    Attends Club or Organization Meetings: More than 4 times per year    Marital Status:    Intimate Partner Violence: Not At Risk    Fear of Current or Ex-Partner: No    Emotionally Abused: No    Physically Abused: No    Sexually Abused: No   Housing Stability: Not on file     Family History   Problem Relation Age of Onset    Diabetes Mother     Heart Disease Mother     High Blood Pressure Father     Arthritis Sister     Atrial Fibrillation Sister     Diabetes Brother     Heart Attack Brother         62s    Arthritis Sister      Past Medical History:   Diagnosis Date    Cancer ([facility] [address]     COVID-19 virus infection /   [place] vaccine 2022    Essential hypertension     [facility]    GERD (gastroesophageal reflux disease)     H/O cardiovascular stress test 02/28/2018    Equivocal myocardial perfusion study. There is a small/moderate perufsion defect of mild intensity in the apical an anteroapical regions during stress imaging which is most consistent with artifact but may be due to a small degree of coronary ischemia. Overall, these results are most consistent with a low/intermediate risk for CAD. H/O echocardiogram 02/02/2018    EF 60%. Mildly increased left ventricular wall thickness. No definite specific wall motion abnormalities. Bi-atrial enlargment. Myxomatous thickening of the mitral leaflets with bileaflet prolapse and moderat mitral regurg. Moderate tricuspid regurg. Mild pulmonary HTN with an estimated right ventricualr systolic pressure 21CDQW.      History of Holter monitoring 2016 PACs & PVCs    Irritable bowel syndrome     Mitral valve prolapse     Osteoarthritis     Osteopenia     Paroxysmal atrial fibrillation (HCC) 2018    Rosacea     Tendinitis of left shoulder      Current Outpatient Medications   Medication Sig Dispense Refill    valACYclovir (VALTREX) 1 g tablet Take 1 tablet by mouth 3 times daily for 7 days 21 tablet 0    omeprazole (PRILOSEC) 20 MG delayed release capsule Take 1 capsule by mouth every morning (before breakfast) 90 capsule 3    metoprolol succinate (TOPROL XL) 25 MG extended release tablet Take 0.5 tablets by mouth daily 45 tablet 3    cycloSPORINE (RESTASIS MULTIDOSE) 0.05 % ophthalmic emulsion Place 1 drop into both eyes 2 times daily      carboxymethylcellulose 1 % ophthalmic solution Place 1 drop into both eyes 3 times daily as needed for Dry Eyes      warfarin (COUMADIN) 3 MG tablet Take 1 tablet by mouth daily As directed by [facility] Clinic   Half tablet for 1.5 mg on Sundays and whole 3 mg tablet all other days. (Patient taking differently: Take 3 mg by mouth See Admin Instructions [facility] - 3 mg on Friday and 4.5 mg tablet all other days.) 90 tablet 3    acetaminophen (TYLENOL) 500 MG tablet Take 1,000 mg by mouth every 6 hours as needed for Pain       polyethylene glycol (GLYCOLAX) 17 g packet Take 17 g by mouth daily as needed for Constipation Pt taking 1/2 capful daily      aspirin EC 81 MG EC tablet Take 1 tablet by mouth daily 90 tablet 3    vitamin D (CHOLECALCIFEROL) 1000 UNIT TABS tablet Take 1,000 Units by mouth daily       docusate sodium (COLACE) 100 MG capsule Take 100 mg by mouth daily       Magnesium 400 MG TABS Take 1 tablet by mouth daily        No current facility-administered medications for this visit. Physical findings:  General- no acute distress, oriented  HEENT - no xanthelasma, external ears normal  Neck- Supple, no thyromegaly  Carotids - no carotid bruits  Lungs - Clear to auscultation.   CV- Regular rate and rythym, no murmurs rubs or gallops  Abdomen - Non tender, non distended, no bruits  Skin- warm, dry, no skin breakdown or gangrene  Extremities - no edema, vv both legs    Assessment:  1. Varicose veins of bilateral lower extremities with pain       Varicose veins with reflux interfering with adl and not responsive to prolonged trial of compression. Plan of care:  Left ssv venaseal 10/18/22 8:30  30 min chart review and pt encounter  Follow up and evaluate.        Electronically signed by Queenie Xiong MD on 9/21/22 at 12:27 PM EDT

## 2022-09-21 NOTE — PROGRESS NOTES
Tana Sampson was seen on 9/21/2022 for   Chief Complaint   Patient presents with    Follow-up     Here for results of test   .                            REVIEW OF SYSTEMS    Constitutional Weight: absent, A, Fatigue: absent Fever: absent   HEENT Ears: normal,Visual disturbance: absent Sore throat: absent,    Respiratory Shortness of breath: absent, Cough: absent;, Snoring: absent   Cardivascular Chest pain: absent,  Leg pain: present,Leg swelling:present, Non-healing wound:absent    GI Diarrhea: absent, Abdominal Pain: absent    Urinary frequency: absent, Urinary incontinence: absent   Musculoskeletal Neck pain: absent, Back pain: present, Restless Leg:absent     Dermatological Hair loss: absent, Skin changes: present   Neurological  Sudden Loss of Vision in one eye:absent, Slurred Speech:absent, Weakness on one side of body: absent,Headache: absent   Psychiatric Anxiety: absent, Depression: absent   Hematologic Abnormal bleeding: absent,

## 2022-10-18 ENCOUNTER — HOSPITAL ENCOUNTER (OUTPATIENT)
Dept: VASCULAR LAB | Age: 82
Discharge: HOME | End: 2022-10-20
Payer: MEDICARE

## 2022-10-18 ENCOUNTER — HOSPITAL ENCOUNTER (OUTPATIENT)
Dept: INTERVENTIONAL RADIOLOGY/VASCULAR | Age: 82
Discharge: HOME | End: 2022-10-18
Attending: INTERNAL MEDICINE | Admitting: INTERNAL MEDICINE
Payer: MEDICARE

## 2022-10-18 VITALS
TEMPERATURE: 97.4 F | SYSTOLIC BLOOD PRESSURE: 114 MMHG | HEART RATE: 50 BPM | OXYGEN SATURATION: 97 % | RESPIRATION RATE: 20 BRPM | DIASTOLIC BLOOD PRESSURE: 57 MMHG

## 2022-10-18 DIAGNOSIS — I83.813 VARICOSE VEINS OF BOTH LOWER EXTREMITIES WITH PAIN: Primary | ICD-10-CM

## 2022-10-18 DIAGNOSIS — M79.662 PAIN OF LEFT LOWER LEG: Primary | ICD-10-CM

## 2022-10-18 DIAGNOSIS — I83.819 VARICOSE VEINS WITH PAIN: ICD-10-CM

## 2022-10-18 DIAGNOSIS — I83.813 VARICOSE VEINS OF BILATERAL LOWER EXTREMITIES WITH PAIN: ICD-10-CM

## 2022-10-18 PROCEDURE — 2500000003 HC RX 250 WO HCPCS: Performed by: INTERNAL MEDICINE

## 2022-10-18 PROCEDURE — 36482 ENDOVEN THER CHEM ADHES 1ST: CPT

## 2022-10-18 PROCEDURE — 2500000003 HC RX 250 WO HCPCS

## 2022-10-18 PROCEDURE — 2709999900 IR ULTRASOUND GUIDANCE VASCULAR ACCESS

## 2022-10-18 RX ORDER — LIDOCAINE HYDROCHLORIDE 10 MG/ML
INJECTION, SOLUTION EPIDURAL; INFILTRATION; INTRACAUDAL; PERINEURAL
Status: COMPLETED | OUTPATIENT
Start: 2022-10-18 | End: 2022-10-18

## 2022-10-18 RX ADMIN — LIDOCAINE HYDROCHLORIDE 3 ML: 10 INJECTION, SOLUTION EPIDURAL; INFILTRATION; INTRACAUDAL; PERINEURAL at 09:44

## 2022-10-18 NOTE — PROCEDURES
[address], [place] [address]                            CARDIAC CATHETERIZATION    PATIENT NAME: Rito Omalley                   :        1940  MED REC NO:   [id number]                              ROOM:  ACCOUNT NO:   [de-identified]                           ADMIT DATE: 10/18/2022  PROVIDER:     Ellie Vega    DATE OF PROCEDURE:  10/18/2022    FINAL IMPRESSION:  Successful VenaSeal closure of the left small  saphenous vein. ESTIMATED BLOOD LOSS:  2 mL. PROCEDURE:  Ultrasound-guided left small saphenous vein VenaSeal  closure. METHOD:  Written informed consent was obtained. The left small  saphenous vein was identified at a location in the distal calf. Using  modified Seldinger technique and ultrasound guidance, a micropuncture  needle was placed followed by a micropuncture kit. This was then  followed by a Bentson wire, which was placed at the saphenofemoral  popliteal junction. The micropuncture kit was withdrawn and the  VenaSeal introducer was placed. With the glue catheter approximately 6  cm from the saphenopopliteal junction, compression was applied and  VenaSeal glue was administered according to the IFU. The catheter was  withdrawn. Adequate hemostasis was achieved. Ultrasound at the conclusion of the procedure confirmed closure of the  small saphenous vein with continued patency of the popliteal.    COMMENT:  The patient is an 80-year-old female with severe pain related  to left small saphenous vein reflux. She will be managed with  compression and exercise and will have followup imaging.         Rose Marie Martinez    D: 10/18/2022 10:20:03       T: 10/18/2022 10:24:00     [email or web address]  Job#: [id number]     Doc#: [id number]    CC:

## 2022-10-18 NOTE — SEDATION DOCUMENTATION
Thigh-high ISELA hose applied to left leg. Patient taken to post procedure area and report given to nurse staff.

## 2022-10-18 NOTE — H&P
8/31/22 [address] [place] [address] Vascular visit for VV. Referred by Dr Yeny Bergeron. Has had prior bilateral stripping in [place]. Probably Dr Craig. Had 4 children, all girls, some problems with pregnancies. Had prior injury to legs age 12. No FH of VV. Both legs have heavy, aching feeling, more in left. Worse with prolonged standing. Wears compression every day, but even so her legs ache. Does not interfere with sleep. No hx of ulcer or bleeding. Is on coumadin for AF. Also had Maze and porcine MVR. Aching is below the knee. She has a prominent vein left thigh which is not painful. UPDATE 9/21/22 on 9/7/22 had duplex showing LSSV 85900 in 4.7 mm vein. She is concerned about bleeding from a prominent vein posterior left calf. Duplex did not show other suitable targets. Social History               Socioeconomic History    Marital status:        Spouse name: Not on file    Number of children: Not on file    Years of education: Not on file    Highest education level: Not on file   Occupational History    Not on file   Tobacco Use    Smoking status: Never    Smokeless tobacco: Never   Vaping Use    Vaping Use: Never used   Substance and Sexual Activity    Alcohol use: Never       Comment: occasionally    Drug use: No    Sexual activity: Not on file   Other Topics Concern    Not on file   Social History Narrative    Not on file      Social Determinants of Health          Financial Resource Strain: Low Risk     Difficulty of Paying Living Expenses: Not hard at all   Food Insecurity: No Food Insecurity    Worried About Running Out of Food in the Last Year: Never true    [address] in the Last Year: Never true   Transportation Needs: No Transportation Needs    Lack of Transportation (Medical): No    Lack of Transportation (Non-Medical):  No   Physical Activity: Insufficiently Active    Days of Exercise per Week: 3 days    Minutes of Exercise per Session: 30 min   Stress: No Stress Concern Present    Feeling of Stress : Not at all   Social Connections: Moderately Integrated    Frequency of Communication with Friends and Family: More than three times a week    Frequency of Social Gatherings with Friends and Family: Twice a week    Attends Sikh Services: Never    Active Member of Clubs or Organizations: Yes    Attends Club or Organization Meetings: More than 4 times per year    Marital Status:    Intimate Partner Violence: Not At Risk    Fear of Current or Ex-Partner: No    Emotionally Abused: No    Physically Abused: No    Sexually Abused: No   Housing Stability: Not on file         Family History         Family History   Problem Relation Age of Onset    Diabetes Mother      Heart Disease Mother      High Blood Pressure Father      Arthritis Sister      Atrial Fibrillation Sister      Diabetes Brother      Heart Attack Brother           62s    Arthritis Sister           Past Medical History        Past Medical History:   Diagnosis Date    Cancer ([facility] [address]      COVID-19 virus infection /   [place] vaccine 2022    Essential hypertension      [facility]    GERD (gastroesophageal reflux disease)      H/O cardiovascular stress test 02/28/2018     Equivocal myocardial perfusion study. There is a small/moderate perufsion defect of mild intensity in the apical an anteroapical regions during stress imaging which is most consistent with artifact but may be due to a small degree of coronary ischemia. Overall, these results are most consistent with a low/intermediate risk for CAD. H/O echocardiogram 02/02/2018     EF 60%. Mildly increased left ventricular wall thickness. No definite specific wall motion abnormalities. Bi-atrial enlargment. Myxomatous thickening of the mitral leaflets with bileaflet prolapse and moderat mitral regurg. Moderate tricuspid regurg. Mild pulmonary HTN with an estimated right ventricualr systolic pressure 80PCPD.      History of Holter monitoring 2016     PACs & PVCs    Irritable bowel syndrome Mitral valve prolapse      Osteoarthritis      Osteopenia      Paroxysmal atrial fibrillation ([facility]) 2018    Rosacea      Tendinitis of left shoulder           Current Facility-Administered Medications          Current Outpatient Medications   Medication Sig Dispense Refill    valACYclovir (VALTREX) 1 g tablet Take 1 tablet by mouth 3 times daily for 7 days 21 tablet 0    omeprazole (PRILOSEC) 20 MG delayed release capsule Take 1 capsule by mouth every morning (before breakfast) 90 capsule 3    metoprolol succinate (TOPROL XL) 25 MG extended release tablet Take 0.5 tablets by mouth daily 45 tablet 3    cycloSPORINE (RESTASIS MULTIDOSE) 0.05 % ophthalmic emulsion Place 1 drop into both eyes 2 times daily        carboxymethylcellulose 1 % ophthalmic solution Place 1 drop into both eyes 3 times daily as needed for Dry Eyes        warfarin (COUMADIN) 3 MG tablet Take 1 tablet by mouth daily As directed by [facility] Clinic   Half tablet for 1.5 mg on Sundays and whole 3 mg tablet all other days. (Patient taking differently: Take 3 mg by mouth See Admin Instructions [facility] - 3 mg on Friday and 4.5 mg tablet all other days.) 90 tablet 3    acetaminophen (TYLENOL) 500 MG tablet Take 1,000 mg by mouth every 6 hours as needed for Pain         polyethylene glycol (GLYCOLAX) 17 g packet Take 17 g by mouth daily as needed for Constipation Pt taking 1/2 capful daily        aspirin EC 81 MG EC tablet Take 1 tablet by mouth daily 90 tablet 3    vitamin D (CHOLECALCIFEROL) 1000 UNIT TABS tablet Take 1,000 Units by mouth daily         docusate sodium (COLACE) 100 MG capsule Take 100 mg by mouth daily         Magnesium 400 MG TABS Take 1 tablet by mouth daily           No current facility-administered medications for this visit.                Physical findings:  General- no acute distress, oriented  HEENT - no xanthelasma, external ears normal  Neck- Supple, no thyromegaly  Carotids - no carotid bruits  Lungs - Clear to auscultation. CV- Regular rate and rythym, no murmurs rubs or gallops  Abdomen - Non tender, non distended, no bruits  Skin- warm, dry, no skin breakdown or gangrene  Extremities - no edema, vv both legs     Assessment:  1. Varicose veins of bilateral lower extremities with pain       Varicose veins with reflux interfering with adl and not responsive to prolonged trial of compression.       Plan of care:  Left ssv venaseal 10/18/22 8:30  10/18/22 no changes  Tiffanie Poon MD

## 2022-10-18 NOTE — PLAN OF CARE
Pt given discharge instructions. Pt voices an understanding of these. Pt offers no complaints. Pt dressed for home.

## 2022-10-18 NOTE — DISCHARGE INSTRUCTIONS
Kyle Formica is a treatment to get rid of varicose veins. A chemical is injected into the varicose vein. This causes the vein to close. The procedure may cause some pain. The doctor will inject a local anesthetic. The chemical may cause burning or cramping for a few minutes at the injection site. The procedure can take up to 30 minutes. It depends on how many veins are treated and how big they are. You should walk on your own after the treatment at least 30 minutes tonight. Follow-up care is a key part of your treatment and safety. Be sure to make and go to all appointments follow-up with Dr. Brandan Villela in 2-3 weeks. CALL DR Hill Patel IMMEDIATELY IF YOU EXPERIENCE ANY ITCHING. It's also a good idea to know your test results and keep a list of the medicines you take. Going home  Be sure you have someone to drive you home. You will be given more specific instructions about recovering from your procedure. They will cover things like diet, wound care, follow-up care, driving, and getting back to your normal routine. Resume a regular diet      Wear compression stocking for 48 HOURS CONTINOUSLY. Then during the day for 2 weeks. If necessary you may shower leaving compression hose on and drying thoroughly with hair dryer. If change of stocking is needed, remove soiled stocking and apply clean stocking immediately. (Some chemicals do not require use of stocking. Staff will advise at time of discharge.)  Watch for signs of infection such as:    redness, swelling, drainage and fever. Please notify doctor of these. Follow up vascular studies in 1-2 weeks. When should you call your doctor? You have questions or concerns. Where can you learn more? Go to [email or web address]"[employer]"palomo."[employer]. org and sign in to your [employer] account. Enter C494 in the Mipagar box to learn more about Sclerotherapy: Before Your Procedure.     If you do not have an account, please click on the Sign Up Now link.   © 9234-4736 Healthwise, Incorporated. Care instructions adapted under license by [facility] ([facility]). This care instruction is for use with your licensed healthcare professional. If you have questions about a medical condition or this instruction, always ask your healthcare professional. [address] any warranty or liability for your use of this information. Content Version: 80.5.217263; Current as of: August 21, 2015      .

## 2022-10-18 NOTE — SEDATION DOCUMENTATION
Venous access obtained by doctor left posterior leg, guidewire inserted and placement verified with ultrasound guidance.

## 2022-10-25 ENCOUNTER — HOSPITAL ENCOUNTER (OUTPATIENT)
Dept: PHARMACY | Age: 82
Setting detail: THERAPIES SERIES
Discharge: HOME | End: 2022-10-25
Payer: MEDICARE

## 2022-10-25 VITALS
HEART RATE: 46 BPM | BODY MASS INDEX: 23.07 KG/M2 | DIASTOLIC BLOOD PRESSURE: 55 MMHG | SYSTOLIC BLOOD PRESSURE: 100 MMHG | WEIGHT: 134.4 LBS

## 2022-10-25 DIAGNOSIS — I48.0 PAROXYSMAL ATRIAL FIBRILLATION (HCC): ICD-10-CM

## 2022-10-25 DIAGNOSIS — Z79.01 ENCOUNTER FOR CURRENT LONG-TERM USE OF ANTICOAGULANTS: Primary | ICD-10-CM

## 2022-10-25 LAB — INR BLD: 2.2

## 2022-10-25 PROCEDURE — 99211 OFF/OP EST MAY X REQ PHY/QHP: CPT | Performed by: FAMILY MEDICINE

## 2022-10-25 PROCEDURE — 85610 PROTHROMBIN TIME: CPT | Performed by: FAMILY MEDICINE

## 2022-10-25 NOTE — PROGRESS NOTES
[facility] [address] [place] [address]/[place]  Medication Management  ANTICOAGULATION    Referring Provider: Dr Sarah Landeros INR: 2.0-3.0    TODAY'S INR: 2.2    WARFARIN Dosage: continue warfarin 3 mg F, 4.5mg all other days    INR (no units)   Date Value   10/25/2022 2.2   2022 2.4   2022 2.4   06/15/2022 2.2   2022 2.1   2022 2.4   2022 2       Medication changes:  No changes  Notes:    Fingerstick INR drawn per clinic protocol. Patient states no visible blood in urine and no black tarry stool. Denies any missed doses of warfarin. No change in other maintenance medications or in diet. Will recheck INR in 6 weeks. Patient had shingles in September and was treated with  Valtrex for 7 days. Patient states she currently is having a bit of \"nerve discomfort and itching\" in area on her back where symptoms were greatest.  Patient acknowledges working in consult agreement with pharmacist as referred by his/her physician.                   For Pharmacy Admin Tracking Only    Intervention Detail: Adherence Monitorin  Total # of Interventions Recommended: 2  Total # of Interventions Accepted: 2  Time Spent (min): 20      Xander Villa R.Ph., 10/25/2022,10:16 AM

## 2022-11-09 ENCOUNTER — HOSPITAL ENCOUNTER (OUTPATIENT)
Dept: VASCULAR LAB | Age: 82
Discharge: HOME | End: 2022-11-11
Payer: MEDICARE

## 2022-11-09 ENCOUNTER — OFFICE VISIT (OUTPATIENT)
Dept: VASCULAR SURGERY | Age: 82
End: 2022-11-09
Payer: MEDICARE

## 2022-11-09 VITALS
RESPIRATION RATE: 18 BRPM | HEIGHT: 64 IN | WEIGHT: 135.3 LBS | TEMPERATURE: 97 F | BODY MASS INDEX: 23.1 KG/M2 | HEART RATE: 49 BPM | DIASTOLIC BLOOD PRESSURE: 57 MMHG | SYSTOLIC BLOOD PRESSURE: 106 MMHG

## 2022-11-09 DIAGNOSIS — I83.813 VARICOSE VEINS OF BOTH LOWER EXTREMITIES WITH PAIN: Primary | ICD-10-CM

## 2022-11-09 DIAGNOSIS — M79.662 PAIN OF LEFT LOWER LEG: ICD-10-CM

## 2022-11-09 DIAGNOSIS — I83.813 VARICOSE VEINS OF BILATERAL LOWER EXTREMITIES WITH PAIN: ICD-10-CM

## 2022-11-09 PROCEDURE — 1123F ACP DISCUSS/DSCN MKR DOCD: CPT | Performed by: INTERNAL MEDICINE

## 2022-11-09 PROCEDURE — 93971 EXTREMITY STUDY: CPT

## 2022-11-09 PROCEDURE — 99214 OFFICE O/P EST MOD 30 MIN: CPT | Performed by: INTERNAL MEDICINE

## 2022-11-09 NOTE — PROGRESS NOTES
Veena Napoles was seen on 11/9/2022 for   Chief Complaint   Patient presents with    Follow-up     Post procedure check up   .  8/31/22 1st [facility] visit for VV. Referred by Dr Anjum Monroy. Has had prior bilateral stripping in [place]. Probably Dr Craig. Had 4 children, all girls, some problems with pregnancies. Had prior injury to legs age 12. No FH of VV. Both legs have heavy, aching feeling, more in left. Worse with prolonged standing. Wears compression every day, but even so her legs ache. Does not interfere with sleep. No hx of ulcer or bleeding. Is on coumadin for AF. Also had Maze and porcine MVR. Aching is below the knee. She has a prominent vein left thigh which is not painful. UPDATE 9/21/22 on 9/7/22 had duplex showing LSSV 84181 in 4.7 mm vein. She is concerned about bleeding from a prominent vein posterior left calf. Duplex did not show other suitable targets. UPDATE 11/9/22 On 10/18/22 had LSSV venaseal. She did well with the procedure, and feels that her leg is somewhat better. Not as heavy. She still notes distended vv in posterior calf that cause only mild discomfort. Duplex today shows closure of mid and distal ssv.      Social History     Socioeconomic History    Marital status:      Spouse name: Not on file    Number of children: Not on file    Years of education: Not on file    Highest education level: Not on file   Occupational History    Not on file   Tobacco Use    Smoking status: Never    Smokeless tobacco: Never   Vaping Use    Vaping Use: Never used   Substance and Sexual Activity    Alcohol use: Never     Comment: occasionally    Drug use: No    Sexual activity: Not on file   Other Topics Concern    Not on file   Social History Narrative    Not on file     Social Determinants of Health     Financial Resource Strain: Low Risk     Difficulty of Paying Living Expenses: Not hard at all   Food Insecurity: No Food Insecurity    Worried About [address] in the Last Year: Never true    [address] in the Last Year: Never true   Transportation Needs: No Transportation Needs    Lack of Transportation (Medical): No    Lack of Transportation (Non-Medical): No   Physical Activity: Insufficiently Active    Days of Exercise per Week: 3 days    Minutes of Exercise per Session: 30 min   Stress: No Stress Concern Present    Feeling of Stress : Not at all   Social Connections: Moderately Integrated    Frequency of Communication with Friends and Family: More than three times a week    Frequency of Social Gatherings with Friends and Family: Twice a week    Attends [employer] Services: Never    Active Member of Clubs or Organizations: Yes    Attends Club or Organization Meetings: More than 4 times per year    Marital Status:    Intimate Partner Violence: Not At Risk    Fear of Current or Ex-Partner: No    Emotionally Abused: No    Physically Abused: No    Sexually Abused: No   Housing Stability: Not on file     Family History   Problem Relation Age of Onset    Diabetes Mother     Heart Disease Mother     High Blood Pressure Father     Arthritis Sister     Atrial Fibrillation Sister     Diabetes Brother     Heart Attack Brother         62s    Arthritis Sister      Past Medical History:   Diagnosis Date    [facility]     COVID-19 virus infection /   Derinda Cheung vaccine 2022    Essential hypertension     Fracture of sacrum ([facility] 2011    GERD (gastroesophageal reflux disease)     H/O cardiovascular stress test 02/28/2018    Equivocal myocardial perfusion study. There is a small/moderate perufsion defect of mild intensity in the apical an anteroapical regions during stress imaging which is most consistent with artifact but may be due to a small degree of coronary ischemia. Overall, these results are most consistent with a low/intermediate risk for CAD. H/O echocardiogram 02/02/2018    EF 60%. Mildly increased left ventricular wall thickness.  No definite specific wall motion abnormalities. Bi-atrial enlargment. Myxomatous thickening of the mitral leaflets with bileaflet prolapse and moderat mitral regurg. Moderate tricuspid regurg. Mild pulmonary HTN with an estimated right ventricualr systolic pressure 27POTC. History of Holter monitoring 2016    PACs & PVCs    Irritable bowel syndrome     Mitral valve prolapse     Osteoarthritis     Osteopenia     Paroxysmal atrial fibrillation (HCC) 2018    Rosacea     Tendinitis of left shoulder      Current Outpatient Medications   Medication Sig Dispense Refill    omeprazole (PRILOSEC) 20 MG delayed release capsule Take 1 capsule by mouth every morning (before breakfast) 90 capsule 3    metoprolol succinate (TOPROL XL) 25 MG extended release tablet Take 0.5 tablets by mouth daily 45 tablet 3    cycloSPORINE (RESTASIS MULTIDOSE) 0.05 % ophthalmic emulsion Place 1 drop into both eyes 2 times daily      carboxymethylcellulose 1 % ophthalmic solution Place 1 drop into both eyes 3 times daily as needed for Dry Eyes      warfarin (COUMADIN) 3 MG tablet Take 1 tablet by mouth daily As directed by [facility]   Half tablet for 1.5 mg on Sundays and whole 3 mg tablet all other days. (Patient taking differently: Take 3 mg by mouth See Admin Instructions [facility] - 3 mg on Friday and 4.5 mg tablet all other days.) 90 tablet 3    acetaminophen (TYLENOL) 500 MG tablet Take 1,000 mg by mouth every 6 hours as needed for Pain      polyethylene glycol (GLYCOLAX) 17 g packet Take 17 g by mouth daily as needed for Constipation Pt taking 1/2 capful daily      aspirin EC 81 MG EC tablet Take 1 tablet by mouth daily 90 tablet 3    vitamin D (CHOLECALCIFEROL) 1000 UNIT TABS tablet Take 1,000 Units by mouth daily       docusate sodium (COLACE) 100 MG capsule Take 100 mg by mouth daily       Magnesium 400 MG TABS Take 1 tablet by mouth daily        No current facility-administered medications for this visit.          Physical findings:  General- no acute distress, oriented  HEENT - no xanthelasma, external ears normal  Neck- Supple, no thyromegaly  Skin- warm, dry, no skin breakdown or gangrene  Extremities - no edema, mild vv in upper calf still compressible      Assessment:  1. Varicose veins of both lower extremities with pain       Improvement after venaseal, both in terms of sx and appearance  Still has some residual small vv in upper calf region which seem too insignificant to treat (would be amenable to foam)  Plan of care:  RTC 1 YR  30 min chart reivew and pt encounter  Follow up and evaluate.        Electronically signed by Pete Amato MD on 11/9/22 at 1:29 PM EST

## 2022-11-09 NOTE — PROGRESS NOTES
Keyon Avilez was seen on 11/9/2022 for   Chief Complaint   Patient presents with    Follow-up     Post procedure check up   .                             REVIEW OF SYSTEMS    Constitutional Weight: absent, A, Fatigue: absent Fever: absent   HEENT Ears: normal,Visual disturbance: absent Sore throat: absent,    Respiratory Shortness of breath: absent, Cough: absent;, Snoring: absent   Cardivascular Chest pain: absent,  Leg pain: absent,Leg swelling:absent, Non-healing wound:absent    GI Diarrhea: absent, Abdominal Pain: absent    Urinary frequency: absent, Urinary incontinence: absent   Musculoskeletal Neck pain: absent, Back pain: absent, Restless Leg:absent     Dermatological Hair loss: absent, Skin changes: absent   Neurological  Sudden Loss of Vision in one eye:absent, Slurred Speech:absent, Weakness on one side of body: absent,Headache: absent   Psychiatric Anxiety: absent, Depression: absent   Hematologic Abnormal bleeding: absent,

## 2022-11-09 NOTE — PATIENT INSTRUCTIONS
SURVEY:    You may be receiving a survey from [employer] regarding your visit today. Please complete the survey to enable us to provide the highest quality of care to you and your family. If you cannot score us a very good on any question, please call the office to discuss how we could have made your experience a very good one. Thank you.

## 2022-12-06 ENCOUNTER — HOSPITAL ENCOUNTER (OUTPATIENT)
Dept: PHARMACY | Age: 82
Setting detail: THERAPIES SERIES
Discharge: HOME | End: 2022-12-06
Payer: MEDICARE

## 2022-12-06 VITALS — WEIGHT: 135 LBS | BODY MASS INDEX: 23.17 KG/M2

## 2022-12-06 DIAGNOSIS — I48.0 PAROXYSMAL ATRIAL FIBRILLATION (HCC): ICD-10-CM

## 2022-12-06 DIAGNOSIS — Z79.01 ENCOUNTER FOR CURRENT LONG-TERM USE OF ANTICOAGULANTS: Primary | ICD-10-CM

## 2022-12-06 LAB — INR BLD: 2.4

## 2022-12-06 PROCEDURE — 99211 OFF/OP EST MAY X REQ PHY/QHP: CPT

## 2022-12-06 PROCEDURE — 85610 PROTHROMBIN TIME: CPT

## 2022-12-06 NOTE — PROGRESS NOTES
[address] [facility]  Medication Management  ANTICOAGULATION    Referring Provider: Dr. Andrew Reagan INR: 2-3    TODAY'S INR: 2.4    WARFARIN Dosage: Continue warfarin 3 mg po every Friday; 4.5 mg po all other days    INR (no units)   Date Value   2022 2.4   10/25/2022 2.2   2022 2.4   2022 2.4   06/15/2022 2.2   2022 2.1   2022 2.4       Medication changes:  No changes  Notes:    Fingerstick INR drawn per clinic protocol. Patient states no visible blood in urine and no black tarry stool. Denies any missed doses of warfarin. No change in other maintenance medications or in diet. Will recheck INR in 6 weeks. Patient acknowledges working in consult agreement with pharmacist as referred by his/her physician. For Pharmacy Admin Tracking Only    Intervention Detail: Adherence Monitorin  Total # of Interventions Recommended: 1  Total # of Interventions Accepted: 1  Time Spent (min): Aranza Gorman, [facility]

## 2022-12-31 ENCOUNTER — APPOINTMENT (OUTPATIENT)
Dept: GENERAL RADIOLOGY | Age: 82
End: 2022-12-31
Payer: MEDICARE

## 2022-12-31 ENCOUNTER — HOSPITAL ENCOUNTER (EMERGENCY)
Age: 82
Discharge: HOME | End: 2022-12-31
Payer: MEDICARE

## 2022-12-31 VITALS
TEMPERATURE: 97.7 F | HEART RATE: 71 BPM | SYSTOLIC BLOOD PRESSURE: 109 MMHG | RESPIRATION RATE: 19 BRPM | DIASTOLIC BLOOD PRESSURE: 54 MMHG | OXYGEN SATURATION: 94 %

## 2022-12-31 DIAGNOSIS — I48.3 TYPICAL ATRIAL FLUTTER (HCC): Primary | ICD-10-CM

## 2022-12-31 LAB
ABSOLUTE EOS #: 0.1 K/UL (ref 0–0.44)
ABSOLUTE IMMATURE GRANULOCYTE: <0.03 K/UL (ref 0–0.3)
ABSOLUTE LYMPH #: 1.13 K/UL (ref 1.1–3.7)
ABSOLUTE MONO #: 0.48 K/UL (ref 0.1–1.2)
ALBUMIN SERPL-MCNC: 4 G/DL (ref 3.5–5.2)
ALBUMIN/GLOBULIN RATIO: 1.6 (ref 1–2.5)
ALP BLD-CCNC: 96 U/L (ref 35–104)
ALT SERPL-CCNC: 16 U/L (ref 5–33)
ANION GAP SERPL CALCULATED.3IONS-SCNC: 9 MMOL/L (ref 9–17)
AST SERPL-CCNC: 25 U/L
BASOPHILS # BLD: 1 % (ref 0–2)
BASOPHILS ABSOLUTE: 0.05 K/UL (ref 0–0.2)
BILIRUB SERPL-MCNC: 0.5 MG/DL (ref 0.3–1.2)
BUN BLDV-MCNC: 13 MG/DL (ref 8–23)
BUN/CREAT BLD: 20 (ref 9–20)
CALCIUM SERPL-MCNC: 9.4 MG/DL (ref 8.6–10.4)
CHLORIDE BLD-SCNC: 100 MMOL/L (ref 98–107)
CO2: 27 MMOL/L (ref 20–31)
CREAT SERPL-MCNC: 0.64 MG/DL (ref 0.5–0.9)
EKG ATRIAL RATE: 264 BPM
EKG ATRIAL RATE: 264 BPM
EKG ATRIAL RATE: 63 BPM
EKG P AXIS: -152 DEGREES
EKG P AXIS: 163 DEGREES
EKG P AXIS: 57 DEGREES
EKG P-R INTERVAL: 156 MS
EKG Q-T INTERVAL: 298 MS
EKG Q-T INTERVAL: 384 MS
EKG Q-T INTERVAL: 402 MS
EKG QRS DURATION: 92 MS
EKG QRS DURATION: 94 MS
EKG QRS DURATION: 94 MS
EKG QTC CALCULATION (BAZETT): 402 MS
EKG QTC CALCULATION (BAZETT): 411 MS
EKG QTC CALCULATION (BAZETT): 441 MS
EKG R AXIS: 0 DEGREES
EKG R AXIS: 1 DEGREES
EKG R AXIS: 13 DEGREES
EKG T AXIS: 16 DEGREES
EKG T AXIS: 25 DEGREES
EKG T AXIS: 35 DEGREES
EKG VENTRICULAR RATE: 132 BPM
EKG VENTRICULAR RATE: 63 BPM
EKG VENTRICULAR RATE: 66 BPM
EOSINOPHILS RELATIVE PERCENT: 2 % (ref 1–4)
GFR SERPL CREATININE-BSD FRML MDRD: >60 ML/MIN/1.73M2
GLUCOSE BLD-MCNC: 99 MG/DL (ref 70–99)
HCT VFR BLD CALC: 37.5 % (ref 36.3–47.1)
HEMOGLOBIN: 12.8 G/DL (ref 11.9–15.1)
IMMATURE GRANULOCYTES: 0 %
LYMPHOCYTES # BLD: 27 % (ref 24–43)
MCH RBC QN AUTO: 35.9 PG (ref 25.2–33.5)
MCHC RBC AUTO-ENTMCNC: 34.1 G/DL (ref 28.4–34.8)
MCV RBC AUTO: 105 FL (ref 82.6–102.9)
MONOCYTES # BLD: 12 % (ref 3–12)
NRBC AUTOMATED: 0 PER 100 WBC
PDW BLD-RTO: 13 % (ref 11.8–14.4)
PLATELET # BLD: 162 K/UL (ref 138–453)
PMV BLD AUTO: 9.8 FL (ref 8.1–13.5)
POTASSIUM SERPL-SCNC: 4.1 MMOL/L (ref 3.7–5.3)
RBC # BLD: 3.57 M/UL (ref 3.95–5.11)
SEG NEUTROPHILS: 58 % (ref 36–65)
SEGMENTED NEUTROPHILS ABSOLUTE COUNT: 2.38 K/UL (ref 1.5–8.1)
SODIUM BLD-SCNC: 136 MMOL/L (ref 135–144)
TOTAL PROTEIN: 6.5 G/DL (ref 6.4–8.3)
TROPONIN, HIGH SENSITIVITY: 12 NG/L (ref 0–14)
WBC # BLD: 4.2 K/UL (ref 3.5–11.3)

## 2022-12-31 PROCEDURE — 71045 X-RAY EXAM CHEST 1 VIEW: CPT

## 2022-12-31 PROCEDURE — 2500000003 HC RX 250 WO HCPCS: Performed by: PHYSICIAN ASSISTANT

## 2022-12-31 PROCEDURE — 36415 COLL VENOUS BLD VENIPUNCTURE: CPT

## 2022-12-31 PROCEDURE — 96374 THER/PROPH/DIAG INJ IV PUSH: CPT

## 2022-12-31 PROCEDURE — 84484 ASSAY OF TROPONIN QUANT: CPT

## 2022-12-31 PROCEDURE — 93005 ELECTROCARDIOGRAM TRACING: CPT | Performed by: PHYSICIAN ASSISTANT

## 2022-12-31 PROCEDURE — 6370000000 HC RX 637 (ALT 250 FOR IP): Performed by: PHYSICIAN ASSISTANT

## 2022-12-31 PROCEDURE — 2580000003 HC RX 258: Performed by: PHYSICIAN ASSISTANT

## 2022-12-31 PROCEDURE — 92960 CARDIOVERSION ELECTRIC EXT: CPT

## 2022-12-31 PROCEDURE — 85025 COMPLETE CBC W/AUTO DIFF WBC: CPT

## 2022-12-31 PROCEDURE — 93005 ELECTROCARDIOGRAM TRACING: CPT | Performed by: EMERGENCY MEDICINE

## 2022-12-31 PROCEDURE — 93010 ELECTROCARDIOGRAM REPORT: CPT | Performed by: INTERNAL MEDICINE

## 2022-12-31 PROCEDURE — 96375 TX/PRO/DX INJ NEW DRUG ADDON: CPT

## 2022-12-31 PROCEDURE — 80053 COMPREHEN METABOLIC PANEL: CPT

## 2022-12-31 PROCEDURE — 99285 EMERGENCY DEPT VISIT HI MDM: CPT

## 2022-12-31 PROCEDURE — 6360000002 HC RX W HCPCS: Performed by: EMERGENCY MEDICINE

## 2022-12-31 RX ORDER — METOPROLOL SUCCINATE 25 MG/1
12.5 TABLET, EXTENDED RELEASE ORAL ONCE
Status: COMPLETED | OUTPATIENT
Start: 2022-12-31 | End: 2022-12-31

## 2022-12-31 RX ORDER — 0.9 % SODIUM CHLORIDE 0.9 %
500 INTRAVENOUS SOLUTION INTRAVENOUS ONCE
Status: COMPLETED | OUTPATIENT
Start: 2022-12-31 | End: 2022-12-31

## 2022-12-31 RX ORDER — FENTANYL CITRATE 50 UG/ML
25 INJECTION, SOLUTION INTRAMUSCULAR; INTRAVENOUS ONCE
Status: COMPLETED | OUTPATIENT
Start: 2022-12-31 | End: 2022-12-31

## 2022-12-31 RX ORDER — MIDAZOLAM HYDROCHLORIDE 2 MG/2ML
1 INJECTION, SOLUTION INTRAMUSCULAR; INTRAVENOUS ONCE
Status: COMPLETED | OUTPATIENT
Start: 2022-12-31 | End: 2022-12-31

## 2022-12-31 RX ORDER — DILTIAZEM HYDROCHLORIDE 5 MG/ML
10 INJECTION INTRAVENOUS ONCE
Status: COMPLETED | OUTPATIENT
Start: 2022-12-31 | End: 2022-12-31

## 2022-12-31 RX ADMIN — Medication 25 MCG: at 16:05

## 2022-12-31 RX ADMIN — SODIUM CHLORIDE 500 ML: 9 INJECTION, SOLUTION INTRAVENOUS at 12:32

## 2022-12-31 RX ADMIN — DILTIAZEM HYDROCHLORIDE 10 MG: 5 INJECTION, SOLUTION INTRAVENOUS at 11:26

## 2022-12-31 RX ADMIN — METOPROLOL SUCCINATE 12.5 MG: 25 TABLET, EXTENDED RELEASE ORAL at 13:51

## 2022-12-31 RX ADMIN — MIDAZOLAM HYDROCHLORIDE 1 MG: 1 INJECTION, SOLUTION INTRAMUSCULAR; INTRAVENOUS at 16:06

## 2022-12-31 ASSESSMENT — PAIN DESCRIPTION - DESCRIPTORS: DESCRIPTORS: PRESSURE

## 2022-12-31 ASSESSMENT — PAIN SCALES - GENERAL
PAINLEVEL_OUTOF10: 2
PAINLEVEL_OUTOF10: 0

## 2022-12-31 ASSESSMENT — PAIN DESCRIPTION - LOCATION: LOCATION: CHEST

## 2022-12-31 ASSESSMENT — PAIN - FUNCTIONAL ASSESSMENT: PAIN_FUNCTIONAL_ASSESSMENT: 0-10

## 2022-12-31 NOTE — DISCHARGE INSTRUCTIONS
Follow-up with your cardiologist on Tuesday. Continue home medications as prescribed. Promptly return to emergency department for new, changing, worsening of symptoms or other concerns.

## 2022-12-31 NOTE — ED PROVIDER NOTES
Care primarily provided by [facility]. However, after discussion with cardiologist, Dr. Hoda Christianson, the recommendation was for cardioversion. I became involved out of the need for sedation. Patient was evaluated, comprehensive anesthesia history was obtained and airway evaluation was performed. Patient was deemed safe for sedation and cardioversion, performed as noted below.     Cardiovert / Defib    Date/Time: 12/31/2022 5:20 PM  Performed by: Jose Guadalupe Cota MD  Authorized by: Jose Guadalupe Cota MD     Consent:     Consent obtained:  Written    Consent given by:  Patient    Alternatives discussed:  Rate-control medication  Pre-procedure details:     Cardioversion basis:  Elective    Rhythm:  Atrial flutter    Electrode placement:  Anterior-posterior  Attempt one:     Cardioversion mode:  Synchronous    Waveform:  Biphasic    Shock (Joules):  150    Shock outcome:  Conversion to normal sinus rhythm  Post-procedure details:     Patient status:  Awake    Procedure completion:  Tolerated well, no immediate complications  Procedural sedation    Date/Time: 12/31/2022 5:20 PM  Performed by: Jose Guadalupe Cota MD  Authorized by: Jose Guadalupe Cota MD     Consent:     Consent obtained:  Written    Consent given by:  Patient  Indications:     Procedure performed:  Cardioversion    Procedure necessitating sedation performed by:  Physician performing sedation    Intended level of sedation:  Moderate  Pre-sedation assessment:     Time since last food or drink:  0800    Mallampati score:  I - soft palate, uvula, fauces, pillars visible    Neck mobility: normal      Pre-sedation assessments completed and reviewed: airway patency, anesthesia/sedation history, cardiovascular function, hydration status, mental status, nausea/vomiting and respiratory function      History of difficult intubation: no    Immediate pre-procedure details:     Reassessment: Patient reassessed immediately prior to procedure    Procedure details (see MAR for exact dosages): Preoxygenation:  Room air    Sedation:  Midazolam    Analgesia:  Fentanyl    Intra-procedure monitoring:  Blood pressure monitoring, cardiac monitor, continuous capnometry, continuous pulse oximetry and frequent LOC assessments    Intra-procedure events: none    Post-procedure details:     Attendance: Constant attendance by certified staff until patient recovered      Recovery: Patient returned to pre-procedure baseline      Patient is stable for discharge or admission: yes      Procedure completion:  Tolerated well, no immediate complications       Jarrell Vigil MD  12/31/22 [id number]

## 2022-12-31 NOTE — ED PROVIDER NOTES
[facility]  EMERGENCY DEPARTMENT ENCOUNTER      Pt Name: Nathaly Ferrari  MRN: [id number]  [place] 1940  Date of evaluation: 12/31/2022  Provider: Yajaira Restrepo PA-C    CHIEF COMPLAINT       Chief Complaint   Patient presents with    Tachycardia     Pt reports afib hx. Pt has had multiple episodes of tachycardia          HISTORY OF PRESENT ILLNESS   (Location/Symptom, Timing/Onset, Context/Setting, Quality, Duration, Modifying Factors, Severity)  Note limiting factors. Nathaly Ferrari is a 80 y.o. female who presents to the emergency department PMH A-flutter history of tachybradycardia syndrome per patient said by cardiologist Dr. Eric Jasso chronically anticoagulated reports 2-day history of rapid heart rate feeling with associated mild shortness of breath and nausea. Patient reports has been compliant with her metoprolol daily well as her other home medications. She admits to nonradiating chest pressure rated 2 out of 10 pain. Patient denies syncopal events lightheadedness history of fever cough vomiting chest pain or other complaints. She reports she underwent a maze procedure 2 years ago at [place]. No other symptoms noted, patient has no additional complaints at present initial EKG demonstrates atrial flutter with a rate of 132 bpm.    HPI    Nursing Notes were reviewed. REVIEW OF SYSTEMS    (2-9 systems for level 4, 10 or more for level 5)     Review of Systems   Constitutional: Negative. Negative for fatigue. HENT: Negative. Respiratory:  Positive for shortness of breath. Negative for cough. See hpi   Cardiovascular:         See hpi   Gastrointestinal:  Positive for nausea. Negative for vomiting. See hpi   Musculoskeletal: Negative. Skin: Negative. Neurological: Negative. Psychiatric/Behavioral: Negative. Except as noted above the remainder of the review of systems was reviewed and negative.        PAST MEDICAL HISTORY     Past Medical History: Diagnosis Date    Cancer [facility])     COVID-19 virus infection /   Clarene Etta vaccine 2022    Essential hypertension     Fracture of sacrum ([address] 2011    GERD (gastroesophageal reflux disease)     H/O cardiovascular stress test 02/28/2018    Equivocal myocardial perfusion study. There is a small/moderate perufsion defect of mild intensity in the apical an anteroapical regions during stress imaging which is most consistent with artifact but may be due to a small degree of coronary ischemia. Overall, these results are most consistent with a low/intermediate risk for CAD. H/O echocardiogram 02/02/2018    EF 60%. Mildly increased left ventricular wall thickness. No definite specific wall motion abnormalities. Bi-atrial enlargment. Myxomatous thickening of the mitral leaflets with bileaflet prolapse and moderat mitral regurg. Moderate tricuspid regurg. Mild pulmonary HTN with an estimated right ventricualr systolic pressure [id number].      History of Holter monitoring 2016    PACs & PVCs    Irritable bowel syndrome     Mitral valve prolapse     Osteoarthritis     Osteopenia     Paroxysmal atrial fibrillation ([address] 2018    Rosacea     Tendinitis of left shoulder          SURGICAL HISTORY       Past Surgical History:   Procedure Laterality Date    BUNIONECTOMY      CARDIAC CATHETERIZATION Left 07/02/2020    DR Gross/[facility]/right radial-    COLONOSCOPY  2008    CYSTOCELE REPAIR      ENDOSCOPY, COLON, DIAGNOSTIC      EYE SURGERY      HERNIA REPAIR      HYSTERECTOMY (CERVIX STATUS UNKNOWN)      MOHS SURGERY  2008    SKIN BIOPSY      LUBA AND BSO (CERVIX REMOVED) Bilateral 2014    TRANSESOPHAGEAL ECHOCARDIOGRAM  07/02/2020    Dr Charley Telles/Unsuccessful attempt    TRANSESOPHAGEAL ECHOCARDIOGRAM  07/09/2020    Dr Mae Telles/Failed, anesthesia sedate    UPPER GASTROINTESTINAL ENDOSCOPY N/A 12/10/2019    -antral erythema,bx(mild chronic inactive gastritis with focal intestinal metaplasia,neg H-Pylori)dilation    UPPER GASTROINTESTINAL ENDOSCOPY  12/10/2019    EGD DILATION SAVORY performed by Marielos Mina MD at [address] [facility] SURGERY           CURRENT MEDICATIONS       Previous Medications    ACETAMINOPHEN (TYLENOL) 500 MG TABLET    Take 1,000 mg by mouth every 6 hours as needed for Pain    ASPIRIN EC 81 MG EC TABLET    Take 1 tablet by mouth daily    CARBOXYMETHYLCELLULOSE 1 % OPHTHALMIC SOLUTION    Place 1 drop into both eyes 3 times daily as needed for Dry Eyes    CYCLOSPORINE (RESTASIS MULTIDOSE) 0.05 % OPHTHALMIC EMULSION    Place 1 drop into both eyes 2 times daily    DOCUSATE SODIUM (COLACE) 100 MG CAPSULE    Take 100 mg by mouth daily     MAGNESIUM 400 MG TABS    Take 1 tablet by mouth daily     METOPROLOL SUCCINATE (TOPROL XL) 25 MG EXTENDED RELEASE TABLET    Take 0.5 tablets by mouth daily    OMEPRAZOLE (PRILOSEC) 20 MG DELAYED RELEASE CAPSULE    Take 1 capsule by mouth every morning (before breakfast)    POLYETHYLENE GLYCOL (GLYCOLAX) 17 G PACKET    Take 17 g by mouth daily as needed for Constipation Pt taking 1/2 capful daily    VITAMIN D (CHOLECALCIFEROL) 1000 UNIT TABS TABLET    Take 1,000 Units by mouth daily     WARFARIN (COUMADIN) 3 MG TABLET    Take 1 tablet by mouth daily As directed by [facility]   Half tablet for 1.5 mg on Sundays and whole 3 mg tablet all other days.        ALLERGIES     Butalbital-aspirin-caffeine and Other    FAMILY HISTORY       Family History   Problem Relation Age of Onset    Diabetes Mother     Heart Disease Mother     High Blood Pressure Father     Arthritis Sister     Atrial Fibrillation Sister     Diabetes Brother     Heart Attack Brother         62s    Arthritis Sister           SOCIAL HISTORY       Social History     Socioeconomic History    Marital status:    Tobacco Use    Smoking status: Never    Smokeless tobacco: Never   Vaping Use    Vaping Use: Never used Substance and Sexual Activity    Alcohol use: Never     Comment: occasionally    Drug use: No     Social Determinants of Health     Financial Resource Strain: Low Risk     Difficulty of Paying Living Expenses: Not hard at all   Food Insecurity: No Food Insecurity    Worried About [address] in the Last Year: Never true    [address] in the Last Year: Never true   Transportation Needs: No Transportation Needs    Lack of Transportation (Medical): No    Lack of Transportation (Non-Medical): No   Physical Activity: Insufficiently Active    Days of Exercise per Week: 3 days    Minutes of Exercise per Session: 30 min   Stress: No Stress Concern Present    Feeling of Stress : Not at all   Social Connections: Moderately Integrated    Frequency of Communication with Friends and Family: More than three times a week    Frequency of Social Gatherings with Friends and Family: Twice a week    Attends [employer] Services: Never    Active Member of Clubs or Organizations: Yes    Attends Club or Organization Meetings: More than 4 times per year    Marital Status:    Intimate Partner Violence: Not At Risk    Fear of Current or Ex-Partner: No    Emotionally Abused: No    Physically Abused: No    Sexually Abused: No       SCREENINGS         Pierz Coma Scale  Eye Opening: Spontaneous  Best Verbal Response: Oriented  Best Motor Response: Obeys commands  Pierz Coma Scale Score: 15                     CIWA Assessment  BP: (!) 100/53  Heart Rate: 73                 PHYSICAL EXAM    (up to 7 for level 4, 8 or more for level 5)     ED Triage Vitals [12/31/22 1056]   BP Temp Temp Source Heart Rate Resp SpO2 Height Weight   121/83 97.7 °F (36.5 °C) Tympanic (!) 132 18 98 % -- --       Physical Exam  Vitals and nursing note reviewed. Constitutional:       General: She is not in acute distress. Appearance: Normal appearance. She is not ill-appearing, toxic-appearing or diaphoretic.    HENT:      Head: Normocephalic and atraumatic. Nose: Nose normal.   Eyes:      Extraocular Movements: Extraocular movements intact. Cardiovascular:      Rate and Rhythm: Tachycardia present. Rhythm irregular. Pulses: Normal pulses. Pulmonary:      Effort: Pulmonary effort is normal.      Breath sounds: Normal breath sounds. Abdominal:      Palpations: Abdomen is soft. Musculoskeletal:         General: Normal range of motion. Skin:     General: Skin is warm and dry. Capillary Refill: Capillary refill takes less than 2 seconds. Neurological:      General: No focal deficit present. Mental Status: She is alert and oriented to person, place, and time. Mental status is at baseline. Psychiatric:         Mood and Affect: Mood normal.         Behavior: Behavior normal.       DIAGNOSTIC RESULTS     EKG: All EKG's are interpreted by the Emergency Department Physician who either signs or Co-signs this chart in the absence of a cardiologist.    EKG demonstrates a flutter ventricular rate 132 QT/QTc 294/441    Repeat EKG after 10 mg of diltiazem demonstrates a flutter ventricular rate 66 no acute ST changes QT/QTc 384/402      RADIOLOGY:   Non-plain film images such as CT, Ultrasound and MRI are read by the radiologist. Plain radiographic images are visualized and preliminarily interpreted by the emergency physician with the below findings:        Interpretation per the Radiologist below, if available at the time of this note:    XR CHEST PORTABLE   Final Result   Evidence of prior CABG. No acute process.                ED BEDSIDE ULTRASOUND:   Performed by ED Physician - none    LABS:  Labs Reviewed   CBC WITH AUTO DIFFERENTIAL - Abnormal; Notable for the following components:       Result Value    RBC 3.57 (*)     .0 (*)     MCH 35.9 (*)     All other components within normal limits   COMPREHENSIVE METABOLIC PANEL   TROPONIN       All other labs were within normal range or not returned as of this dictation. EMERGENCY DEPARTMENT COURSE and DIFFERENTIAL DIAGNOSIS/MDM:   Vitals:    Vitals:    12/31/22 1645 12/31/22 1728 12/31/22 1729 12/31/22 1730   BP: 87/65 (!) 100/53     Pulse: 78 77 69 73   Resp: 19 16 14 17   Temp:       TempSrc:       SpO2: 97% 94% 96% 97%           MDM     Amount and/or Complexity of Data Reviewed  Clinical lab tests: ordered and reviewed  Tests in the radiology section of CPT®: ordered and reviewed          REASSESSMENT      Patient has had uncomplicated ER course patient reevaluated at 1150 hrs. patient's heart rate has improved after 10 mg of Cardizem. Patient states she feels better as she now denies any chest discomfort\pressure sensation. 1340 hrs. upon discharge preparations patient heart rate accelerated back to the 100s. Patient will be given a dose of metoprolol 12.5 p.o. and reevaluated. Patient reevaluated 1502 hrs. patient states she feels better once again tachycardia yet patient's heart rate has again accelerated to 130s. Patient reevaluated 1525 hrs. patient reports she feels fine at this time she is alert and oriented x4. Nursing reports patient ambulated throughout emergency department without difficulty. Discussed plan with patient as she is to continue home medicines as prescribed, return precautions discussed. CRITICAL CARE TIME   Total Critical Care time was  minutes, excluding separately reportable procedures. There was a high probability of clinically significant/life threatening deterioration in the patient's condition which required my urgent intervention. CONSULTS:  This case with cardiologist Dr. Cesia Ambriz 1224 hrs. including history clinical exam EKG findings laboratory imaging findings interventions in emergency department who notes to increase patient's dose of metoprolol to 1 full 25 mg tab a day as opposed to a half a tablet per day and have her follow-up with Dr. Nagel Courser.     Discussed case again with Dr. Cesia Ambriz 1518 hrs. noting upon discharge preparation patient's heart rate accelerated again into the 130s. He and ER attending Dr. Tracy Lou discussed cardioversion as we feel chemical conversion would not be successful. Plan will be to cardiovert patient here in the emergency department. Discussed case again with Dr. Cesia Ambriz 440 2168 hrs. noting successful cardioversion patient is in sinus rhythm bigeminy with PVCs occasionally. We discussed again patient's patient's medication doses we have elected not to increase patient's metoprolol at this time as she was successfully cardioverted. Dr. Cesia Ambriz said he already spoke with Dr. Nagel Courser and patient will follow-up with him on Tuesday. PROCEDURES:  Unless otherwise noted below, none     Procedures        FINAL IMPRESSION      1. Typical atrial flutter ([address] Controlled         DISPOSITION/PLAN   DISPOSITION Decision To Discharge 12/31/2022 01:19:28 PM      PATIENT REFERRED TO:  No follow-up provider specified. DISCHARGE MEDICATIONS:  New Prescriptions    No medications on file     Controlled Substances Monitoring:     No flowsheet data found.     (Please note that portions of this note were completed with a voice recognition program.  Efforts were made to edit the dictations but occasionally words are mis-transcribed.)    Naila Matos PA-C (electronically signed)  Attending Emergency Physician           Naila Matos PA-C  12/31/22 [id number]

## 2023-01-03 ENCOUNTER — OFFICE VISIT (OUTPATIENT)
Dept: CARDIOLOGY | Age: 83
End: 2023-01-03
Payer: MEDICARE

## 2023-01-03 VITALS
DIASTOLIC BLOOD PRESSURE: 53 MMHG | SYSTOLIC BLOOD PRESSURE: 91 MMHG | HEIGHT: 64 IN | RESPIRATION RATE: 18 BRPM | HEART RATE: 54 BPM | BODY MASS INDEX: 23.42 KG/M2 | WEIGHT: 137.2 LBS | OXYGEN SATURATION: 94 %

## 2023-01-03 DIAGNOSIS — I34.0 SEVERE MITRAL REGURGITATION: Primary | ICD-10-CM

## 2023-01-03 DIAGNOSIS — I48.0 PAF (PAROXYSMAL ATRIAL FIBRILLATION) (HCC): ICD-10-CM

## 2023-01-03 DIAGNOSIS — I50.32 CHRONIC DIASTOLIC HEART FAILURE (HCC): ICD-10-CM

## 2023-01-03 DIAGNOSIS — Z95.3 STATUS POST MITRAL VALVE REPLACEMENT WITH BIOPROSTHETIC VALVE: ICD-10-CM

## 2023-01-03 DIAGNOSIS — Z79.01 ENCOUNTER FOR CURRENT LONG-TERM USE OF ANTICOAGULANTS: ICD-10-CM

## 2023-01-03 PROCEDURE — 99214 OFFICE O/P EST MOD 30 MIN: CPT | Performed by: FAMILY MEDICINE

## 2023-01-03 PROCEDURE — 93000 ELECTROCARDIOGRAM COMPLETE: CPT | Performed by: FAMILY MEDICINE

## 2023-01-03 PROCEDURE — 1123F ACP DISCUSS/DSCN MKR DOCD: CPT | Performed by: FAMILY MEDICINE

## 2023-01-03 NOTE — PROGRESS NOTES
Bev Centeno am scribing for and in the presence of Netta Leyden MD, MS, F.A.C.C..    Patient: Gibran Wells  : 1940  Date of Visit: January 3, 2023    REASON FOR VISIT / CONSULTATION: Follow-up (HX:severe mitral regurg, CHF PT is here for ER follow up had cardioversion done 22 she states she is feeling better. She states her HR was high for longer periods of time. SOB easier,palp . Denies:CP, lightheaded/dizziness,)    Dear Abagail Mortimer, MD,    I had the pleasure of seeing Gibran Wells today. Ms. Jaclyn Grant is a 80 y.o. female with a history of paroxysmal atrial fibrillation. She reports that this 1st occurred a few year ago and most recently occurred first part of February  but she says she has always had some degree of palpitations. Her last previous sustained event was about a year ago. She also has a history of mitral valve prolapse but denies any heart attacks, other cardiac issues or stroke or mini stroke. She underwent a cardiovascular stress test on 2018 that had show to be consistent with low to intermediate risk of coronary artery disease. Her cardiac catheterization on 2018 had shown mild irregularities with the worst being in her LAD at 20-30%. She was also admitted to the hospital for tikosyn loading on 2018 and has been maintained on this ever since with good results. She had an Echo done on 2020 that showed the left atrium that is severely dilated (>40) with a left atrial volume index of 76 ml/m2. Bileaflet prolapse with more prominent P2 prolapse and moderate to severe mitral regurgitation. Moderate tricuspid regurgitation. Mild pulmonary hypertension with estimated pulmonary artery systolic pressure of 33 mmHg. Moderate diastolic dysfunction. Her EF was 60%. Mitral valve replacement done at [facility] using 29 mm Epic Bioprosthetic valve on 10/06/2020. MAZE procedure done on 10/06/2020 as well as left atrial clipping.  Repeat echocardiogram on 11/4/2021 showed and ejection fraction of 60% with no significant mitral regurgitation with moderate diastolic dysfunction. Cardioversion done in ER 12/31/2022. Ms. Mary Blackburn is here for recent ER visit for A-Fib/flutter, she states they did cardioversion done while there. She states her symptoms started a couple days prior and lasted longer than she is used too, she also had chest pressure. She does have some shortness of breath with mild exertion. She denied any chest pain or discomfort, lightheadedness/dizziness,  bleeding problems, problems with her medications or any other concerns at this time       Past Medical History:   Diagnosis Date    Cancer ([facility] [address]     COVID-19 virus infection /   Jon Oxbow vaccine 2022    Essential hypertension     Fracture of sacrum ([facility] [address] 2011    GERD (gastroesophageal reflux disease)     H/O cardiovascular stress test 02/28/2018    Equivocal myocardial perfusion study. There is a small/moderate perufsion defect of mild intensity in the apical an anteroapical regions during stress imaging which is most consistent with artifact but may be due to a small degree of coronary ischemia. Overall, these results are most consistent with a low/intermediate risk for CAD. H/O echocardiogram 02/02/2018    EF 60%. Mildly increased left ventricular wall thickness. No definite specific wall motion abnormalities. Bi-atrial enlargment. Myxomatous thickening of the mitral leaflets with bileaflet prolapse and moderat mitral regurg. Moderate tricuspid regurg. Mild pulmonary HTN with an estimated right ventricualr systolic pressure [id number]. History of Holter monitoring 2016    PACs & PVCs    Irritable bowel syndrome     Mitral valve prolapse     Osteoarthritis     Osteopenia     Paroxysmal atrial fibrillation ([facility] [address] 2018    Rosacea     Tendinitis of left shoulder        CURRENT ALLERGIES: Butalbital-aspirin-caffeine and Other REVIEW OF SYSTEMS: 14 systems were reviewed.  Pertinent positives and negatives as above, all else negative.      Past Surgical History:   Procedure Laterality Date    BUNIONECTOMY      CARDIAC CATHETERIZATION Left 07/02/2020    DR Gross/[facility]/right radial-    COLONOSCOPY  2008    CYSTOCELE REPAIR      ENDOSCOPY, COLON, DIAGNOSTIC      EYE SURGERY      HERNIA REPAIR      HYSTERECTOMY (CERVIX STATUS UNKNOWN)      MOHS SURGERY  2008    SKIN BIOPSY      LUBA AND BSO (CERVIX REMOVED) Bilateral 2014    TRANSESOPHAGEAL ECHOCARDIOGRAM  07/02/2020    Dr Saúl Telles/Unsuccessful attempt    TRANSESOPHAGEAL ECHOCARDIOGRAM  07/09/2020    Dr Navarro Telles/Failed, anesthesia sedate    UPPER GASTROINTESTINAL ENDOSCOPY N/A 12/10/2019    -antral erythema,bx(mild chronic inactive gastritis with focal intestinal metaplasia,neg H-Pylori)dilation    UPPER GASTROINTESTINAL ENDOSCOPY  12/10/2019    EGD DILATION SAVORY performed by Param Bryant MD at [address] [place] [address]  2003    Sling    VASCULAR SURGERY      VEIN SURGERY      Social History:  Social History     Tobacco Use    Smoking status: Never    Smokeless tobacco: Never   Vaping Use    Vaping Use: Never used   Substance Use Topics    Alcohol use: Never     Comment: occasionally    Drug use: No        CURRENT MEDICATIONS:  Outpatient Medications Marked as Taking for the 1/3/23 encounter (Office Visit) with Barbara Hercules MD   Medication Sig Dispense Refill    omeprazole (PRILOSEC) 20 MG delayed release capsule Take 1 capsule by mouth every morning (before breakfast) 90 capsule 3    metoprolol succinate (TOPROL XL) 25 MG extended release tablet Take 0.5 tablets by mouth daily 45 tablet 3    cycloSPORINE (RESTASIS MULTIDOSE) 0.05 % ophthalmic emulsion Place 1 drop into both eyes 2 times daily      carboxymethylcellulose 1 % ophthalmic solution Place 1 drop into both eyes 3 times daily as needed for Dry Eyes      warfarin (COUMADIN) 3 MG tablet Take 1 tablet by mouth daily As directed by [facility]   Half tablet for 1.5 mg on Sundays and whole 3 mg tablet all other days. (Patient taking differently: Take 3 mg by mouth See Admin Instructions [facility] - 3 mg on Friday and 4.5 mg tablet all other days.) 90 tablet 3    acetaminophen (TYLENOL) 500 MG tablet Take 1,000 mg by mouth every 6 hours as needed for Pain      polyethylene glycol (GLYCOLAX) 17 g packet Take 17 g by mouth daily as needed for Constipation Pt taking 1/2 capful daily      aspirin EC 81 MG EC tablet Take 1 tablet by mouth daily 90 tablet 3    vitamin D (CHOLECALCIFEROL) 1000 UNIT TABS tablet Take 1,000 Units by mouth daily       docusate sodium (COLACE) 100 MG capsule Take 100 mg by mouth daily       Magnesium 400 MG TABS Take 1 tablet by mouth daily        FAMILY HISTORY: family history includes Arthritis in her sister and sister; Atrial Fibrillation in her sister; Diabetes in her brother and mother; Heart Attack in her brother; Heart Disease in her mother; High Blood Pressure in her father. PHYSICAL EXAM:   BP (!) 91/53 (Site: Left Upper Arm, Position: Sitting, Cuff Size: Medium Adult)   Pulse 54   Resp 18   Ht 5' 4\" (1.626 m)   Wt 137 lb 3.2 oz (62.2 kg)   SpO2 94%   BMI 23.55 kg/m²  Body mass index is 23.55 kg/m². Constitutional: She is oriented to person, place, and time. She appears well-developed and well-nourished. In no acute distress. HEENT: Normocephalic and atraumatic. No JVD present. Carotid bruit is not present. No mass and no thyromegaly present. No lymphadenopathy present. Cardiovascular: Normal rate, regular rhythm, normal heart sounds. Exam reveals no gallop and no friction rubs. 2/6 systolic murmur, 5th intercostal space on the LEFT in the mid-clavicular line (cardiac apex). Pulmonary/Chest: Effort normal and breath sounds normal. No respiratory distress. She has no wheezes, rhonchi or rales. Abdominal: Soft, non-tender.  Bowel sounds and aorta are normal. She exhibits no organomegaly, mass or bruit. Extremities:  Trace lower extremity edema. No cyanosis and no clubbing. Pulses are 2+ radial and carotid pulses. 2+ dorsalis pedis and posterior tibial pulses bilaterally. Compression stockings in place. Neurological: She is alert and oriented to person. No evidence of gross cranial nerve deficit. Coordination appeared normal.   Skin: Skin is warm and dry. There is no rash or diaphoresis. Psychiatric: She has a normal mood and affect. Her speech is normal and behavior is normal.      MOST RECENT LABS ON RECORD:   Lab Results   Component Value Date    WBC 4.2 12/31/2022    HGB 12.8 12/31/2022    HCT 37.5 12/31/2022     12/31/2022    CHOL 180 05/11/2019    TRIG 39 05/11/2019    HDL 67 05/11/2019    ALT 16 12/31/2022    AST 25 12/31/2022     12/31/2022    K 4.1 12/31/2022     12/31/2022    CREATININE 0.64 12/31/2022    BUN 13 12/31/2022    CO2 27 12/31/2022    TSH 2.46 04/24/2018    INR 2.4 12/06/2022    LABA1C 5.7 12/07/2018     ASSESSMENT:  1. Severe mitral regurgitation    2. Status post mitral valve replacement with bioprosthetic valve    3. PAF (paroxysmal atrial fibrillation) ([address]    4. Chronic diastolic heart failure ([address]    5. Encounter for current long-term use of anticoagulants      PLAN:  History of Severe Mitral Regurgitation: s/p mitral valve replacement done on 10/6/2020, with 29 mm Epic valve (Bioprosthetic). Currently appears well controlled. Severe Tricuspid Regurgitation seen on her last echocardiogram on 11/4/2021  Beta Blocker Therapy: Continue Metoprolol succinate (Toprol XL)  12.5 mg  daily. Counseling: I did explain to her if she ever does need to under go a colonscopy, dental cleaning, etc. That she will require to be on an antibiotic for this prior to the procedure. Because of this history, I ordered a echocardiogram to better assess the severity of this problem.      Intermittent Palpitations-Symptomatic   Beta Blocker: Continue Metoprolol succinate (Toprol XL) 25 mg 1/2 tab daily. Paroxysmal Atrial Fibrillation: Rhythm Control  S/P MAZE procedure on 10/6/2020. Beta Blocker: Continue Metoprolol succinate (Toprol XL) 25 mg 1/2 tab daily. Rhythm Control Agent: Not indicated  Stroke Risk: Her CHADS2-VASc score is 3/9 (3.2% stroke risk)   [id number] Score for Atrial Fibrillation Stroke Risk   Risk   Factors  Component Value   C CHF No 0   H HTN No 0   A2 Age >= 76 Yes,  (80 y.o.) 2   D DM No 0   S2 Prior Stroke/TIA No 0   V Vascular Disease No 0   A Age 74-69 No,  (80 y.o.) 0   Sc Sex female 1    [id number]  Score  3   Score last updated 6/76/40 6:07 AM EDT  Click here for a link to the UpToDate guideline \"Atrial Fibrillation: Anticoagulation therapy to prevent embolization  Disclaimer: Risk Score calculation is dependent on accuracy of patient problem list and past encounter diagnosis. Anticoagulation: Continue Warfarin. Goal INR 2-3. I also reminded her to watch for signs of blood in her stool or black tarry stools and stop the medication immediately if this develops as this could be life threatening. Additional Testing: Because of her current problems therefore I ordered a Lexiscan stress test with SPECT imaging to try and rule out this possibility. Chronic diastolic heart failure: New York Heart Association Class: IIa (Mild symptoms only in normal activities)  Beta Blocker: Continue Metoprolol succinate (Toprol XL) 25 mg 1/2 tab daily. ACE Inibitor/ARB: Not indicated at this time. Diuretics: not indicated at this time. Heart failure counseling: I told her to continue wearing lower extremity compression stockings and I advised them to try and keep their legs up whenever possible and to limit salt in their diet. Finally, I recommended that she continue her other medications and follow up with you as previously scheduled. FOLLOW UP:   I told Ms. Springer to call my office if she had any problems, but otherwise I asked her to Return in about 6 months (around 7/3/2023). However, I would be happy to see her sooner should the need arise. Sincerely,  Rick Ferreira. Renato MARTIN, MS, F.A.C.C. [facility] Cardiology Specialist    [address], [address] [place] [address]  Phone: [phone], Fax: [phone]     I believe that the risk of significant morbidity and mortality related to the patient's current medical conditions are: Intermediate. The documentation recorded by the scribe, accurately and completely reflects the services I personally performed and the decisions made by me. Beatriz Cornell MD, MS, F.A.C.C.  January 3, 2023

## 2023-01-03 NOTE — PATIENT INSTRUCTIONS
SURVEY:    You may be receiving a survey from "[employer]" regarding your visit today. Please complete the survey to enable us to provide the highest quality of care to you and your family. If you cannot score us a very good on any question, please call the office to discuss how we could have made your experience a very good one. Thank you.

## 2023-01-17 ENCOUNTER — HOSPITAL ENCOUNTER (OUTPATIENT)
Dept: PHARMACY | Age: 83
Setting detail: THERAPIES SERIES
Discharge: HOME | End: 2023-01-17
Payer: MEDICARE

## 2023-01-17 VITALS — BODY MASS INDEX: 23.52 KG/M2 | WEIGHT: 137 LBS

## 2023-01-17 DIAGNOSIS — Z79.01 ENCOUNTER FOR CURRENT LONG-TERM USE OF ANTICOAGULANTS: Primary | ICD-10-CM

## 2023-01-17 DIAGNOSIS — I48.0 PAROXYSMAL ATRIAL FIBRILLATION (HCC): ICD-10-CM

## 2023-01-17 LAB — INR BLD: 2.2

## 2023-01-17 PROCEDURE — 85610 PROTHROMBIN TIME: CPT

## 2023-01-17 PROCEDURE — 99211 OFF/OP EST MAY X REQ PHY/QHP: CPT

## 2023-01-17 NOTE — PROGRESS NOTES
[facility]  [facility]ication Management  ANTICOAGULATION    Referring Provider: Dr. Garcia    GOAL INR: 2-3    TODAY'S INR: 2.2    WARFARIN Dosage: Continue warfarin 3 mg po every Friday; 4.5 mg po all other days    INR (no units)   Date Value   2023 2.2   2022 2.4   10/25/2022 2.2   2022 2.4   2022 2.4   06/15/2022 2.2   2022 2.1       Medication changes:  No changes  Notes:    Fingerstick INR drawn per clinic protocol. Patient states no visible blood in urine and no black tarry stool. Denies any missed doses of warfarin. No change in other maintenance medications or in diet. Will recheck INR in 6 weeks. Patient acknowledges working in consult agreement with pharmacist as referred by his/her physician.            Patient was at [facility] ED for atrial flutter.   Patient had cardioversion in ED on 22. Patient is scheduled for stress test on 23.     For Pharmacy Admin Tracking Only    Intervention Detail: Adherence Monitorin  Total # of Interventions Recommended: 1  Total # of Interventions Accepted: 1  Time Spent (min): 20       Anabela Edwards [facility]

## 2023-01-19 ENCOUNTER — HOSPITAL ENCOUNTER (OUTPATIENT)
Dept: NON INVASIVE DIAGNOSTICS | Age: 83
Discharge: HOME | End: 2023-01-19
Payer: MEDICARE

## 2023-01-19 ENCOUNTER — TELEPHONE (OUTPATIENT)
Dept: CARDIOLOGY | Age: 83
End: 2023-01-19

## 2023-01-19 ENCOUNTER — HOSPITAL ENCOUNTER (OUTPATIENT)
Dept: NON INVASIVE DIAGNOSTICS | Age: 83
Discharge: HOME | End: 2023-01-19

## 2023-01-19 DIAGNOSIS — I48.0 PAF (PAROXYSMAL ATRIAL FIBRILLATION) (HCC): ICD-10-CM

## 2023-01-19 DIAGNOSIS — I50.32 CHRONIC DIASTOLIC HEART FAILURE (HCC): ICD-10-CM

## 2023-01-19 DIAGNOSIS — I34.0 SEVERE MITRAL REGURGITATION: ICD-10-CM

## 2023-01-19 LAB
LV EF: 55 %
LVEF MODALITY: NORMAL

## 2023-01-19 PROCEDURE — 93017 CV STRESS TEST TRACING ONLY: CPT

## 2023-01-19 PROCEDURE — 93306 TTE W/DOPPLER COMPLETE: CPT

## 2023-01-19 PROCEDURE — 3430000000 HC RX DIAGNOSTIC RADIOPHARMACEUTICAL: Performed by: FAMILY MEDICINE

## 2023-01-19 PROCEDURE — A9500 TC99M SESTAMIBI: HCPCS | Performed by: FAMILY MEDICINE

## 2023-01-19 RX ORDER — TECHNETIUM TC-99M SESTAMIBI 1 MG/10ML
30 INJECTION INTRAVENOUS
Status: COMPLETED | OUTPATIENT
Start: 2023-01-19 | End: 2023-01-19

## 2023-01-19 RX ADMIN — Medication 30 MILLICURIE: at 10:39

## 2023-01-19 NOTE — TELEPHONE ENCOUNTER
----- Message from Ramu Ray MD sent at 1/19/2023  2:08 PM EST -----  Let Ms. Springer know their test result was ok. Will discuss at next visit. Thanks.

## 2023-01-20 ENCOUNTER — HOSPITAL ENCOUNTER (OUTPATIENT)
Dept: NON INVASIVE DIAGNOSTICS | Age: 83
Discharge: HOME | End: 2023-01-20
Payer: MEDICARE

## 2023-01-20 PROCEDURE — A9500 TC99M SESTAMIBI: HCPCS | Performed by: FAMILY MEDICINE

## 2023-01-20 PROCEDURE — 78452 HT MUSCLE IMAGE SPECT MULT: CPT

## 2023-01-20 PROCEDURE — 3430000000 HC RX DIAGNOSTIC RADIOPHARMACEUTICAL: Performed by: FAMILY MEDICINE

## 2023-01-20 RX ORDER — TECHNETIUM TC-99M SESTAMIBI 1 MG/10ML
30 INJECTION INTRAVENOUS
Status: COMPLETED | OUTPATIENT
Start: 2023-01-20 | End: 2023-01-20

## 2023-01-20 RX ADMIN — Medication 30 MILLICURIE: at 14:13

## 2023-01-23 ENCOUNTER — TELEPHONE (OUTPATIENT)
Dept: CARDIOLOGY | Age: 83
End: 2023-01-23

## 2023-01-23 NOTE — TELEPHONE ENCOUNTER
----- Message from Miguel Ashby MD sent at 1/23/2023  2:03 PM EST -----  Let Ms. Springer know their test result was ok. Will discuss at next visit. Thanks.

## 2023-02-28 ENCOUNTER — HOSPITAL ENCOUNTER (OUTPATIENT)
Dept: PHARMACY | Age: 83
Setting detail: THERAPIES SERIES
Discharge: HOME | End: 2023-02-28
Payer: MEDICARE

## 2023-02-28 VITALS
WEIGHT: 134 LBS | HEART RATE: 49 BPM | DIASTOLIC BLOOD PRESSURE: 51 MMHG | BODY MASS INDEX: 23 KG/M2 | SYSTOLIC BLOOD PRESSURE: 104 MMHG

## 2023-02-28 DIAGNOSIS — I48.0 PAROXYSMAL ATRIAL FIBRILLATION (HCC): ICD-10-CM

## 2023-02-28 DIAGNOSIS — Z79.01 ENCOUNTER FOR CURRENT LONG-TERM USE OF ANTICOAGULANTS: Primary | ICD-10-CM

## 2023-02-28 PROBLEM — M75.41 ROTATOR CUFF IMPINGEMENT SYNDROME OF RIGHT SHOULDER: Status: RESOLVED | Noted: 2021-07-13 | Resolved: 2023-02-28

## 2023-02-28 LAB — INR BLD: 2.2

## 2023-02-28 PROCEDURE — 99211 OFF/OP EST MAY X REQ PHY/QHP: CPT

## 2023-02-28 PROCEDURE — 85610 PROTHROMBIN TIME: CPT

## 2023-02-28 NOTE — PROGRESS NOTES
[address]/[place]  Medication Management  ANTICOAGULATION    Referring Provider: Dr. Delbert Street INR: 2-3    TODAY'S INR: 2.2    WARFARIN Dosage: Continue warfarin 3 mg po every Friday; 4.5 mg po all other days    INR (no units)   Date Value   2023 2.2   2023 2.2   2022 2.4   10/25/2022 2.2   2022 2.4   2022 2.4   06/15/2022 2.2       Medication changes:  No changes  Notes:    Fingerstick INR drawn per clinic protocol. Patient states no visible blood in urine and no black tarry stool. Denies any missed doses of warfarin. No change in other maintenance medications or in diet. Will recheck INR in 6 weeks. Patient acknowledges working in consult agreement with pharmacist as referred by his/her physician. Prescription called to [address] (voicemail line) for warfarin 3 mg tablets - Take 1 tablet po every Friday; 1.5 tablets (=4.5 mg) po all other days or as instructed per clinic   Qty:  90 days   Refill:  3   Prescriber:  Dr. Winston Masters Only    Intervention Detail: Adherence Monitorin and Refill(s) Provided  Total # of Interventions Recommended: 1  Total # of Interventions Accepted: 2  Time Spent (min): [address]  Curly Carrasco [facility]

## 2023-05-14 DIAGNOSIS — I48.0 PAF (PAROXYSMAL ATRIAL FIBRILLATION) (HCC): ICD-10-CM

## 2023-05-14 DIAGNOSIS — I34.0 SEVERE MITRAL REGURGITATION: ICD-10-CM

## 2023-05-14 DIAGNOSIS — Z95.2 H/O MITRAL VALVE REPLACEMENT: ICD-10-CM

## 2023-05-14 DIAGNOSIS — I50.32 CHRONIC DIASTOLIC HEART FAILURE (HCC): ICD-10-CM

## 2023-05-15 RX ORDER — METOPROLOL SUCCINATE 25 MG/1
TABLET, EXTENDED RELEASE ORAL
Qty: 45 TABLET | Refills: 3 | Status: SHIPPED | OUTPATIENT
Start: 2023-05-15

## 2023-05-23 ENCOUNTER — HOSPITAL ENCOUNTER (OUTPATIENT)
Dept: PHARMACY | Age: 83
Setting detail: THERAPIES SERIES
Discharge: HOME | End: 2023-05-23
Payer: MEDICARE

## 2023-05-23 VITALS
WEIGHT: 134 LBS | DIASTOLIC BLOOD PRESSURE: 65 MMHG | BODY MASS INDEX: 23 KG/M2 | HEART RATE: 54 BPM | SYSTOLIC BLOOD PRESSURE: 109 MMHG

## 2023-05-23 DIAGNOSIS — Z79.01 ENCOUNTER FOR CURRENT LONG-TERM USE OF ANTICOAGULANTS: Primary | ICD-10-CM

## 2023-05-23 DIAGNOSIS — I48.0 PAROXYSMAL ATRIAL FIBRILLATION (HCC): ICD-10-CM

## 2023-05-23 LAB — INR BLD: 3

## 2023-05-23 PROCEDURE — 85610 PROTHROMBIN TIME: CPT

## 2023-05-23 PROCEDURE — 99211 OFF/OP EST MAY X REQ PHY/QHP: CPT

## 2023-05-23 NOTE — PROGRESS NOTES
[facility] [address] [place] [address]/[place]  Medication Management  ANTICOAGULATION    Referring Provider: Dr. Bolivar Perez INR: 2-3    TODAY'S INR: 3    WARFARIN Dosage: Continue warfarin 3 mg po every Friday; 4.5 mg po all other days    INR (no units)   Date Value   2023 3   2023 2.3   2023 2.2   2023 2.2   2023 2.2   2022 2.4   10/25/2022 2.2       Hemoglobin   Date Value Ref Range Status   2022 12.8 11.9 - 15.1 g/dL Final     Hematocrit   Date Value Ref Range Status   2022 37.5 36.3 - 47.1 % Final     ALT   Date Value Ref Range Status   2022 16 5 - 33 U/L Final     AST   Date Value Ref Range Status   2022 25 <32 U/L Final       Medication changes:  No changes    Notes:    Fingerstick INR drawn per clinic protocol. Patient states no visible blood in urine and no black tarry stool. Denies any missed doses of warfarin. No change in other maintenance medications or in diet. Will recheck INR in 7 weeks. Patient acknowledges working in consult agreement with pharmacist as referred by his/her physician. Patient tested positive for COVID 10 days ago. She reports she has been eating fewer vegetables since then/decreased appetite. For Pharmacy Admin Tracking Only    Intervention Detail: Adherence Monitorin  Total # of Interventions Recommended: 1  Total # of Interventions Accepted: 1  Time Spent (min): [address]  [place], [facility]

## 2023-07-11 ENCOUNTER — OFFICE VISIT (OUTPATIENT)
Dept: CARDIOLOGY | Age: 83
End: 2023-07-11
Payer: MEDICARE

## 2023-07-11 ENCOUNTER — HOSPITAL ENCOUNTER (OUTPATIENT)
Dept: PHARMACY | Age: 83
Setting detail: THERAPIES SERIES
Discharge: HOME | End: 2023-07-11
Payer: MEDICARE

## 2023-07-11 VITALS
OXYGEN SATURATION: 98 % | RESPIRATION RATE: 18 BRPM | DIASTOLIC BLOOD PRESSURE: 56 MMHG | HEART RATE: 56 BPM | HEIGHT: 64 IN | BODY MASS INDEX: 22.81 KG/M2 | WEIGHT: 133.6 LBS | SYSTOLIC BLOOD PRESSURE: 92 MMHG

## 2023-07-11 VITALS — BODY MASS INDEX: 22.49 KG/M2 | WEIGHT: 131 LBS

## 2023-07-11 DIAGNOSIS — Z79.01 ENCOUNTER FOR CURRENT LONG-TERM USE OF ANTICOAGULANTS: Primary | ICD-10-CM

## 2023-07-11 DIAGNOSIS — I48.0 PAROXYSMAL ATRIAL FIBRILLATION (HCC): ICD-10-CM

## 2023-07-11 DIAGNOSIS — R00.2 PALPITATIONS: ICD-10-CM

## 2023-07-11 DIAGNOSIS — I50.32 CHRONIC DIASTOLIC CONGESTIVE HEART FAILURE (HCC): ICD-10-CM

## 2023-07-11 DIAGNOSIS — Z79.01 ENCOUNTER FOR CURRENT LONG-TERM USE OF ANTICOAGULANTS: ICD-10-CM

## 2023-07-11 DIAGNOSIS — I34.0 SEVERE MITRAL REGURGITATION: Primary | ICD-10-CM

## 2023-07-11 DIAGNOSIS — I48.0 PAF (PAROXYSMAL ATRIAL FIBRILLATION) (HCC): ICD-10-CM

## 2023-07-11 DIAGNOSIS — Z79.01 CHRONIC ANTICOAGULATION: ICD-10-CM

## 2023-07-11 DIAGNOSIS — I34.0 SEVERE MITRAL REGURGITATION BY PRIOR ECHOCARDIOGRAM: ICD-10-CM

## 2023-07-11 DIAGNOSIS — Z95.2 S/P MITRAL VALVE REPLACEMENT: ICD-10-CM

## 2023-07-11 LAB — INR BLD: 2.5

## 2023-07-11 PROCEDURE — 99214 OFFICE O/P EST MOD 30 MIN: CPT | Performed by: FAMILY MEDICINE

## 2023-07-11 PROCEDURE — 85610 PROTHROMBIN TIME: CPT

## 2023-07-11 PROCEDURE — 99211 OFF/OP EST MAY X REQ PHY/QHP: CPT

## 2023-07-11 PROCEDURE — 1123F ACP DISCUSS/DSCN MKR DOCD: CPT | Performed by: FAMILY MEDICINE

## 2023-07-11 NOTE — PROGRESS NOTES
[facility]  Medication Management  ANTICOAGULATION    Referring Provider: Dr. Reyes Pipe INR: 2-3    TODAY'S INR: 2.5    WARFARIN Dosage: Continue warfarin 3 mg po every Friday; 4.5 mg po all other days    INR (no units)   Date Value   2023 2.5   2023 3   2023 2.3   2023 2.2   2023 2.2   2023 2.2   2022 2.4       Hemoglobin   Date Value Ref Range Status   2022 12.8 11.9 - 15.1 g/dL Final     Hematocrit   Date Value Ref Range Status   2022 37.5 36.3 - 47.1 % Final     ALT   Date Value Ref Range Status   2022 16 5 - 33 U/L Final     AST   Date Value Ref Range Status   2022 25 <32 U/L Final       Medication changes:  No changes    Notes:    Fingerstick INR drawn per clinic protocol. Patient states no visible blood in urine and no black tarry stool. Denies any missed doses of warfarin. No change in other maintenance medications or in diet. Will recheck INR in 6 weeks. Patient acknowledges working in consult agreement with pharmacist as referred by his/her physician. For Pharmacy Admin Tracking Only    Intervention Detail: Adherence Monitorin  Total # of Interventions Recommended: 1  Total # of Interventions Accepted: 1  Time Spent (min): Janelle Benavides, [facility]

## 2023-07-11 NOTE — PATIENT INSTRUCTIONS
SURVEY:    You may be receiving a survey from [employer] regarding your visit today. Please complete the survey to enable us to provide the highest quality of care to you and your family. If you cannot score us a very good on any question, please call the office to discuss how we could have made your experience a very good one. Thank you.

## 2023-07-11 NOTE — PROGRESS NOTES
Galilea Hardin am scribing for and in the presence of Guille Farley MD, MS, F.A.C.C..    Patient: Joni Can  : 1940  Date of Visit: 2023    REASON FOR VISIT / CONSULTATION: Atrial Fibrillation (HX: Severe Mitral Regurg, CHF, PAF,Chronic Anticoag. Echo & Stress completed. [facility] follow up. //She has been doing good. Little swelling still in legs. SOB still at times, not worsening. Lightheaded/balance is off - no falls or near falls. Palpitations. Occasional pressure, very minor. )      Dear Snehal Lamb MD,    I had the pleasure of seeing Joni Can today. Ms. Gunnar Pretty is a 80 y.o. female with a history of paroxysmal atrial fibrillation. She reports that this 1st occurred a few year ago and most recently occurred first part of February  but she says she has always had some degree of palpitations. Her last previous sustained event was about a year ago. She also has a history of mitral valve prolapse but denies any heart attacks, other cardiac issues or stroke or mini stroke. She underwent a cardiovascular stress test on 2018 that had show to be consistent with low to intermediate risk of coronary artery disease. Her cardiac catheterization on 2018 had shown mild irregularities with the worst being in her LAD at 20-30%. She was also admitted to the hospital for tikosyn loading on 2018 and has been maintained on this ever since with good results. She had an Echo done on 2020 that showed the left atrium that is severely dilated (>40) with a left atrial volume index of 76 ml/m2. Bileaflet prolapse with more prominent P2 prolapse and moderate to severe mitral regurgitation. Moderate tricuspid regurgitation. Mild pulmonary hypertension with estimated pulmonary artery systolic pressure of 33 mmHg. Moderate diastolic dysfunction. Her EF was 60%. Mitral valve replacement done at [facility] using 29 mm Epic Bioprosthetic valve on 10/06/2020.  MAZE procedure

## 2023-08-16 PROBLEM — M62.838 NECK MUSCLE SPASM: Status: ACTIVE | Noted: 2023-08-16

## 2023-08-22 ENCOUNTER — HOSPITAL ENCOUNTER (OUTPATIENT)
Dept: PHARMACY | Age: 83
Setting detail: THERAPIES SERIES
Discharge: HOME | End: 2023-08-22
Payer: MEDICARE

## 2023-08-22 VITALS
DIASTOLIC BLOOD PRESSURE: 61 MMHG | BODY MASS INDEX: 22.31 KG/M2 | HEART RATE: 60 BPM | SYSTOLIC BLOOD PRESSURE: 101 MMHG | WEIGHT: 130 LBS

## 2023-08-22 DIAGNOSIS — I48.0 PAROXYSMAL ATRIAL FIBRILLATION (HCC): ICD-10-CM

## 2023-08-22 DIAGNOSIS — Z79.01 ENCOUNTER FOR CURRENT LONG-TERM USE OF ANTICOAGULANTS: Primary | ICD-10-CM

## 2023-08-22 LAB — INR BLD: 2.6

## 2023-08-22 PROCEDURE — 85610 PROTHROMBIN TIME: CPT | Performed by: FAMILY MEDICINE

## 2023-08-22 PROCEDURE — 99211 OFF/OP EST MAY X REQ PHY/QHP: CPT | Performed by: FAMILY MEDICINE

## 2023-08-22 NOTE — PATIENT INSTRUCTIONS
Continue to take warfarin 3mg (1 tablet) Friday and 4.5mg (1 1/2 tablets) all other days. Continue to monitor urine and stool for signs and symptoms of bleeding. Please notify the clinic of any medication changes. Please remember to bring all medications (both prescription and OTC) to your next visit. Kindly notify the clinic if you are unable to make to your next appointment. Continue current dose of warfarin as instructed on dosing calendar provided.

## 2023-08-22 NOTE — PROGRESS NOTES
[facility]  Medication Management  ANTICOAGULATION    Referring Provider: Dr Argelia Bosch INR: 2.0-3.0    TODAY'S INR: 2.6    WARFARIN Dosage: continue 3mg Friday, 4.5mg all other days    INR (no units)   Date Value   2023 2.6   2023 2.5   2023 3   2023 2.3   2023 2.2   2023 2.2   2023 2.2       Hemoglobin   Date Value Ref Range Status   2022 12.8 11.9 - 15.1 g/dL Final     Hematocrit   Date Value Ref Range Status   2022 37.5 36.3 - 47.1 % Final     ALT   Date Value Ref Range Status   2022 16 5 - 33 U/L Final     AST   Date Value Ref Range Status   2022 25 <32 U/L Final       Medication changes:  No changes    Notes:    Fingerstick INR drawn per clinic protocol. Patient states no visible blood in urine and no black tarry stool. Denies any missed doses of warfarin. No change in other maintenance medications or in diet. Will recheck INR in 6 weeks. Patient acknowledges working in consult agreement with pharmacist as referred by his/her physician.                   For Pharmacy Admin Tracking Only    Intervention Detail: Adherence Monitorin  Total # of Interventions Recommended: 2  Total # of Interventions Accepted: 2  Time Spent (min): 20      Naila Henriquez R.Ph., 2023,1:54 PM

## 2023-08-29 PROBLEM — D68.69 SECONDARY HYPERCOAGULABLE STATE (HCC): Status: ACTIVE | Noted: 2023-08-29

## 2023-10-05 ENCOUNTER — HOSPITAL ENCOUNTER (OUTPATIENT)
Dept: PHARMACY | Age: 83
Setting detail: THERAPIES SERIES
Discharge: HOME | End: 2023-10-05
Payer: MEDICARE

## 2023-10-05 VITALS — WEIGHT: 131 LBS | BODY MASS INDEX: 22.49 KG/M2

## 2023-10-05 DIAGNOSIS — Z79.01 ENCOUNTER FOR CURRENT LONG-TERM USE OF ANTICOAGULANTS: Primary | ICD-10-CM

## 2023-10-05 DIAGNOSIS — I48.0 PAROXYSMAL ATRIAL FIBRILLATION (HCC): ICD-10-CM

## 2023-10-05 LAB — INR BLD: 3.2

## 2023-10-05 PROCEDURE — 85610 PROTHROMBIN TIME: CPT

## 2023-10-05 PROCEDURE — 99212 OFFICE O/P EST SF 10 MIN: CPT

## 2023-10-05 NOTE — PROGRESS NOTES
[address] [facility]  Medication Management  ANTICOAGULATION    Referring Provider: Dr Argelia Bosch INR: 2 - 3    TODAY'S INR: 3.2    WARFARIN Dosage: Hold warfarin today then continue warfarin 3 mg tablet  and 1.5 tablets for 4.5 mg all other days. INR (no units)   Date Value   2023 2.6   2023 2.5   2023 3   2023 2.3   2023 2.2   2023 2.2   2023 2.2       Hemoglobin   Date Value Ref Range Status   2022 12.8 11.9 - 15.1 g/dL Final     Hematocrit   Date Value Ref Range Status   2022 37.5 36.3 - 47.1 % Final     ALT   Date Value Ref Range Status   2022 16 5 - 33 U/L Final     AST   Date Value Ref Range Status   2022 25 <32 U/L Final       Medication changes:  none    Notes:    Fingerstick INR drawn per clinic protocol. Patient states no visible blood in urine and no black tarry stool. Denies any missed doses of warfarin. No change in other maintenance medications or in diet. Will recheck INR in 4 weeks. Patient acknowledges working in consult agreement with pharmacist as referred by his/her physician.                   For Pharmacy Admin Tracking Only    Intervention Detail: Adherence Monitorin and Dose Adjustment: 1, reason: Therapy Optimization  Total # of Interventions Recommended: 1  Total # of Interventions Accepted: 1  Time Spent (min): [address] [facility], [address] [facility], PharmD

## 2023-11-03 ENCOUNTER — HOSPITAL ENCOUNTER (OUTPATIENT)
Dept: PHARMACY | Age: 83
Setting detail: THERAPIES SERIES
Discharge: HOME | End: 2023-11-03
Payer: MEDICARE

## 2023-11-03 VITALS
HEART RATE: 56 BPM | WEIGHT: 132 LBS | BODY MASS INDEX: 22.66 KG/M2 | SYSTOLIC BLOOD PRESSURE: 107 MMHG | DIASTOLIC BLOOD PRESSURE: 58 MMHG

## 2023-11-03 DIAGNOSIS — I48.0 PAROXYSMAL ATRIAL FIBRILLATION (HCC): ICD-10-CM

## 2023-11-03 DIAGNOSIS — Z79.01 ENCOUNTER FOR CURRENT LONG-TERM USE OF ANTICOAGULANTS: Primary | ICD-10-CM

## 2023-11-03 LAB — INR BLD: 2

## 2023-11-03 PROCEDURE — 85610 PROTHROMBIN TIME: CPT

## 2023-11-03 PROCEDURE — 99211 OFF/OP EST MAY X REQ PHY/QHP: CPT

## 2023-11-03 NOTE — PROGRESS NOTES
[facility]  Medication Management  ANTICOAGULATION    Referring Provider: Dr Reyes Pipe INR: 2 - 3     TODAY'S INR: 2.0     WARFARIN Dosage: Continue warfarin 3 mg tablet  and 1.5 tablets for 4.5 mg all other days. INR (no units)   Date Value   2023 2   10/05/2023 3.2   2023 2.6   2023 2.5   2023 3   2023 2.3   2023 2.2       Hemoglobin   Date Value Ref Range Status   2022 12.8 11.9 - 15.1 g/dL Final     Hematocrit   Date Value Ref Range Status   2022 37.5 36.3 - 47.1 % Final     ALT   Date Value Ref Range Status   2022 16 5 - 33 U/L Final     AST   Date Value Ref Range Status   2022 25 <32 U/L Final     Medication changes:  none    Notes:    Fingerstick INR drawn per clinic protocol. Patient states no visible blood in urine and no black tarry stool. Denies any missed doses of warfarin. No change in other maintenance medications or in diet. Will recheck INR in 5 weeks. Patient acknowledges working in consult agreement with pharmacist as referred by his/her physician.                   For Pharmacy Admin Tracking Only    Intervention Detail: Adherence Monitorin  Total # of Interventions Recommended: 0  Total # of Interventions Accepted: 0  Time Spent (min): [address] [facility], [address] [facility], PharmD

## 2023-12-05 ENCOUNTER — HOSPITAL ENCOUNTER (OUTPATIENT)
Dept: PHARMACY | Age: 83
Setting detail: THERAPIES SERIES
Discharge: HOME | End: 2023-12-05
Payer: MEDICARE

## 2023-12-05 VITALS
DIASTOLIC BLOOD PRESSURE: 57 MMHG | WEIGHT: 133 LBS | BODY MASS INDEX: 22.83 KG/M2 | HEART RATE: 49 BPM | SYSTOLIC BLOOD PRESSURE: 107 MMHG

## 2023-12-05 DIAGNOSIS — Z79.01 ENCOUNTER FOR CURRENT LONG-TERM USE OF ANTICOAGULANTS: Primary | ICD-10-CM

## 2023-12-05 DIAGNOSIS — I48.0 PAROXYSMAL ATRIAL FIBRILLATION (HCC): ICD-10-CM

## 2023-12-05 LAB — INR BLD: 2.7

## 2023-12-05 PROCEDURE — 99211 OFF/OP EST MAY X REQ PHY/QHP: CPT

## 2023-12-05 PROCEDURE — 85610 PROTHROMBIN TIME: CPT

## 2023-12-05 NOTE — PROGRESS NOTES
[facility]  Medication Management  ANTICOAGULATION    Referring Provider: Dr Bryan Linton INR: 2 - 3     TODAY'S INR: 2.7     WARFARIN Dosage: Continue warfarin 3 mg tablet  and 1.5 tablets for 4.5 mg all other days. INR (no units)   Date Value   2023 2.7   2023 2   10/05/2023 3.2   2023 2.6   2023 2.5   2023 3   2023 2.3       Hemoglobin   Date Value Ref Range Status   2022 12.8 11.9 - 15.1 g/dL Final     Hematocrit   Date Value Ref Range Status   2022 37.5 36.3 - 47.1 % Final     ALT   Date Value Ref Range Status   2022 16 5 - 33 U/L Final     AST   Date Value Ref Range Status   2022 25 <32 U/L Final     Medication changes:  No changes    Notes:    Fingerstick INR drawn per clinic protocol. Patient states no visible blood in urine and no black tarry stool. Denies any missed doses of warfarin. No change in other maintenance medications or in diet. Will recheck INR in 6 weeks. Patient acknowledges working in consult agreement with pharmacist as referred by his/her physician. Patient fell approximately 2 weeks ago. She bruised her arm/elbow and hit her head against door. For Pharmacy Admin Tracking Only    Intervention Detail: Adherence Monitorin  Total # of Interventions Recommended: 1  Total # of Interventions Accepted: 1  Time Spent (min): [address] [facility].  Giulia Head, [facility]

## 2024-01-11 ENCOUNTER — OFFICE VISIT (OUTPATIENT)
Dept: CARDIOLOGY | Age: 84
End: 2024-01-11
Payer: MEDICARE

## 2024-01-11 VITALS
RESPIRATION RATE: 18 BRPM | OXYGEN SATURATION: 99 % | HEIGHT: 60 IN | HEART RATE: 55 BPM | DIASTOLIC BLOOD PRESSURE: 57 MMHG | BODY MASS INDEX: 26.11 KG/M2 | SYSTOLIC BLOOD PRESSURE: 93 MMHG | WEIGHT: 133 LBS

## 2024-01-11 DIAGNOSIS — Z79.01 CHRONIC ANTICOAGULATION: ICD-10-CM

## 2024-01-11 DIAGNOSIS — I48.0 PAF (PAROXYSMAL ATRIAL FIBRILLATION) (HCC): ICD-10-CM

## 2024-01-11 DIAGNOSIS — Z95.2 S/P MITRAL VALVE REPLACEMENT: ICD-10-CM

## 2024-01-11 DIAGNOSIS — I50.32 CHRONIC DIASTOLIC CONGESTIVE HEART FAILURE (HCC): ICD-10-CM

## 2024-01-11 DIAGNOSIS — I34.0 SEVERE MITRAL REGURGITATION: Primary | ICD-10-CM

## 2024-01-11 PROCEDURE — 99214 OFFICE O/P EST MOD 30 MIN: CPT | Performed by: PHYSICIAN ASSISTANT

## 2024-01-11 PROCEDURE — 1123F ACP DISCUSS/DSCN MKR DOCD: CPT | Performed by: PHYSICIAN ASSISTANT

## 2024-01-11 PROCEDURE — 93000 ELECTROCARDIOGRAM COMPLETE: CPT | Performed by: FAMILY MEDICINE

## 2024-01-11 RX ORDER — PREDNISONE 5 MG/1
5 TABLET ORAL DAILY
COMMUNITY
Start: 2024-01-10 | End: 2024-02-09

## 2024-01-11 NOTE — PROGRESS NOTES
Patient: Aysha Springer  : 1940  Date of Visit: 2024    REASON FOR VISIT / CONSULTATION: Atrial Fibrillation (HX: Severe Mitral Regurg, CHF, PAF,Chronic Anticoag. Pt is here for a 6 month follow up. Pt reports she is doing well. She has some SOB on exertion while going up stairs, not worsening. She palpitations but it does not last or give her any problems. Denies CP, light headed/dizziness. )      Dear Radha, Ren Matias MD,    I had the pleasure of seeing Aysha Springer today. Ms. Springer is a 83 y.o. female with a history of paroxysmal atrial fibrillation. She reports that this 1st occurred a few year ago and most recently occurred first part of February  but she says she has always had some degree of palpitations. Her last previous sustained event was about a year ago. She also has a history of mitral valve prolapse but denies any heart attacks, other cardiac issues or stroke or mini stroke. She underwent a cardiovascular stress test on 2018 that had show to be consistent with low to intermediate risk of coronary artery disease. Her cardiac catheterization on 2018 had shown mild irregularities with the worst being in her LAD at 20-30%. She was also admitted to the hospital for tikosyn loading on 2018 and has been maintained on this ever since with good results. She had an Echo done on 2020 that showed the left atrium that is severely dilated (>40) with a left atrial volume index of 76 ml/m2. Bileaflet prolapse with more prominent P2 prolapse and moderate to severe mitral regurgitation. Moderate tricuspid regurgitation. Mild pulmonary hypertension with estimated pulmonary artery systolic pressure of 33 mmHg. Moderate diastolic dysfunction. Her EF was 60%. Mitral valve replacement done at [facility] using 29 mm Epic Bioprosthetic valve on 10/06/2020. MAZE procedure done on 10/06/2020 as well as left atrial clipping. Repeat echocardiogram on 2021

## 2024-01-16 ENCOUNTER — HOSPITAL ENCOUNTER (OUTPATIENT)
Age: 84
Discharge: HOME | End: 2024-01-16
Payer: MEDICARE

## 2024-01-16 ENCOUNTER — HOSPITAL ENCOUNTER (OUTPATIENT)
Dept: PHARMACY | Age: 84
Setting detail: THERAPIES SERIES
Discharge: HOME | End: 2024-01-16
Payer: MEDICARE

## 2024-01-16 VITALS
WEIGHT: 131 LBS | HEART RATE: 56 BPM | SYSTOLIC BLOOD PRESSURE: 127 MMHG | DIASTOLIC BLOOD PRESSURE: 61 MMHG | BODY MASS INDEX: 25.58 KG/M2

## 2024-01-16 DIAGNOSIS — I48.0 PAF (PAROXYSMAL ATRIAL FIBRILLATION) (HCC): ICD-10-CM

## 2024-01-16 DIAGNOSIS — I34.0 SEVERE MITRAL REGURGITATION: ICD-10-CM

## 2024-01-16 DIAGNOSIS — I50.32 CHRONIC DIASTOLIC CONGESTIVE HEART FAILURE (HCC): ICD-10-CM

## 2024-01-16 DIAGNOSIS — I48.0 PAROXYSMAL ATRIAL FIBRILLATION (HCC): ICD-10-CM

## 2024-01-16 DIAGNOSIS — Z79.01 ENCOUNTER FOR CURRENT LONG-TERM USE OF ANTICOAGULANTS: Primary | ICD-10-CM

## 2024-01-16 DIAGNOSIS — Z79.01 CHRONIC ANTICOAGULATION: ICD-10-CM

## 2024-01-16 DIAGNOSIS — Z95.2 S/P MITRAL VALVE REPLACEMENT: ICD-10-CM

## 2024-01-16 LAB
ANION GAP SERPL CALCULATED.3IONS-SCNC: 8 MMOL/L (ref 9–17)
BASOPHILS # BLD: 0.05 K/UL (ref 0–0.2)
BASOPHILS NFR BLD: 1 % (ref 0–2)
BUN SERPL-MCNC: 14 MG/DL (ref 8–23)
BUN/CREAT SERPL: 20 (ref 9–20)
CALCIUM SERPL-MCNC: 9 MG/DL (ref 8.6–10.4)
CHLORIDE SERPL-SCNC: 98 MMOL/L (ref 98–107)
CHOLEST SERPL-MCNC: 178 MG/DL
CHOLESTEROL/HDL RATIO: 2.6
CO2 SERPL-SCNC: 30 MMOL/L (ref 20–31)
CREAT SERPL-MCNC: 0.7 MG/DL (ref 0.5–0.9)
EOSINOPHIL # BLD: 0.07 K/UL (ref 0–0.44)
EOSINOPHILS RELATIVE PERCENT: 1 % (ref 1–4)
ERYTHROCYTE [DISTWIDTH] IN BLOOD BY AUTOMATED COUNT: 13 % (ref 11.8–14.4)
GFR SERPL CREATININE-BSD FRML MDRD: >60 ML/MIN/1.73M2
GLUCOSE SERPL-MCNC: 94 MG/DL (ref 70–99)
HCT VFR BLD AUTO: 36.7 % (ref 36.3–47.1)
HDLC SERPL-MCNC: 69 MG/DL
HGB BLD-MCNC: 11.9 G/DL (ref 11.9–15.1)
IMM GRANULOCYTES # BLD AUTO: 0.03 K/UL (ref 0–0.3)
IMM GRANULOCYTES NFR BLD: 1 %
INR BLD: 3
LDLC SERPL CALC-MCNC: 98 MG/DL (ref 0–130)
LYMPHOCYTES NFR BLD: 0.81 K/UL (ref 1.1–3.7)
LYMPHOCYTES RELATIVE PERCENT: 15 % (ref 24–43)
MCH RBC QN AUTO: 33.8 PG (ref 25.2–33.5)
MCHC RBC AUTO-ENTMCNC: 32.4 G/DL (ref 28.4–34.8)
MCV RBC AUTO: 104.3 FL (ref 82.6–102.9)
MONOCYTES NFR BLD: 0.44 K/UL (ref 0.1–1.2)
MONOCYTES NFR BLD: 8 % (ref 3–12)
NEUTROPHILS NFR BLD: 74 % (ref 36–65)
NEUTS SEG NFR BLD: 4.09 K/UL (ref 1.5–8.1)
NRBC BLD-RTO: 0 PER 100 WBC
PLATELET # BLD AUTO: 178 K/UL (ref 138–453)
PMV BLD AUTO: 9.6 FL (ref 8.1–13.5)
POTASSIUM SERPL-SCNC: 4 MMOL/L (ref 3.7–5.3)
RBC # BLD AUTO: 3.52 M/UL (ref 3.95–5.11)
SODIUM SERPL-SCNC: 136 MMOL/L (ref 135–144)
TRIGL SERPL-MCNC: 54 MG/DL
WBC OTHER # BLD: 5.5 K/UL (ref 3.5–11.3)

## 2024-01-16 PROCEDURE — 85610 PROTHROMBIN TIME: CPT

## 2024-01-16 PROCEDURE — 80048 BASIC METABOLIC PNL TOTAL CA: CPT

## 2024-01-16 PROCEDURE — 80061 LIPID PANEL: CPT

## 2024-01-16 PROCEDURE — 99212 OFFICE O/P EST SF 10 MIN: CPT

## 2024-01-16 PROCEDURE — 36415 COLL VENOUS BLD VENIPUNCTURE: CPT

## 2024-01-16 PROCEDURE — 85025 COMPLETE CBC W/AUTO DIFF WBC: CPT

## 2024-01-16 NOTE — PROGRESS NOTES
[facility]  Medication Management  ANTICOAGULATION    Referring Provider: Dr Garcia     GOAL INR: 2 - 3     TODAY'S INR: 3     WARFARIN Dosage: 3 mg x 1, then continue warfarin 3 mg tablet  and 1.5 tablets for 4.5 mg all other days.    INR (no units)   Date Value   2024 3   2023 2.7   2023 2   10/05/2023 3.2   2023 2.6   2023 2.5   2023 3       Hemoglobin   Date Value Ref Range Status   2022 12.8 11.9 - 15.1 g/dL Final     Hematocrit   Date Value Ref Range Status   2022 37.5 36.3 - 47.1 % Final     ALT   Date Value Ref Range Status   2022 16 5 - 33 U/L Final     AST   Date Value Ref Range Status   2022 25 <32 U/L Final     Medication changes:  Prednisone taper for shoulder/knee pain - day 6 - end 24    Notes:    Fingerstick INR drawn per clinic protocol. Patient states no visible blood in urine and no black tarry stool. Denies any missed doses of warfarin. No change in other maintenance medications or in diet. Will recheck INR in 6 weeks. Patient acknowledges working in consult agreement with pharmacist as referred by his/her physician.                  For Pharmacy Admin Tracking Only    Intervention Detail: Adherence Monitorin and Dose Adjustment: 1, reason: Improve Adherence, Therapy Optimization  Total # of Interventions Recommended: 2  Total # of Interventions Accepted: 2  Time Spent (min): 20       Anabela Edwards RPH

## 2024-01-16 NOTE — PATIENT INSTRUCTIONS
Please take 3 mg today.   Continue current dose of warfarin as instructed on dosing calendar provided.   Continue to monitor urine and stool for signs and symptoms of bleeding.   Please notify the clinic of any medication changes.   Please remember to bring all medications (both prescription and OTC) to your next visit. Kindly notify the clinic if you are unable to make to your next appointment.

## 2024-01-17 ENCOUNTER — TELEPHONE (OUTPATIENT)
Dept: CARDIOLOGY | Age: 84
End: 2024-01-17

## 2024-01-17 NOTE — TELEPHONE ENCOUNTER
----- Message from Bessy Fay PA-C sent at 1/17/2024  8:02 AM EST -----  Please notify patient that their lab results are stable.  Please continue current treatment and follow up.  
Pt aware  
With history of ureteroscopy/laser/stent in past for left ureteral stone  -Patient doing well, asymptomatic, recent CT shows resolution of hydronephrosis and ureteral stones, does show stable 12 mm nonobstructing left upper pole renal stone  -Patient counseled on options of observation, USSE, ESWL, PCNL, I recommend ESWL with or without stent, patient would like to proceed without stent, understands the risks of this approach  -Surgical scheduler will call patient to schedule, patient counseled that this may take staged approach or several procedures due to size of stone  
No complaints

## 2024-01-17 NOTE — RESULT ENCOUNTER NOTE
Please notify patient that their lab results are stable.  Please continue current treatment and follow up.

## 2024-02-06 ENCOUNTER — ANTI-COAG VISIT (OUTPATIENT)
Dept: PHARMACY | Age: 84
End: 2024-02-06

## 2024-02-06 DIAGNOSIS — Z79.01 ENCOUNTER FOR CURRENT LONG-TERM USE OF ANTICOAGULANTS: Primary | ICD-10-CM

## 2024-02-06 DIAGNOSIS — I48.0 PAROXYSMAL ATRIAL FIBRILLATION (HCC): ICD-10-CM

## 2024-02-10 ENCOUNTER — HOSPITAL ENCOUNTER (EMERGENCY)
Age: 84
Discharge: HOME | End: 2024-02-11
Attending: EMERGENCY MEDICINE
Payer: MEDICARE

## 2024-02-10 VITALS
BODY MASS INDEX: 23.55 KG/M2 | TEMPERATURE: 97.7 F | OXYGEN SATURATION: 97 % | HEIGHT: 62 IN | RESPIRATION RATE: 17 BRPM | SYSTOLIC BLOOD PRESSURE: 145 MMHG | WEIGHT: 128 LBS | DIASTOLIC BLOOD PRESSURE: 66 MMHG | HEART RATE: 99 BPM

## 2024-02-10 DIAGNOSIS — R04.0 EPISTAXIS: Primary | ICD-10-CM

## 2024-02-10 PROCEDURE — 99283 EMERGENCY DEPT VISIT LOW MDM: CPT

## 2024-02-11 LAB
INR PPP: 1.6
PROTHROMBIN TIME: 19.3 SEC (ref 11.9–14.8)

## 2024-02-11 PROCEDURE — 6370000000 HC RX 637 (ALT 250 FOR IP): Performed by: EMERGENCY MEDICINE

## 2024-02-11 PROCEDURE — 85610 PROTHROMBIN TIME: CPT

## 2024-02-11 PROCEDURE — 36415 COLL VENOUS BLD VENIPUNCTURE: CPT

## 2024-02-11 RX ORDER — TRANEXAMIC ACID 100 MG/ML
INJECTION, SOLUTION INTRAVENOUS
Status: DISCONTINUED
Start: 2024-02-11 | End: 2024-02-11 | Stop reason: HOSPADM

## 2024-02-11 RX ORDER — AMOXICILLIN AND CLAVULANATE POTASSIUM 875; 125 MG/1; MG/1
1 TABLET, FILM COATED ORAL 2 TIMES DAILY
Qty: 6 TABLET | Refills: 0 | Status: SHIPPED | OUTPATIENT
Start: 2024-02-11 | End: 2024-02-14 | Stop reason: SDUPTHER

## 2024-02-11 RX ORDER — AMOXICILLIN AND CLAVULANATE POTASSIUM 875; 125 MG/1; MG/1
1 TABLET, FILM COATED ORAL ONCE
Status: COMPLETED | OUTPATIENT
Start: 2024-02-11 | End: 2024-02-11

## 2024-02-11 RX ORDER — OXYMETAZOLINE HYDROCHLORIDE 0.05 G/100ML
SPRAY NASAL
Status: DISCONTINUED
Start: 2024-02-11 | End: 2024-02-11 | Stop reason: HOSPADM

## 2024-02-11 RX ORDER — OXYMETAZOLINE HYDROCHLORIDE 0.05 G/100ML
2 SPRAY NASAL
Status: COMPLETED | OUTPATIENT
Start: 2024-02-11 | End: 2024-02-11

## 2024-02-11 RX ADMIN — OXYMETAZOLINE HYDROCHLORIDE 2 SPRAY: 0.05 SPRAY NASAL at 00:05

## 2024-02-11 RX ADMIN — AMOXICILLIN AND CLAVULANATE POTASSIUM 1 TABLET: 875; 125 TABLET, FILM COATED ORAL at 01:19

## 2024-02-11 NOTE — DISCHARGE INSTRUCTIONS
I recommend following up with an ENT physician within the next 24-48 hours. We do not have one locally; you may follow-up with one in the [facility] or [place] area, though you will need to contact them on your own.    If unable to follow-up with an ENT physician, please return to the ED or follow-up with Dr. Garcia for packing removal and re-evaluation.    Return to the ED for return of bleeding, fever, difficulty breathing or any other concerns.    Your INR is 1.6; expect to be contacted by the [facility]madin [facility] this week to discuss your warfarin dosing.

## 2024-02-11 NOTE — ED PROVIDER NOTES
[facility]  EMERGENCY DEPARTMENT ENCOUNTER      Pt Name: Aysha Springer  MRN: [id number]  Birthdate 1940  Date of evaluation: 2/10/2024  Provider: Alexander Ulloa MD    CHIEF COMPLAINT       Chief Complaint   Patient presents with    Epistaxis     Onset on hour ago, on coumadin and 81mg asa         HISTORY OF PRESENT ILLNESS      Aysha Springer is a 83 y.o. female, anticoagulated on warfarin for afib, who presents to the emergency department for evaluation of epistaxis. States that this began ~1hr prior to arrival while washing her face. No reported trauma. No other bleeding complaints.     No other complaints at this time.     PAST MEDICAL HISTORY     Past Medical History:   Diagnosis Date    Cancer ([facility])     COVID-19 virus infection /   Moderna vaccine 2022    Essential hypertension     Fracture of sacrum (HCC) 2011    GERD (gastroesophageal reflux disease)     H/O cardiovascular stress test 02/28/2018    Equivocal myocardial perfusion study. There is a small/moderate perufsion defect of mild intensity in the apical an anteroapical regions during stress imaging which is most consistent with artifact but may be due to a small degree of coronary ischemia. Overall, these results are most consistent with a low/intermediate risk for CAD.     H/O echocardiogram 02/02/2018    EF 60%. Mildly increased left ventricular wall thickness. No definite specific wall motion abnormalities. Bi-atrial enlargment. Myxomatous thickening of the mitral leaflets with bileaflet prolapse and moderat mitral regurg. Moderate tricuspid regurg. Mild pulmonary HTN with an estimated right ventricualr systolic pressure 32mmHg.     History of Holter monitoring 2016    PACs & PVCs    Irritable bowel syndrome     Mitral valve prolapse     Osteoarthritis     Osteopenia     Paroxysmal atrial fibrillation (HCC) 2018    Rosacea     Tendinitis of left shoulder          SURGICAL HISTORY       Past Surgical History:   Procedure Laterality Date

## 2024-02-12 ENCOUNTER — ANTI-COAG VISIT (OUTPATIENT)
Dept: PHARMACY | Age: 84
End: 2024-02-12

## 2024-02-12 DIAGNOSIS — Z79.01 ENCOUNTER FOR CURRENT LONG-TERM USE OF ANTICOAGULANTS: Primary | ICD-10-CM

## 2024-02-12 DIAGNOSIS — I48.0 PAROXYSMAL ATRIAL FIBRILLATION (HCC): ICD-10-CM

## 2024-02-12 PROBLEM — R04.0 EPISTAXIS: Status: ACTIVE | Noted: 2024-02-12

## 2024-02-12 NOTE — PROGRESS NOTES
Dr Ulloa contacted coumadin clinic with update on patient's ER visit.    Patient also contacted clinic this morning to provide update. Patient states epistaxis \"was pretty significant. I was afraid to take warfarin last night so I skipped warfarin and aspirin\". Patient states she was instructed by Dr Ulloa to contact her ENT to have packing removed.  Aysha will call when they open this morning to get appt.  Patient states she is having a \"bit of serous drainage tinged in pink this morning\". Patient states she also has a cold and her nose is \"runny\".  Patient will take warfarin 4.5mg today and tomorrow and have INR check on 2/14.

## 2024-02-13 ENCOUNTER — ANTI-COAG VISIT (OUTPATIENT)
Dept: PHARMACY | Age: 84
End: 2024-02-13

## 2024-02-13 DIAGNOSIS — Z79.01 ENCOUNTER FOR CURRENT LONG-TERM USE OF ANTICOAGULANTS: Primary | ICD-10-CM

## 2024-02-13 DIAGNOSIS — I48.0 PAROXYSMAL ATRIAL FIBRILLATION (HCC): ICD-10-CM

## 2024-02-13 NOTE — PROGRESS NOTES
Patient contacted clinic with notification that she has been instructed per Dr. Garcia to hold warfarin due to recent epistaxis with nasal packing.  She cancelled INR check for tomorrow, 2/14/24.  She is seeing ENT tomorrow for evaluation of history of epistaxis.  Patient will notify clinic when she resumes warfarin.  She requested to keep her appointment in clinic for 2/27/24, but will reschedule sooner if she restarts warfarin this week - after seeing ENT.

## 2024-02-27 ENCOUNTER — APPOINTMENT (OUTPATIENT)
Dept: PHARMACY | Age: 84
End: 2024-02-27
Payer: MEDICARE

## 2024-02-29 ENCOUNTER — HOSPITAL ENCOUNTER (OUTPATIENT)
Dept: PHARMACY | Age: 84
Setting detail: THERAPIES SERIES
Discharge: HOME | End: 2024-02-29
Payer: MEDICARE

## 2024-02-29 VITALS
WEIGHT: 132 LBS | DIASTOLIC BLOOD PRESSURE: 60 MMHG | BODY MASS INDEX: 24.14 KG/M2 | HEART RATE: 52 BPM | SYSTOLIC BLOOD PRESSURE: 120 MMHG

## 2024-02-29 DIAGNOSIS — I48.0 PAROXYSMAL ATRIAL FIBRILLATION (HCC): ICD-10-CM

## 2024-02-29 DIAGNOSIS — Z79.01 ENCOUNTER FOR CURRENT LONG-TERM USE OF ANTICOAGULANTS: Primary | ICD-10-CM

## 2024-02-29 LAB — INR BLD: 2.2

## 2024-02-29 PROCEDURE — 99211 OFF/OP EST MAY X REQ PHY/QHP: CPT

## 2024-02-29 PROCEDURE — 85610 PROTHROMBIN TIME: CPT

## 2024-02-29 RX ORDER — LANOLIN AND PETROLATUM 136.4; 469.9 MG/G; MG/G
OINTMENT TOPICAL PRN
COMMUNITY

## 2024-02-29 NOTE — PROGRESS NOTES
[facility]  Medication Management  ANTICOAGULATION    Referring Provider: Dr Garcia    GOAL INR: 2 - 3    TODAY'S INR: 2.2    WARFARIN Dosage: Continue warfarin 3 mg tablet  and 1.5 tablets for 4.5 mg all other days.    INR (no units)   Date Value   2024 1.6   2024 3   2023 2.7   2023 2   10/05/2023 3.2   2023 2.6   2023 2.5       Hemoglobin   Date Value Ref Range Status   2024 11.9 11.9 - 15.1 g/dL Final     Hematocrit   Date Value Ref Range Status   2024 36.7 36.3 - 47.1 % Final     ALT   Date Value Ref Range Status   2022 16 5 - 33 U/L Final     AST   Date Value Ref Range Status   2022 25 <32 U/L Final       Medication changes:  Saline nasal spray as needed due to epistaxis  A&D ointment for epistaxis    Notes:    Fingerstick INR drawn per clinic protocol. Patient states no visible blood in urine and no black tarry stool. Denies any missed doses of warfarin. No change in other maintenance medications or in diet. Will recheck INR in 4 weeks. Patient acknowledges working in consult agreement with pharmacist as referred by his/her physician.     Patient saw ENT on 2/15/24 due to epistaxis in ER on 2/10/24.  Dr Garcia had patient hold warfarin although her INR was subtherapeutic in ER.  ENT had patient restart warfarin on 24.  NO more nose bleeds.      For Pharmacy Admin Tracking Only    Intervention Detail: Adherence Monitorin and New Rx: 2, reason: Needs Additional Therapy  Total # of Interventions Recommended: 2  Total # of Interventions Accepted: 2  Time Spent (min): 20      Kanika Jeff RPH, PharmD

## 2024-03-05 PROBLEM — R04.0 EPISTAXIS: Status: RESOLVED | Noted: 2024-02-12 | Resolved: 2024-03-05

## 2024-03-28 ENCOUNTER — HOSPITAL ENCOUNTER (OUTPATIENT)
Dept: PHARMACY | Age: 84
Setting detail: THERAPIES SERIES
Discharge: HOME | End: 2024-03-28
Payer: MEDICARE

## 2024-03-28 VITALS
BODY MASS INDEX: 24.14 KG/M2 | DIASTOLIC BLOOD PRESSURE: 55 MMHG | WEIGHT: 132 LBS | SYSTOLIC BLOOD PRESSURE: 103 MMHG | HEART RATE: 54 BPM

## 2024-03-28 DIAGNOSIS — I48.0 PAROXYSMAL ATRIAL FIBRILLATION (HCC): ICD-10-CM

## 2024-03-28 DIAGNOSIS — Z79.01 ENCOUNTER FOR CURRENT LONG-TERM USE OF ANTICOAGULANTS: Primary | ICD-10-CM

## 2024-03-28 LAB — INR BLD: 2.3

## 2024-03-28 PROCEDURE — 85610 PROTHROMBIN TIME: CPT

## 2024-03-28 PROCEDURE — 99211 OFF/OP EST MAY X REQ PHY/QHP: CPT

## 2024-03-28 NOTE — PROGRESS NOTES
[facility]  [facility]ication [facility]  ANTICOAGULATION    Referring Provider: Dr Garcia    GOAL INR: 2 - 3    TODAY'S INR: 2.3    WARFARIN Dosage: Continue warfarin 3 mg tablet  and 1.5 tablets for 4.5 mg all other days.     INR (no units)   Date Value   2024 2.2   2024 1.6   2024 3   2023 2.7   2023 2   10/05/2023 3.2   2023 2.6       Hemoglobin   Date Value Ref Range Status   2024 11.9 11.9 - 15.1 g/dL Final     Hematocrit   Date Value Ref Range Status   2024 36.7 36.3 - 47.1 % Final     ALT   Date Value Ref Range Status   2022 16 5 - 33 U/L Final     AST   Date Value Ref Range Status   2022 25 <32 U/L Final       Medication changes:  none    Notes:    Fingerstick INR drawn per clinic protocol. Patient states no visible blood in urine and no black tarry stool. Denies any missed doses of warfarin. No change in other maintenance medications or in diet. Will recheck INR in 6 weeks. Patient acknowledges working in consult agreement with pharmacist as referred by his/her physician.                  For Pharmacy Admin Tracking Only    Intervention Detail: Adherence Monitorin  Total # of Interventions Recommended: 1  Total # of Interventions Accepted: 1  Time Spent (min): 20      Kanika Jeff RPH, PharmD

## 2024-03-28 NOTE — PATIENT INSTRUCTIONS
Continue to monitor urine and stool for signs and symptoms of bleeding.   Please notify the clinic of any medication changes.   Please remember to bring all medications (both prescription and OTC) to your next visit.   Kindly notify the clinic if you are unable to make to your next appointment.   Follow warfarin dosing instructions on dosing calendar provided.

## 2024-04-13 DIAGNOSIS — I34.0 SEVERE MITRAL REGURGITATION: ICD-10-CM

## 2024-04-13 DIAGNOSIS — Z95.2 H/O MITRAL VALVE REPLACEMENT: ICD-10-CM

## 2024-04-13 DIAGNOSIS — I48.0 PAF (PAROXYSMAL ATRIAL FIBRILLATION) (HCC): ICD-10-CM

## 2024-04-13 DIAGNOSIS — I50.32 CHRONIC DIASTOLIC HEART FAILURE (HCC): ICD-10-CM

## 2024-04-15 RX ORDER — METOPROLOL SUCCINATE 25 MG/1
TABLET, EXTENDED RELEASE ORAL
Qty: 45 TABLET | Refills: 3 | Status: SHIPPED | OUTPATIENT
Start: 2024-04-15

## 2024-05-09 ENCOUNTER — HOSPITAL ENCOUNTER (OUTPATIENT)
Dept: PHARMACY | Age: 84
Setting detail: THERAPIES SERIES
Discharge: HOME | End: 2024-05-09
Payer: MEDICARE

## 2024-05-09 VITALS
WEIGHT: 133 LBS | SYSTOLIC BLOOD PRESSURE: 108 MMHG | BODY MASS INDEX: 24.33 KG/M2 | DIASTOLIC BLOOD PRESSURE: 60 MMHG | HEART RATE: 48 BPM

## 2024-05-09 DIAGNOSIS — Z79.01 ENCOUNTER FOR CURRENT LONG-TERM USE OF ANTICOAGULANTS: Primary | ICD-10-CM

## 2024-05-09 DIAGNOSIS — I48.0 PAROXYSMAL ATRIAL FIBRILLATION (HCC): ICD-10-CM

## 2024-05-09 LAB — INR BLD: 2.5

## 2024-05-09 PROCEDURE — 99211 OFF/OP EST MAY X REQ PHY/QHP: CPT

## 2024-05-09 PROCEDURE — 85610 PROTHROMBIN TIME: CPT

## 2024-05-09 NOTE — PROGRESS NOTES
[facility]  [facility]ication [facility]  ANTICOAGULATION    Referring Provider: Dr Garcia     GOAL INR: 2 - 3     TODAY'S INR: 2.5     WARFARIN Dosage: Continue warfarin 3 mg tablet  and 1.5 tablets for 4.5 mg all other days.     INR (no units)   Date Value   2024 2.3   2024 2.2   2024 1.6   2024 3   2023 2.7   2023 2   10/05/2023 3.2       Hemoglobin   Date Value Ref Range Status   2024 11.9 11.9 - 15.1 g/dL Final     Hematocrit   Date Value Ref Range Status   2024 36.7 36.3 - 47.1 % Final     ALT   Date Value Ref Range Status   2022 16 5 - 33 U/L Final     AST   Date Value Ref Range Status   2022 25 <32 U/L Final       Medication changes:  none    Notes:    Fingerstick INR drawn per clinic protocol. Patient states no visible blood in urine and no black tarry stool. Denies any missed doses of warfarin. No change in other maintenance medications or in diet. Will recheck INR in 6 weeks. Patient acknowledges working in consult agreement with pharmacist as referred by his/her physician.                  For Pharmacy Admin Tracking Only    Intervention Detail: Adherence Monitorin  Total # of Interventions Recommended: 1  Total # of Interventions Accepted: 1  Time Spent (min): 20      Kanika Jeff RPH, PharmD

## 2024-07-05 ENCOUNTER — HOSPITAL ENCOUNTER (OUTPATIENT)
Dept: PHARMACY | Age: 84
Setting detail: THERAPIES SERIES
Discharge: HOME | End: 2024-07-05
Payer: MEDICARE

## 2024-07-05 VITALS
BODY MASS INDEX: 24.14 KG/M2 | DIASTOLIC BLOOD PRESSURE: 52 MMHG | SYSTOLIC BLOOD PRESSURE: 99 MMHG | WEIGHT: 132 LBS | HEART RATE: 48 BPM

## 2024-07-05 DIAGNOSIS — Z79.01 ENCOUNTER FOR CURRENT LONG-TERM USE OF ANTICOAGULANTS: Primary | ICD-10-CM

## 2024-07-05 DIAGNOSIS — I48.0 PAROXYSMAL ATRIAL FIBRILLATION (HCC): ICD-10-CM

## 2024-07-05 LAB — INR BLD: 2.9

## 2024-07-05 PROCEDURE — 99211 OFF/OP EST MAY X REQ PHY/QHP: CPT

## 2024-07-05 PROCEDURE — 85610 PROTHROMBIN TIME: CPT

## 2024-07-05 NOTE — PROGRESS NOTES
[facility]  [facility]  ANTICOAGULATION    Referring Provider: Dr Garcia     GOAL INR: 2 - 3     TODAY'S INR: 2.9     WARFARIN Dosage: Continue warfarin 3 mg tablet  and 1.5 tablets for 4.5 mg all other days.     INR (no units)   Date Value   2024 2.9   2024 2.5   2024 2.3   2024 2.2   2024 1.6   2024 3   2023 2.7       Hemoglobin   Date Value Ref Range Status   2024 11.9 11.9 - 15.1 g/dL Final     Hematocrit   Date Value Ref Range Status   2024 36.7 36.3 - 47.1 % Final     ALT   Date Value Ref Range Status   2022 16 5 - 33 U/L Final     AST   Date Value Ref Range Status   2022 25 <32 U/L Final       Medication changes:  No changes    Notes:    Fingerstick INR drawn per clinic protocol. Patient states no visible blood in urine and no black tarry stool. Denies any missed doses of warfarin. No change in other maintenance medications or in diet. Will recheck INR in 8 weeks per patient request. Patient acknowledges working in consult agreement with pharmacist as referred by his/her physician.                  For Pharmacy Admin Tracking Only    Intervention Detail: Adherence Monitorin  Total # of Interventions Recommended: 1  Total # of Interventions Accepted: 1  Time Spent (min): 20     Anabela Edwards [facility]

## 2024-07-22 PROBLEM — M75.42 IMPINGEMENT SYNDROME OF LEFT SHOULDER: Status: ACTIVE | Noted: 2024-07-22

## 2024-07-31 ENCOUNTER — HOSPITAL ENCOUNTER (OUTPATIENT)
Dept: WOMENS IMAGING | Age: 84
Discharge: HOME | End: 2024-08-02
Attending: INTERNAL MEDICINE
Payer: MEDICARE

## 2024-07-31 DIAGNOSIS — Z78.0 POSTMENOPAUSAL: ICD-10-CM

## 2024-07-31 PROCEDURE — 77080 DXA BONE DENSITY AXIAL: CPT

## 2024-08-19 ENCOUNTER — HOSPITAL ENCOUNTER (OUTPATIENT)
Age: 84
Discharge: HOME | End: 2024-08-21
Payer: MEDICARE

## 2024-08-19 VITALS
DIASTOLIC BLOOD PRESSURE: 81 MMHG | SYSTOLIC BLOOD PRESSURE: 122 MMHG | HEIGHT: 62 IN | BODY MASS INDEX: 24.11 KG/M2 | WEIGHT: 131 LBS

## 2024-08-19 DIAGNOSIS — R06.02 SHORTNESS OF BREATH: ICD-10-CM

## 2024-08-19 DIAGNOSIS — I48.0 PAROXYSMAL ATRIAL FIBRILLATION (HCC): ICD-10-CM

## 2024-08-19 DIAGNOSIS — I48.11 LONGSTANDING PERSISTENT ATRIAL FIBRILLATION (HCC): ICD-10-CM

## 2024-08-19 LAB
ECHO AO SINUS VALSALVA DIAM: 3 CM
ECHO AO SINUS VALSALVA INDEX: 1.88 CM/M2
ECHO AO ST JNCT DIAM: 2.4 CM
ECHO AV CUSP MM: 1.6 CM
ECHO AV MEAN GRADIENT: 3 MMHG
ECHO AV MEAN VELOCITY: 0.7 M/S
ECHO AV PEAK GRADIENT: 4 MMHG
ECHO AV PEAK VELOCITY: 1 M/S
ECHO AV VELOCITY RATIO: 0.9
ECHO AV VTI: 13.2 CM
ECHO BSA: 1.61 M2
ECHO EST RA PRESSURE: 15 MMHG
ECHO LA AREA 2C: 20.3 CM2
ECHO LA AREA 4C: 14.2 CM2
ECHO LA MAJOR AXIS: 5.1 CM
ECHO LA MINOR AXIS: 5.8 CM
ECHO LA VOL BP: 47 ML (ref 22–52)
ECHO LA VOL MOD A2C: 58 ML (ref 22–52)
ECHO LA VOL MOD A4C: 33 ML (ref 22–52)
ECHO LA VOL/BSA BIPLANE: 29 ML/M2 (ref 16–34)
ECHO LA VOLUME INDEX MOD A2C: 36 ML/M2 (ref 16–34)
ECHO LA VOLUME INDEX MOD A4C: 21 ML/M2 (ref 16–34)
ECHO LV E' LATERAL VELOCITY: 13 CM/S
ECHO LV EDV A2C: 42 ML
ECHO LV EDV A4C: 42 ML
ECHO LV EDV INDEX A4C: 26 ML/M2
ECHO LV EDV NDEX A2C: 26 ML/M2
ECHO LV EJECTION FRACTION A2C: 43 %
ECHO LV EJECTION FRACTION A4C: 50 %
ECHO LV EJECTION FRACTION BIPLANE: 50 % (ref 55–100)
ECHO LV ESV A2C: 24 ML
ECHO LV ESV A4C: 21 ML
ECHO LV ESV INDEX A2C: 15 ML/M2
ECHO LV ESV INDEX A4C: 13 ML/M2
ECHO LV FRACTIONAL SHORTENING: 18 % (ref 28–44)
ECHO LV INTERNAL DIMENSION DIASTOLE INDEX: 2.44 CM/M2
ECHO LV INTERNAL DIMENSION DIASTOLIC: 3.9 CM (ref 3.9–5.3)
ECHO LV INTERNAL DIMENSION SYSTOLIC INDEX: 2 CM/M2
ECHO LV INTERNAL DIMENSION SYSTOLIC: 3.2 CM
ECHO LV IVSD: 1.2 CM (ref 0.6–0.9)
ECHO LV MASS 2D: 140.1 G (ref 67–162)
ECHO LV MASS INDEX 2D: 87.6 G/M2 (ref 43–95)
ECHO LV POSTERIOR WALL DIASTOLIC: 1 CM (ref 0.6–0.9)
ECHO LV RELATIVE WALL THICKNESS RATIO: 0.51
ECHO LVOT AV VTI INDEX: 0.9
ECHO LVOT MEAN GRADIENT: 2 MMHG
ECHO LVOT PEAK GRADIENT: 3 MMHG
ECHO LVOT PEAK VELOCITY: 0.9 M/S
ECHO LVOT VTI: 11.9 CM
ECHO MV E DECELERATION TIME (DT): 106 MS
ECHO MV E VELOCITY: 2.12 M/S
ECHO MV E/E' LATERAL: 16.31
ECHO MV LVOT VTI INDEX: 1.89
ECHO MV MAX VELOCITY: 1.9 M/S
ECHO MV MEAN GRADIENT: 5 MMHG
ECHO MV MEAN VELOCITY: 1 M/S
ECHO MV PEAK GRADIENT: 14 MMHG
ECHO MV VTI: 22.5 CM
ECHO PV MAX VELOCITY: 0.7 M/S
ECHO PV PEAK GRADIENT: 2 MMHG
ECHO RIGHT VENTRICULAR SYSTOLIC PRESSURE (RVSP): 45 MMHG
ECHO TV REGURGITANT MAX VELOCITY: 2.76 M/S
ECHO TV REGURGITANT PEAK GRADIENT: 30 MMHG

## 2024-08-19 PROCEDURE — 93306 TTE W/DOPPLER COMPLETE: CPT

## 2024-08-19 PROCEDURE — 93306 TTE W/DOPPLER COMPLETE: CPT | Performed by: FAMILY MEDICINE

## 2024-08-19 NOTE — PROGRESS NOTES
Explained policies and procedures of an echocardiogram/Doppler study.    Discussed echo finding with Dr. Gross. Dr. Gross said that if Dr. Garcia adjusted  medication, the patient is OK to go. If not, he will adjust them himself.    Patient stated Dr. Garcia did adjust her medication.

## 2024-08-21 ENCOUNTER — OFFICE VISIT (OUTPATIENT)
Dept: CARDIOLOGY | Age: 84
End: 2024-08-21
Payer: MEDICARE

## 2024-08-21 ENCOUNTER — HOSPITAL ENCOUNTER (OUTPATIENT)
Age: 84
Discharge: HOME | End: 2024-08-21
Payer: MEDICARE

## 2024-08-21 VITALS
BODY MASS INDEX: 22.68 KG/M2 | DIASTOLIC BLOOD PRESSURE: 76 MMHG | OXYGEN SATURATION: 99 % | HEART RATE: 126 BPM | SYSTOLIC BLOOD PRESSURE: 106 MMHG | RESPIRATION RATE: 18 BRPM | HEIGHT: 63 IN | WEIGHT: 128 LBS

## 2024-08-21 DIAGNOSIS — I48.0 PAROXYSMAL ATRIAL FIBRILLATION (HCC): ICD-10-CM

## 2024-08-21 DIAGNOSIS — R00.2 PALPITATIONS: ICD-10-CM

## 2024-08-21 DIAGNOSIS — I49.5 TACHY-BRADY SYNDROME (HCC): ICD-10-CM

## 2024-08-21 DIAGNOSIS — I50.32 CHRONIC DIASTOLIC HEART FAILURE (HCC): ICD-10-CM

## 2024-08-21 DIAGNOSIS — Z95.2 S/P MITRAL VALVE REPLACEMENT: ICD-10-CM

## 2024-08-21 DIAGNOSIS — Z79.01 CHRONIC ANTICOAGULATION: ICD-10-CM

## 2024-08-21 DIAGNOSIS — I34.0 SEVERE MITRAL REGURGITATION: Primary | ICD-10-CM

## 2024-08-21 DIAGNOSIS — I34.0 SEVERE MITRAL REGURGITATION: ICD-10-CM

## 2024-08-21 LAB
ANION GAP SERPL CALCULATED.3IONS-SCNC: 8 MMOL/L (ref 9–17)
BUN SERPL-MCNC: 18 MG/DL (ref 8–23)
BUN/CREAT SERPL: 26 (ref 9–20)
CALCIUM SERPL-MCNC: 9.4 MG/DL (ref 8.6–10.4)
CHLORIDE SERPL-SCNC: 100 MMOL/L (ref 98–107)
CO2 SERPL-SCNC: 29 MMOL/L (ref 20–31)
CREAT SERPL-MCNC: 0.7 MG/DL (ref 0.5–0.9)
ERYTHROCYTE [DISTWIDTH] IN BLOOD BY AUTOMATED COUNT: 13.7 % (ref 11.8–14.4)
GFR, ESTIMATED: 86 ML/MIN/1.73M2
GLUCOSE SERPL-MCNC: 95 MG/DL (ref 70–99)
HCT VFR BLD AUTO: 38.7 % (ref 36.3–47.1)
HGB BLD-MCNC: 13.2 G/DL (ref 11.9–15.1)
MCH RBC QN AUTO: 34.7 PG (ref 25.2–33.5)
MCHC RBC AUTO-ENTMCNC: 34.1 G/DL (ref 28.4–34.8)
MCV RBC AUTO: 101.8 FL (ref 82.6–102.9)
NRBC BLD-RTO: 0 PER 100 WBC
PLATELET # BLD AUTO: 151 K/UL (ref 138–453)
PMV BLD AUTO: 8.6 FL (ref 8.1–13.5)
POTASSIUM SERPL-SCNC: 4.7 MMOL/L (ref 3.7–5.3)
RBC # BLD AUTO: 3.8 M/UL (ref 3.95–5.11)
SODIUM SERPL-SCNC: 137 MMOL/L (ref 135–144)
TROPONIN I SERPL HS-MCNC: 11 NG/L (ref 0–14)
WBC OTHER # BLD: 5.5 K/UL (ref 3.5–11.3)

## 2024-08-21 PROCEDURE — 93000 ELECTROCARDIOGRAM COMPLETE: CPT | Performed by: FAMILY MEDICINE

## 2024-08-21 PROCEDURE — 36415 COLL VENOUS BLD VENIPUNCTURE: CPT

## 2024-08-21 PROCEDURE — 80048 BASIC METABOLIC PNL TOTAL CA: CPT

## 2024-08-21 PROCEDURE — 1123F ACP DISCUSS/DSCN MKR DOCD: CPT | Performed by: FAMILY MEDICINE

## 2024-08-21 PROCEDURE — 84484 ASSAY OF TROPONIN QUANT: CPT

## 2024-08-21 PROCEDURE — 99214 OFFICE O/P EST MOD 30 MIN: CPT | Performed by: FAMILY MEDICINE

## 2024-08-21 PROCEDURE — 85027 COMPLETE CBC AUTOMATED: CPT

## 2024-08-21 NOTE — PROGRESS NOTES
Goal INR 2-3.  I also reminded her to watch for signs of blood in her stool or black tarry stools and stop the medication immediately if this develops as this could be life threatening.  Additional Testing List: Additional Testing List: I took the liberty of ordering a BMP for today to assess their potassium and renal function. I told them that they could get their lab work performed at the location of their choosing, unfortunately, if the lab work was not performed at a [facility] I could not guarantee my ability to follow up with them on their results.  and  I took the liberty of ordering a CBC. I told them that they could get their lab work performed at the location of their choosing, unfortunately, if the lab work was not performed at a [facility] I could not guarantee my ability to follow up with them on their results.  and Cardioversion: Because of this, I spent about 10 minutes discussing the multiple treatment options including rate control and rhythm control as well as medication and ablation therapies. In the end I recommended that they consider proceeding with a cardioversion to see if this could help improve their symptoms. I also discussed the risks, benefits, and alternatives of the procedure including the risk of arrythmia, stroke, heart attack, death, or the need for further procedures.  I also discussed the alternate treatment strategy of simple medical management without cardioversion and just trying to maximize things with medications. The patient verbalized understanding of the risks benefits and stated that they would like to proceed with the cardioversion. I also ordered a troponin to be done today.   We also discussed possibly doing an ablation in the future, I am going to refer to Dr. Stone at [facility].     Chronic diastolic heart failure: New York Heart Association Class: IIa (Mild symptoms only in normal activities)  Beta Blocker: Continue Metoprolol succinate (Toprol XL)

## 2024-08-22 ENCOUNTER — HOSPITAL ENCOUNTER (OUTPATIENT)
Age: 84
Setting detail: OUTPATIENT SURGERY
Discharge: HOME | End: 2024-08-22
Attending: FAMILY MEDICINE | Admitting: FAMILY MEDICINE
Payer: MEDICARE

## 2024-08-22 VITALS
SYSTOLIC BLOOD PRESSURE: 91 MMHG | DIASTOLIC BLOOD PRESSURE: 46 MMHG | HEART RATE: 58 BPM | RESPIRATION RATE: 17 BRPM | TEMPERATURE: 96.8 F | OXYGEN SATURATION: 95 %

## 2024-08-22 DIAGNOSIS — I48.91 A-FIB (HCC): ICD-10-CM

## 2024-08-22 PROBLEM — I48.92 ATRIAL FLUTTER (HCC): Status: ACTIVE | Noted: 2024-08-22

## 2024-08-22 LAB
EKG ATRIAL RATE: 124 BPM
EKG ATRIAL RATE: 59 BPM
EKG P AXIS: 42 DEGREES
EKG P-R INTERVAL: 172 MS
EKG P-R INTERVAL: 176 MS
EKG Q-T INTERVAL: 302 MS
EKG Q-T INTERVAL: 412 MS
EKG QRS DURATION: 100 MS
EKG QRS DURATION: 98 MS
EKG QTC CALCULATION (BAZETT): 407 MS
EKG QTC CALCULATION (BAZETT): 433 MS
EKG R AXIS: 13 DEGREES
EKG R AXIS: 4 DEGREES
EKG T AXIS: 38 DEGREES
EKG T AXIS: 9 DEGREES
EKG VENTRICULAR RATE: 124 BPM
EKG VENTRICULAR RATE: 59 BPM

## 2024-08-22 PROCEDURE — 6360000002 HC RX W HCPCS

## 2024-08-22 PROCEDURE — 92960 CARDIOVERSION ELECTRIC EXT: CPT | Performed by: FAMILY MEDICINE

## 2024-08-22 PROCEDURE — 7100000011 HC PHASE II RECOVERY - ADDTL 15 MIN: Performed by: FAMILY MEDICINE

## 2024-08-22 PROCEDURE — 6360000002 HC RX W HCPCS: Performed by: FAMILY MEDICINE

## 2024-08-22 PROCEDURE — 7100000010 HC PHASE II RECOVERY - FIRST 15 MIN: Performed by: FAMILY MEDICINE

## 2024-08-22 RX ORDER — SODIUM CHLORIDE 9 MG/ML
INJECTION, SOLUTION INTRAVENOUS PRN
Status: DISCONTINUED | OUTPATIENT
Start: 2024-08-22 | End: 2024-08-22 | Stop reason: HOSPADM

## 2024-08-22 RX ORDER — SODIUM CHLORIDE 0.9 % (FLUSH) 0.9 %
5-40 SYRINGE (ML) INJECTION EVERY 12 HOURS SCHEDULED
Status: DISCONTINUED | OUTPATIENT
Start: 2024-08-22 | End: 2024-08-22 | Stop reason: HOSPADM

## 2024-08-22 RX ORDER — SODIUM CHLORIDE 0.9 % (FLUSH) 0.9 %
5-40 SYRINGE (ML) INJECTION PRN
Status: DISCONTINUED | OUTPATIENT
Start: 2024-08-22 | End: 2024-08-22 | Stop reason: HOSPADM

## 2024-08-22 RX ORDER — MIDAZOLAM HYDROCHLORIDE 1 MG/ML
INJECTION INTRAMUSCULAR; INTRAVENOUS PRN
Status: DISCONTINUED | OUTPATIENT
Start: 2024-08-22 | End: 2024-08-22 | Stop reason: HOSPADM

## 2024-08-22 NOTE — DISCHARGE INSTRUCTIONS
Electrical Cardioversion: What to Expect at Home  Your Recovery  Electrical cardioversion is a treatment for an abnormal heartbeat, such as atrial fibrillation, supraventricular tachycardia, or ventricular tachycardia (VT). It uses a brief electrical shock to reset your heart's rhythm.  After cardioversion, you may have redness, like a sunburn, where the patches or paddles were. The medicines you got to make you sleepy may make you feel drowsy for the rest of the day.  Your doctor may have you take medicines to help the heart beat normally and to prevent blood clots.  This care sheet gives you a general idea about how long it will take for you to recover.  Follow the steps below to feel better as quickly as possible.  How can you care for yourself at home?  Common side effects from sedation:  You will feel sleepy. Rest until the sedation has worn off.  Nausea and vomiting is common and usually does not last long.  Don't do anything for 24 hours that requires attention to detail or until the effects of sedation completely wear off.  Do not drive or operate any machinery until the medicine wears off and you can think clearly and react easily.  Don't drink alcohol for 24 hours  Medicines  Be safe with medicines and take them as prescribed. Call your doctor if you think you are having a problem with your medicine.   You may take one or more of the following medicines:  Rate-control medicines to slow the heart rate. These include beta-blockers, calcium channel blockers, and digoxin.  Rhythm control medicines that help the heart keep a normal rhythm.  Blood thinners, also called anticoagulants, which help prevent blood clots.  You will get more details on the specific medicines your doctor prescribes.   Do not take any vitamins, over-the-counter medicines, or herbal products without talking to your doctor first.  Exercise  Start light exercise if your doctor says that it is okay.  Walking is an easy way to get exercise.

## 2024-08-22 NOTE — PROGRESS NOTES
All discharge instructions given with daughter at side. All questions answered at this time. All belongings returned.

## 2024-08-28 ENCOUNTER — ANTI-COAG VISIT (OUTPATIENT)
Age: 84
End: 2024-08-28
Payer: MEDICARE

## 2024-08-28 VITALS
HEART RATE: 46 BPM | BODY MASS INDEX: 23.22 KG/M2 | SYSTOLIC BLOOD PRESSURE: 124 MMHG | DIASTOLIC BLOOD PRESSURE: 58 MMHG | WEIGHT: 129 LBS

## 2024-08-28 DIAGNOSIS — M81.0 OSTEOPOROSIS WITHOUT PATHOLOGICAL FRACTURE: Primary | ICD-10-CM

## 2024-08-28 DIAGNOSIS — I48.0 PAROXYSMAL ATRIAL FIBRILLATION (HCC): ICD-10-CM

## 2024-08-28 DIAGNOSIS — Z79.01 ENCOUNTER FOR CURRENT LONG-TERM USE OF ANTICOAGULANTS: Primary | ICD-10-CM

## 2024-08-28 LAB
INTERNATIONAL NORMALIZATION RATIO, POC: 2.6
PROTHROMBIN TIME, POC: 0

## 2024-08-28 PROCEDURE — 85610 PROTHROMBIN TIME: CPT

## 2024-08-28 PROCEDURE — 99211 OFF/OP EST MAY X REQ PHY/QHP: CPT

## 2024-08-28 RX ORDER — ALBUTEROL SULFATE 90 UG/1
4 AEROSOL, METERED RESPIRATORY (INHALATION) PRN
OUTPATIENT
Start: 2024-08-28

## 2024-08-28 RX ORDER — SODIUM CHLORIDE 9 MG/ML
INJECTION, SOLUTION INTRAVENOUS CONTINUOUS
OUTPATIENT
Start: 2024-08-28

## 2024-08-28 RX ORDER — EPINEPHRINE 1 MG/ML
0.3 INJECTION, SOLUTION, CONCENTRATE INTRAVENOUS PRN
OUTPATIENT
Start: 2024-08-28

## 2024-08-28 RX ORDER — DIPHENHYDRAMINE HYDROCHLORIDE 50 MG/ML
50 INJECTION INTRAMUSCULAR; INTRAVENOUS
OUTPATIENT
Start: 2024-08-28

## 2024-08-28 RX ORDER — ONDANSETRON 2 MG/ML
8 INJECTION INTRAMUSCULAR; INTRAVENOUS
OUTPATIENT
Start: 2024-08-28

## 2024-08-28 RX ORDER — ACETAMINOPHEN 325 MG/1
650 TABLET ORAL
OUTPATIENT
Start: 2024-08-28

## 2024-08-28 NOTE — PROGRESS NOTES
[facility]  Medication Management  ANTICOAGULATION    Referring Provider: Dr Garcia     GOAL INR: 2 - 3     TODAY'S INR: 2.6     WARFARIN Dosage: Continue warfarin 3 mg tablet  and 1.5 tablets for 4.5 mg all other days.     INR (no units)   Date Value   2024 2.9   2024 2.5   2024 2.3   2024 2.2   2024 1.6   2024 3   2023 2.7       Hemoglobin   Date Value Ref Range Status   2024 13.2 11.9 - 15.1 g/dL Final     Hematocrit   Date Value Ref Range Status   2024 38.7 36.3 - 47.1 % Final     ALT   Date Value Ref Range Status   2022 16 5 - 33 U/L Final     AST   Date Value Ref Range Status   2022 25 <32 U/L Final       Medication changes:  none    Notes:    Fingerstick INR drawn per clinic protocol. Patient states no visible blood in urine and no black tarry stool. Denies any missed doses of warfarin. No change in other maintenance medications or in diet. Will recheck INR in 6 weeks. Patient acknowledges working in consult agreement with pharmacist as referred by his/her physician.  Electro-cardioversion 24.  Patient is scheduled to have stress test and ECHO.                 For Pharmacy Admin Tracking Only    Intervention Detail: Adherence Monitorin  Total # of Interventions Recommended: 1  Total # of Interventions Accepted: 1  Time Spent (min): 20      Kanika Jeff RP, PharmD

## 2024-08-29 ENCOUNTER — APPOINTMENT (OUTPATIENT)
Dept: GENERAL RADIOLOGY | Age: 84
End: 2024-08-29
Payer: MEDICARE

## 2024-08-29 ENCOUNTER — TELEPHONE (OUTPATIENT)
Dept: CARDIOLOGY | Age: 84
End: 2024-08-29

## 2024-08-29 ENCOUNTER — CLINICAL DOCUMENTATION (OUTPATIENT)
Facility: HOSPITAL | Age: 84
End: 2024-08-29

## 2024-08-29 ENCOUNTER — HOSPITAL ENCOUNTER (EMERGENCY)
Age: 84
Discharge: HOME | End: 2024-08-29
Payer: MEDICARE

## 2024-08-29 VITALS
HEART RATE: 103 BPM | DIASTOLIC BLOOD PRESSURE: 95 MMHG | OXYGEN SATURATION: 97 % | RESPIRATION RATE: 11 BRPM | TEMPERATURE: 98 F | SYSTOLIC BLOOD PRESSURE: 131 MMHG

## 2024-08-29 DIAGNOSIS — R79.89 ELEVATED BRAIN NATRIURETIC PEPTIDE (BNP) LEVEL: ICD-10-CM

## 2024-08-29 DIAGNOSIS — I48.0 PAF (PAROXYSMAL ATRIAL FIBRILLATION) (HCC): ICD-10-CM

## 2024-08-29 DIAGNOSIS — R00.0 SINUS TACHYCARDIA: Primary | ICD-10-CM

## 2024-08-29 DIAGNOSIS — Z95.2 H/O MITRAL VALVE REPLACEMENT: ICD-10-CM

## 2024-08-29 DIAGNOSIS — I50.32 CHRONIC DIASTOLIC HEART FAILURE (HCC): ICD-10-CM

## 2024-08-29 DIAGNOSIS — I34.0 SEVERE MITRAL REGURGITATION: ICD-10-CM

## 2024-08-29 LAB
ANION GAP SERPL CALCULATED.3IONS-SCNC: 8 MMOL/L (ref 9–16)
BASOPHILS # BLD: 0.04 K/UL (ref 0–0.2)
BASOPHILS NFR BLD: 1 % (ref 0–2)
BILIRUB UR QL STRIP: NEGATIVE
BNP SERPL-MCNC: 896 PG/ML (ref 0–450)
BUN SERPL-MCNC: 12 MG/DL (ref 8–23)
BUN/CREAT SERPL: 17 (ref 9–20)
CALCIUM SERPL-MCNC: 9.4 MG/DL (ref 8.6–10.4)
CHLORIDE SERPL-SCNC: 102 MMOL/L (ref 98–107)
CLARITY UR: CLEAR
CO2 SERPL-SCNC: 29 MMOL/L (ref 20–31)
COLOR UR: YELLOW
CREAT SERPL-MCNC: 0.7 MG/DL (ref 0.5–0.9)
EOSINOPHIL # BLD: 0.08 K/UL (ref 0–0.44)
EOSINOPHILS RELATIVE PERCENT: 2 % (ref 1–4)
EPI CELLS #/AREA URNS HPF: NORMAL /HPF (ref 0–25)
ERYTHROCYTE [DISTWIDTH] IN BLOOD BY AUTOMATED COUNT: 13.9 % (ref 11.8–14.4)
GFR, ESTIMATED: 86 ML/MIN/1.73M2
GLUCOSE SERPL-MCNC: 92 MG/DL (ref 74–99)
GLUCOSE UR STRIP-MCNC: NEGATIVE MG/DL
HCT VFR BLD AUTO: 39.3 % (ref 36.3–47.1)
HGB BLD-MCNC: 13.3 G/DL (ref 11.9–15.1)
HGB UR QL STRIP.AUTO: ABNORMAL
IMM GRANULOCYTES # BLD AUTO: <0.03 K/UL (ref 0–0.3)
IMM GRANULOCYTES NFR BLD: 1 %
INR PPP: 2.3
KETONES UR STRIP-MCNC: NEGATIVE MG/DL
LEUKOCYTE ESTERASE UR QL STRIP: NEGATIVE
LYMPHOCYTES NFR BLD: 1.06 K/UL (ref 1.1–3.7)
LYMPHOCYTES RELATIVE PERCENT: 24 % (ref 24–43)
MCH RBC QN AUTO: 34.8 PG (ref 25.2–33.5)
MCHC RBC AUTO-ENTMCNC: 33.8 G/DL (ref 28.4–34.8)
MCV RBC AUTO: 102.9 FL (ref 82.6–102.9)
MONOCYTES NFR BLD: 0.43 K/UL (ref 0.1–1.2)
MONOCYTES NFR BLD: 10 % (ref 3–12)
NEUTROPHILS NFR BLD: 62 % (ref 36–65)
NEUTS SEG NFR BLD: 2.71 K/UL (ref 1.5–8.1)
NITRITE UR QL STRIP: NEGATIVE
NRBC BLD-RTO: 0 PER 100 WBC
PH UR STRIP: 7.5 [PH] (ref 5–9)
PLATELET # BLD AUTO: 162 K/UL (ref 138–453)
PMV BLD AUTO: 9.1 FL (ref 8.1–13.5)
POTASSIUM SERPL-SCNC: 4.3 MMOL/L (ref 3.7–5.3)
PROT UR STRIP-MCNC: NEGATIVE MG/DL
PROTHROMBIN TIME: 23.9 SEC (ref 11.7–14.1)
RBC # BLD AUTO: 3.82 M/UL (ref 3.95–5.11)
RBC #/AREA URNS HPF: NORMAL /HPF (ref 0–2)
SODIUM SERPL-SCNC: 139 MMOL/L (ref 136–145)
SP GR UR STRIP: 1.01 (ref 1.01–1.02)
TROPONIN I SERPL HS-MCNC: 10 NG/L (ref 0–14)
UROBILINOGEN UR STRIP-ACNC: NORMAL EU/DL (ref 0–1)
WBC #/AREA URNS HPF: NORMAL /HPF (ref 0–5)
WBC OTHER # BLD: 4.3 K/UL (ref 3.5–11.3)

## 2024-08-29 PROCEDURE — 85610 PROTHROMBIN TIME: CPT

## 2024-08-29 PROCEDURE — 84484 ASSAY OF TROPONIN QUANT: CPT

## 2024-08-29 PROCEDURE — 85025 COMPLETE CBC W/AUTO DIFF WBC: CPT

## 2024-08-29 PROCEDURE — 93005 ELECTROCARDIOGRAM TRACING: CPT | Performed by: NURSE PRACTITIONER

## 2024-08-29 PROCEDURE — 80048 BASIC METABOLIC PNL TOTAL CA: CPT

## 2024-08-29 PROCEDURE — 36415 COLL VENOUS BLD VENIPUNCTURE: CPT

## 2024-08-29 PROCEDURE — 83880 ASSAY OF NATRIURETIC PEPTIDE: CPT

## 2024-08-29 PROCEDURE — 71045 X-RAY EXAM CHEST 1 VIEW: CPT

## 2024-08-29 PROCEDURE — 81001 URINALYSIS AUTO W/SCOPE: CPT

## 2024-08-29 PROCEDURE — 99285 EMERGENCY DEPT VISIT HI MDM: CPT

## 2024-08-29 PROCEDURE — 6370000000 HC RX 637 (ALT 250 FOR IP): Performed by: NURSE PRACTITIONER

## 2024-08-29 RX ORDER — METOPROLOL SUCCINATE 25 MG/1
25 TABLET, EXTENDED RELEASE ORAL 2 TIMES DAILY
Qty: 60 TABLET | Refills: 0 | Status: SHIPPED | OUTPATIENT
Start: 2024-08-29 | End: 2024-09-28

## 2024-08-29 RX ORDER — FUROSEMIDE 40 MG
20 TABLET ORAL ONCE
Status: COMPLETED | OUTPATIENT
Start: 2024-08-29 | End: 2024-08-29

## 2024-08-29 RX ORDER — METOPROLOL SUCCINATE 25 MG/1
25 TABLET, EXTENDED RELEASE ORAL EVERY 12 HOURS
Qty: 30 TABLET | Refills: 0 | Status: SHIPPED | OUTPATIENT
Start: 2024-08-29

## 2024-08-29 RX ORDER — METOPROLOL SUCCINATE 25 MG/1
12.5 TABLET, EXTENDED RELEASE ORAL DAILY
Status: DISCONTINUED | OUTPATIENT
Start: 2024-08-29 | End: 2024-08-29 | Stop reason: HOSPADM

## 2024-08-29 RX ADMIN — FUROSEMIDE 20 MG: 40 TABLET ORAL at 17:46

## 2024-08-29 RX ADMIN — METOPROLOL SUCCINATE 12.5 MG: 25 TABLET, EXTENDED RELEASE ORAL at 17:44

## 2024-08-29 ASSESSMENT — HEART SCORE
ECG: NON-SPECIFC REPOLARIZATION DISTURBANCE/LBTB/PM
ECG: NON-SPECIFC REPOLARIZATION DISTURBANCE/LBTB/PM

## 2024-08-29 NOTE — PROGRESS NOTES
Patient Assistance                   Additional notes: Reviewed chart and no assistance is available. Patient ahs $[phone] left on OOP for 2024

## 2024-08-29 NOTE — ED PROVIDER NOTES
[facility] ED  EMERGENCY DEPARTMENT ENCOUNTER      Pt Name: Aysha Springer  MRN: [id number]  Birthdate 1940  Date of evaluation: 8/29/2024  Provider: KATHRYN Hampton CNP  7:18 PM    CHIEF COMPLAINT       Chief Complaint   Patient presents with    Chest Pain     Ongoing since 1400.     Tachycardia     Patient states that she has had various episodes of tachycardia over the past few days. Patient recently had a cardioversion done for this issue last week.   Patient takes 12.5 metoprolol at home and did take an extra tablet this morning.          HISTORY OF PRESENT ILLNESS        Aysha Springer 82 yo female presents from home to ED with c/o headache, nasal congestion, chest pain, palpitations and fast heart rate since 2 pm.Had milder episode last evening. Patient states that she has had various episodes of tachycardia over the past few days. Patient recently had a cardioversion done for this issue last week.   Patient takes 12.5 metoprolol at home and did take an extra tablet this morning.  Medications recently changed. Negative Covid test at home.     PMH: Cancer, COVID-19-Moderna vaccine, epistasis, essential hypertension, fracture of sacrum, GERD, irritable bowel syndrome, mitral valve prolapse, osteoarthritis, osteopenia, paroxysmal atrial fibrillation, rosacea, tendinitis left shoulder    The history is provided by the patient and medical records. No  was used.   Patient endorses 6 out of 10, worsening    Nursing Notes were reviewed.    REVIEW OF SYSTEMS       Review of Systems   HENT:  Positive for congestion.    Cardiovascular:  Positive for chest pain and palpitations.   Neurological:  Positive for headaches.   All other systems reviewed and are negative.      Except as noted above the remainder of the review of systems was reviewed and negative.       PAST MEDICAL HISTORY     Past Medical History:   Diagnosis Date    Cancer (HCC)     COVID-19 virus infection /    High Blood Pressure Father     Arthritis Sister     Atrial Fibrillation Sister     Diabetes Brother     Heart Attack Brother         60s    Arthritis Sister           SOCIAL HISTORY       Social History     Socioeconomic History    Marital status:    Tobacco Use    Smoking status: Never    Smokeless tobacco: Never   Vaping Use    Vaping status: Never Used   Substance and Sexual Activity    Alcohol use: Never     Comment: occasionally    Drug use: No     Social Determinants of Health     Financial Resource Strain: Low Risk  (3/5/2024)    Overall Financial Resource Strain (CARDIA)     Difficulty of Paying Living Expenses: Not hard at all   Food Insecurity: No Food Insecurity (3/5/2024)    Hunger Vital Sign     Worried About Running Out of Food in the Last Year: Never true     Ran Out of Food in the Last Year: Never true   Transportation Needs: No Transportation Needs (3/5/2024)    PRAPARE - Transportation     Lack of Transportation (Medical): No     Lack of Transportation (Non-Medical): No   Physical Activity: Insufficiently Active (3/5/2024)    Exercise Vital Sign     Days of Exercise per Week: 3 days     Minutes of Exercise per Session: 40 min   Stress: No Stress Concern Present (3/5/2024)    [place] [employer] - Occupational Stress Questionnaire     Feeling of Stress : Only a little   Social Connections: Moderately Integrated (3/5/2024)    Social Connection and Isolation Panel [NHANES]     Frequency of Communication with Friends and Family: More than three times a week     Frequency of Social Gatherings with Friends and Family: Three times a week     Attends Spiritism Services: Never     Active Member of Clubs or Organizations: Yes     Attends Club or Organization Meetings: More than 4 times per year     Marital Status:    Intimate Partner Violence: Not At Risk (3/5/2024)    Humiliation, Afraid, Rape, and Kick questionnaire     Fear of Current or Ex-Partner: No     Emotionally

## 2024-08-29 NOTE — TELEPHONE ENCOUNTER
Ok. Thank you. This is unfurtunate that we could not get them set up with Bertha sooner. Thank you for trying.

## 2024-08-29 NOTE — DISCHARGE INSTRUCTIONS
Increase ToprolXL to 25 mg twice daily  If HR 80 or below decrease to 1/2 tablet or 12.5 mg twice daily.

## 2024-08-29 NOTE — TELEPHONE ENCOUNTER
Ms. Springer's daughter called into the office and wanted to let us know that she still has not heard anything from Dr. Stone's office. (I will look into this). Also wanted to let us know that Ms. Springer went back into atrial fibrillation last week and did not want to come out to the ER. She did convert back with in an hour. She was very tired after this episode.     I advised her that next time this happens to COME to the ER so we could possible then transfer her to [facility] to see Dr. Bertha HUERTA and get her the care she needs.     I again will call Dr. Stone's office to hopefully get her an apt. Also she asked if you could reach out to him as well.     Thanks!

## 2024-08-30 LAB
EKG ATRIAL RATE: 109 BPM
EKG ATRIAL RATE: 222 BPM
EKG P AXIS: 123 DEGREES
EKG P-R INTERVAL: 120 MS
EKG Q-T INTERVAL: 316 MS
EKG Q-T INTERVAL: 318 MS
EKG QRS DURATION: 86 MS
EKG QRS DURATION: 92 MS
EKG QTC CALCULATION (BAZETT): 428 MS
EKG QTC CALCULATION (BAZETT): 429 MS
EKG R AXIS: 11 DEGREES
EKG R AXIS: 22 DEGREES
EKG T AXIS: 43 DEGREES
EKG T AXIS: 58 DEGREES
EKG VENTRICULAR RATE: 109 BPM
EKG VENTRICULAR RATE: 111 BPM

## 2024-08-30 PROCEDURE — 93010 ELECTROCARDIOGRAM REPORT: CPT | Performed by: INTERNAL MEDICINE

## 2024-10-04 ENCOUNTER — OFFICE VISIT (OUTPATIENT)
Dept: CARDIOLOGY | Age: 84
End: 2024-10-04
Payer: MEDICARE

## 2024-10-04 VITALS
SYSTOLIC BLOOD PRESSURE: 120 MMHG | HEIGHT: 62 IN | BODY MASS INDEX: 23.89 KG/M2 | OXYGEN SATURATION: 97 % | RESPIRATION RATE: 18 BRPM | WEIGHT: 129.8 LBS | HEART RATE: 55 BPM | DIASTOLIC BLOOD PRESSURE: 63 MMHG

## 2024-10-04 DIAGNOSIS — Z98.890 S/P MAZE OPERATION FOR ATRIAL FIBRILLATION: ICD-10-CM

## 2024-10-04 DIAGNOSIS — Z95.3 S/P MITRAL VALVE REPLACEMENT WITH BIOPROSTHETIC VALVE: ICD-10-CM

## 2024-10-04 DIAGNOSIS — Z86.79 S/P MAZE OPERATION FOR ATRIAL FIBRILLATION: ICD-10-CM

## 2024-10-04 DIAGNOSIS — Z79.899 VISIT FOR MONITORING TIKOSYN THERAPY: ICD-10-CM

## 2024-10-04 DIAGNOSIS — I34.0 SEVERE MITRAL REGURGITATION BY PRIOR ECHOCARDIOGRAM: Primary | ICD-10-CM

## 2024-10-04 DIAGNOSIS — R00.2 PALPITATIONS: ICD-10-CM

## 2024-10-04 DIAGNOSIS — Z51.81 VISIT FOR MONITORING TIKOSYN THERAPY: ICD-10-CM

## 2024-10-04 DIAGNOSIS — Z79.01 CHRONIC ANTICOAGULATION: ICD-10-CM

## 2024-10-04 DIAGNOSIS — I48.0 PAF (PAROXYSMAL ATRIAL FIBRILLATION) (HCC): ICD-10-CM

## 2024-10-04 DIAGNOSIS — I50.32 CHRONIC DIASTOLIC CONGESTIVE HEART FAILURE (HCC): ICD-10-CM

## 2024-10-04 PROCEDURE — 1123F ACP DISCUSS/DSCN MKR DOCD: CPT | Performed by: FAMILY MEDICINE

## 2024-10-04 PROCEDURE — 93000 ELECTROCARDIOGRAM COMPLETE: CPT | Performed by: FAMILY MEDICINE

## 2024-10-04 PROCEDURE — 99214 OFFICE O/P EST MOD 30 MIN: CPT | Performed by: FAMILY MEDICINE

## 2024-10-04 NOTE — PROGRESS NOTES
I, Kenyetta Salazar am scribing for and in the presence of Jose Eduardo Gross MD, MS, F.A.C.C..    Patient: Aysha Springer  : 1940  Date of Visit: 2024    REASON FOR VISIT / CONSULTATION: Atrial Fibrillation (HX: Severe Mitral Regurg, CHF, PAF,Chronic Anticoag. 4 week follow up. Cardioversion on 24. //She has been feeling okay, getting her strength back. Palpitations every day. //Denies: CP, SOB, Lightheaded/dizziness. )    Dear Ren Garcia MD,     I had the pleasure of seeing Aysha Springer today. Ms. Springer is a 84 y.o. female with a history of paroxysmal atrial fibrillation. She reports that this 1st occurred a few year ago and most recently occurred first part of February  but she says she has always had some degree of palpitations. Her last previous sustained event was about a year ago. She also has a history of mitral valve prolapse but denies any heart attacks, other cardiac issues or stroke or mini stroke. She underwent a cardiovascular stress test on 2018 that had show to be consistent with low to intermediate risk of coronary artery disease. Her cardiac catheterization on 2018 had shown mild irregularities with the worst being in her LAD at 20-30%. She was also admitted to the hospital for tikosyn loading on 2018 and has been maintained on this ever since with good results. She had an Echo done on 2020 that showed the left atrium that is severely dilated (>40) with a left atrial volume index of 76 ml/m2. Bileaflet prolapse with more prominent P2 prolapse and moderate to severe mitral regurgitation. Moderate tricuspid regurgitation. Mild pulmonary hypertension with estimated pulmonary artery systolic pressure of 33 mmHg. Moderate diastolic dysfunction. Her EF was 60%. Mitral valve replacement done at [facility] using 29 mm Epic Bioprosthetic valve on 10/06/2020. MAZE procedure done on 10/06/2020 as well as left atrial clipping. Repeat

## 2024-10-08 ENCOUNTER — HOSPITAL ENCOUNTER (OUTPATIENT)
Dept: INFUSION THERAPY | Age: 84
Discharge: HOME | End: 2024-10-08
Payer: MEDICARE

## 2024-10-08 ENCOUNTER — ANTI-COAG VISIT (OUTPATIENT)
Age: 84
End: 2024-10-08
Payer: MEDICARE

## 2024-10-08 VITALS
RESPIRATION RATE: 20 BRPM | SYSTOLIC BLOOD PRESSURE: 126 MMHG | HEART RATE: 55 BPM | TEMPERATURE: 96.2 F | DIASTOLIC BLOOD PRESSURE: 62 MMHG

## 2024-10-08 VITALS
WEIGHT: 128 LBS | BODY MASS INDEX: 23.41 KG/M2 | SYSTOLIC BLOOD PRESSURE: 110 MMHG | HEART RATE: 58 BPM | DIASTOLIC BLOOD PRESSURE: 58 MMHG

## 2024-10-08 DIAGNOSIS — Z79.01 ENCOUNTER FOR CURRENT LONG-TERM USE OF ANTICOAGULANTS: Primary | ICD-10-CM

## 2024-10-08 DIAGNOSIS — I48.0 PAROXYSMAL ATRIAL FIBRILLATION (HCC): ICD-10-CM

## 2024-10-08 DIAGNOSIS — M81.0 OSTEOPOROSIS WITHOUT PATHOLOGICAL FRACTURE: Primary | ICD-10-CM

## 2024-10-08 LAB
INTERNATIONAL NORMALIZATION RATIO, POC: 1.9
PROTHROMBIN TIME, POC: 0

## 2024-10-08 PROCEDURE — 6360000002 HC RX W HCPCS: Performed by: INTERNAL MEDICINE

## 2024-10-08 PROCEDURE — 96372 THER/PROPH/DIAG INJ SC/IM: CPT

## 2024-10-08 PROCEDURE — 95115 IMMUNOTHERAPY ONE INJECTION: CPT

## 2024-10-08 PROCEDURE — 99212 OFFICE O/P EST SF 10 MIN: CPT | Performed by: COUNSELOR

## 2024-10-08 PROCEDURE — 85610 PROTHROMBIN TIME: CPT | Performed by: COUNSELOR

## 2024-10-08 RX ORDER — SODIUM CHLORIDE 9 MG/ML
INJECTION, SOLUTION INTRAVENOUS CONTINUOUS
OUTPATIENT
Start: 2025-04-06

## 2024-10-08 RX ORDER — ACETAMINOPHEN 325 MG/1
650 TABLET ORAL
OUTPATIENT
Start: 2025-04-06

## 2024-10-08 RX ORDER — ONDANSETRON 2 MG/ML
8 INJECTION INTRAMUSCULAR; INTRAVENOUS
OUTPATIENT
Start: 2025-04-06

## 2024-10-08 RX ORDER — DIPHENHYDRAMINE HYDROCHLORIDE 50 MG/ML
50 INJECTION INTRAMUSCULAR; INTRAVENOUS
OUTPATIENT
Start: 2025-04-06

## 2024-10-08 RX ORDER — CALCIUM CARBONATE 500(1250)
500 TABLET ORAL DAILY
COMMUNITY

## 2024-10-08 RX ORDER — WARFARIN SODIUM 3 MG/1
3 TABLET ORAL SEE ADMIN INSTRUCTIONS
Qty: 130 TABLET | Refills: 2 | Status: SHIPPED | OUTPATIENT
Start: 2024-10-08

## 2024-10-08 RX ORDER — EPINEPHRINE 1 MG/ML
0.3 INJECTION, SOLUTION, CONCENTRATE INTRAVENOUS PRN
OUTPATIENT
Start: 2025-04-06

## 2024-10-08 RX ORDER — ALBUTEROL SULFATE 90 UG/1
4 INHALANT RESPIRATORY (INHALATION) PRN
OUTPATIENT
Start: 2025-04-06

## 2024-10-08 RX ADMIN — DENOSUMAB 60 MG: 60 INJECTION SUBCUTANEOUS at 09:53

## 2024-10-08 NOTE — DISCHARGE INSTRUCTIONS
Outpatient Discharge Instructions for Prolia Injections    [address] [address]   [phone]      You are advised to carry out the following Instructions:    Diet:  As prescribed by your Physician.    Activity:  As prescribed by your Physician.      Care of injection site:  If the injection site becomes red,sore,swollen,painful, has drainage,or you develop a fever,notify your Physician.      Other:    If you develop hives,rash,itching or trouble breathing, go to the nearest Emergency Room. These could be a sign of allergic reaction.  Continue to take your calcium and vitamin D.  Let your dentist know that you are taking Prolia.  Let your doctor know if you have any unusual jaw or leg bone pain.  Take good care of your teeth,see a dentist often.   Injection is every 6 months.      Follow up appointment:          ANY PROBLEMS OR CONCERNS NOTIFY YOUR PHYSICIAN   OR    GO THE NEAREST EMERGENCY ROOM

## 2024-10-08 NOTE — PATIENT INSTRUCTIONS
Please take warfarin 6 mg today.   Continue current dose of warfarin as instructed on dosing calendar provided.   Continue to monitor urine and stool for signs and symptoms of bleeding.   Please notify the clinic of any medication changes.   Please remember to bring all medications (both prescription and OTC) to your next visit. Kindly notify the clinic if you are unable to make to your next appointment.

## 2024-10-08 NOTE — PROGRESS NOTES
[facility]  Medication Management  ANTICOAGULATION    Referring Provider: Dr. Garcia    GOAL INR: 2 - 3    TODAY'S INR: 1.9    WARFARIN Dosage: 6 mg x 1, then continue 3 mg po every Friday; 4.5 mg po all other days    INR (no units)   Date Value   2024 2.3   2024 2.9   2024 2.5   2024 2.3   2024 2.2   2024 1.6   2024 3     INR,(POC) (no units)   Date Value   10/08/2024 1.9   2024 2.6       Hemoglobin   Date Value Ref Range Status   2024 13.3 11.9 - 15.1 g/dL Final     Hematocrit   Date Value Ref Range Status   2024 39.3 36.3 - 47.1 % Final     ALT   Date Value Ref Range Status   2022 16 5 - 33 U/L Final     AST   Date Value Ref Range Status   2022 25 <32 U/L Final       Medication changes:  Add Tikosyn    Notes:    Fingerstick INR drawn per clinic protocol. Patient states no visible blood in urine and no black tarry stool. Denies any missed doses of warfarin. No change in other maintenance medications or in diet. Will recheck INR in 6 weeks per patient request. Patient acknowledges working in consult agreement with pharmacist as referred by his/her physician.                Patient was recently admitted at [facility] for atrial fibrillation and was started on Tikosyn 250 mcg po BID.    Prescription sent to [facility] for warfarin 3 mg tablets - Take 1 tablet po on Friday and 1.5 tablets (=4.5 mg) po all other days   Qty:  130  Refill:  2   Prescriber:  Dr. Garcia    For Pharmacy Admin Tracking Only    Intervention Detail: Adherence Monitorin and Dose Adjustment: 1, reason: Improve Adherence, Therapy Optimization  Total # of Interventions Recommended: 2  Total # of Interventions Accepted: 2  Time Spent (min): 20      Anabela Edwards [facility]

## 2024-11-20 ENCOUNTER — ANTI-COAG VISIT (OUTPATIENT)
Age: 84
End: 2024-11-20
Payer: MEDICARE

## 2024-11-20 VITALS — BODY MASS INDEX: 23.41 KG/M2 | SYSTOLIC BLOOD PRESSURE: 108 MMHG | WEIGHT: 128 LBS | DIASTOLIC BLOOD PRESSURE: 60 MMHG

## 2024-11-20 DIAGNOSIS — I48.0 PAROXYSMAL ATRIAL FIBRILLATION (HCC): ICD-10-CM

## 2024-11-20 DIAGNOSIS — Z79.01 ENCOUNTER FOR CURRENT LONG-TERM USE OF ANTICOAGULANTS: Primary | ICD-10-CM

## 2024-11-20 LAB
INTERNATIONAL NORMALIZATION RATIO, POC: 2.3
PROTHROMBIN TIME, POC: 0

## 2024-11-20 PROCEDURE — 99211 OFF/OP EST MAY X REQ PHY/QHP: CPT

## 2024-11-20 PROCEDURE — 85610 PROTHROMBIN TIME: CPT

## 2024-11-20 RX ORDER — DENOSUMAB 60 MG/ML
60 INJECTION SUBCUTANEOUS ONCE
COMMUNITY

## 2024-11-20 NOTE — PROGRESS NOTES
[facility]  [facility]ication [facility]  ANTICOAGULATION    Referring Provider: Dr. Garcia     GOAL INR: 2 - 3     TODAY'S INR: 2.3     WARFARIN Dosage: Continue warfarin 3 mg tablet  and 1.5 tablets for 4.5 mg all other days.    INR (no units)   Date Value   2024 2.3   2024 2.9   2024 2.5   2024 2.3   2024 2.2   2024 1.6   2024 3     INR,(POC) (no units)   Date Value   10/08/2024 1.9   2024 2.6       Hemoglobin   Date Value Ref Range Status   2024 13.3 11.9 - 15.1 g/dL Final     Hematocrit   Date Value Ref Range Status   2024 39.3 36.3 - 47.1 % Final     ALT   Date Value Ref Range Status   2022 16 5 - 33 U/L Final     AST   Date Value Ref Range Status   2022 25 <32 U/L Final       Medication changes:  Prolia  Restarted Tikosyn    Notes:    Fingerstick INR drawn per clinic protocol. Patient states no visible blood in urine and no black tarry stool. Denies any missed doses of warfarin. No change in other maintenance medications or in diet. Will recheck INR in 6 weeks. Patient acknowledges working in consult agreement with pharmacist as referred by his/her physician.                  For Pharmacy Admin Tracking Only    Intervention Detail: Adherence Monitorin  Total # of Interventions Recommended: 1  Total # of Interventions Accepted: 1  Time Spent (min): 20      Kanika Jeff RPH, PharmD

## 2024-12-12 ENCOUNTER — TRANSCRIBE ORDERS (OUTPATIENT)
Dept: ADMINISTRATIVE | Age: 84
End: 2024-12-12

## 2024-12-12 DIAGNOSIS — I70.223 ATHEROSCLEROSIS OF NATIVE ARTERY OF BOTH LOWER EXTREMITIES WITH REST PAIN (HCC): Primary | ICD-10-CM

## 2024-12-30 RX ORDER — DOFETILIDE 0.25 MG/1
250 CAPSULE ORAL 2 TIMES DAILY
Qty: 180 CAPSULE | Refills: 3 | Status: SHIPPED | OUTPATIENT
Start: 2024-12-30

## 2025-01-08 DIAGNOSIS — D68.69 SECONDARY HYPERCOAGULABLE STATE (HCC): ICD-10-CM

## 2025-01-08 DIAGNOSIS — I48.0 PAROXYSMAL ATRIAL FIBRILLATION (HCC): Primary | ICD-10-CM

## 2025-01-09 ENCOUNTER — ANTI-COAG VISIT (OUTPATIENT)
Age: 85
End: 2025-01-09
Payer: MEDICARE

## 2025-01-09 VITALS
DIASTOLIC BLOOD PRESSURE: 56 MMHG | HEART RATE: 61 BPM | WEIGHT: 129 LBS | BODY MASS INDEX: 23.59 KG/M2 | SYSTOLIC BLOOD PRESSURE: 103 MMHG

## 2025-01-09 DIAGNOSIS — Z79.01 ENCOUNTER FOR CURRENT LONG-TERM USE OF ANTICOAGULANTS: Primary | ICD-10-CM

## 2025-01-09 DIAGNOSIS — I48.0 PAROXYSMAL ATRIAL FIBRILLATION (HCC): ICD-10-CM

## 2025-01-09 LAB
INTERNATIONAL NORMALIZATION RATIO, POC: 2.5
PROTHROMBIN TIME, POC: 0

## 2025-01-09 PROCEDURE — 85610 PROTHROMBIN TIME: CPT

## 2025-01-09 PROCEDURE — 99211 OFF/OP EST MAY X REQ PHY/QHP: CPT

## 2025-01-09 NOTE — PROGRESS NOTES
[facility]  [facility]ication [facility]  ANTICOAGULATION    Referring Provider: Dr. Garcia     GOAL INR: 2 - 3     TODAY'S INR: 2.5     WARFARIN Dosage: Continue warfarin 3 mg tablet  and 1.5 tablets for 4.5 mg all other days.    INR (no units)   Date Value   2024 2.3   2024 2.9   2024 2.5   2024 2.3   2024 2.2   2024 1.6   2024 3     INR,(POC) (no units)   Date Value   2024 2.3   10/08/2024 1.9   2024 2.6       Hemoglobin   Date Value Ref Range Status   2024 13.3 11.9 - 15.1 g/dL Final     Hematocrit   Date Value Ref Range Status   2024 39.3 36.3 - 47.1 % Final     ALT   Date Value Ref Range Status   2022 16 5 - 33 U/L Final     AST   Date Value Ref Range Status   2022 25 <32 U/L Final       Medication changes:  NONE    Notes:    Fingerstick INR drawn per clinic protocol. Patient states no visible blood in urine and no black tarry stool. Denies any missed doses of warfarin. No change in other maintenance medications or in diet. Will recheck INR in 6 weeks. Patient acknowledges working in consult agreement with pharmacist as referred by his/her physician.                  For Pharmacy Admin Tracking Only    Intervention Detail: Adherence Monitorin  Total # of Interventions Recommended: 1  Total # of Interventions Accepted: 1  Time Spent (min): 20      Kanika Jeff RPH, PharmD

## 2025-01-13 ENCOUNTER — HOSPITAL ENCOUNTER (OUTPATIENT)
Dept: VASCULAR LAB | Age: 85
Discharge: HOME | End: 2025-01-15
Attending: PODIATRIST
Payer: MEDICARE

## 2025-01-13 DIAGNOSIS — I70.223 ATHEROSCLEROSIS OF NATIVE ARTERY OF BOTH LOWER EXTREMITIES WITH REST PAIN (HCC): ICD-10-CM

## 2025-01-13 LAB
VAS LEFT ABI: 1.13
VAS LEFT ARM BP: 109 MMHG
VAS LEFT DORSALIS PEDIS BP: 118 MMHG
VAS LEFT PTA BP: 123 MMHG
VAS LEFT TBI: 0.79
VAS LEFT TOE PRESSURE: 86 MMHG
VAS RIGHT ABI: 1.27
VAS RIGHT ARM BP: 108 MMHG
VAS RIGHT DORSALIS PEDIS BP: 109 MMHG
VAS RIGHT PTA BP: 138 MMHG
VAS RIGHT TBI: 0.75
VAS RIGHT TOE PRESSURE: 82 MMHG

## 2025-01-13 PROCEDURE — 93923 UPR/LXTR ART STDY 3+ LVLS: CPT | Performed by: SURGERY

## 2025-01-13 PROCEDURE — 93923 UPR/LXTR ART STDY 3+ LVLS: CPT

## 2025-02-20 ENCOUNTER — ANTI-COAG VISIT (OUTPATIENT)
Age: 85
End: 2025-02-20
Payer: MEDICARE

## 2025-02-20 VITALS
SYSTOLIC BLOOD PRESSURE: 116 MMHG | HEART RATE: 67 BPM | DIASTOLIC BLOOD PRESSURE: 65 MMHG | BODY MASS INDEX: 23.96 KG/M2 | WEIGHT: 131 LBS

## 2025-02-20 DIAGNOSIS — I48.0 PAROXYSMAL ATRIAL FIBRILLATION (HCC): ICD-10-CM

## 2025-02-20 DIAGNOSIS — Z79.01 ENCOUNTER FOR CURRENT LONG-TERM USE OF ANTICOAGULANTS: Primary | ICD-10-CM

## 2025-02-20 LAB
INTERNATIONAL NORMALIZATION RATIO, POC: 2.4
PROTHROMBIN TIME, POC: 0

## 2025-02-20 PROCEDURE — 85610 PROTHROMBIN TIME: CPT

## 2025-02-20 PROCEDURE — 99211 OFF/OP EST MAY X REQ PHY/QHP: CPT

## 2025-02-20 RX ORDER — DOXYCYCLINE HYCLATE 100 MG
100 TABLET ORAL 2 TIMES DAILY
COMMUNITY
Start: 2025-02-13 | End: 2025-02-21

## 2025-02-20 RX ORDER — MUPIROCIN 20 MG/G
OINTMENT TOPICAL 3 TIMES DAILY
COMMUNITY

## 2025-02-20 RX ORDER — CLINDAMYCIN HYDROCHLORIDE 300 MG/1
300 CAPSULE ORAL 3 TIMES DAILY
COMMUNITY
End: 2025-02-21

## 2025-02-20 NOTE — PROGRESS NOTES
[facility]  Medication [facility]  ANTICOAGULATION    Referring Provider: Dr. Garcia     GOAL INR: 2 - 3     TODAY'S INR: 2.4     WARFARIN Dosage: Continue warfarin 3 mg tablet  and 1.5 tablets for 4.5 mg all other days.    INR (no units)   Date Value   2024 2.3   2024 2.9   2024 2.5   2024 2.3   2024 2.2   2024 1.6   2024 3     INR,(POC) (no units)   Date Value   2025 2.5   2024 2.3   10/08/2024 1.9   2024 2.6       Hemoglobin   Date Value Ref Range Status   2024 13.3 11.9 - 15.1 g/dL Final     Hematocrit   Date Value Ref Range Status   2024 39.3 36.3 - 47.1 % Final     ALT   Date Value Ref Range Status   2022 16 5 - 33 U/L Final     AST   Date Value Ref Range Status   2022 25 <32 U/L Final       Medication changes:  Doxycycline  Clindamycin   Bactroban    Notes:    Fingerstick INR drawn per clinic protocol. Patient states no visible blood in urine and no black tarry stool. Denies any missed doses of warfarin. No change in other maintenance medications or in diet. Will recheck INR in 6 weeks. Patient acknowledges working in consult agreement with pharmacist as referred by his/her physician.    Patient had toenail removed 3 weeks ago and it got infected so podiatrist started her on Doxycyline, Clindamycin and Bactroban ointment.              For Pharmacy Admin Tracking Only    Intervention Detail: Adherence Monitorin and New Rx: 3, reason: Needs Additional Therapy  Total # of Interventions Recommended: 4  Total # of Interventions Accepted: 4  Time Spent (min): 20      Kanika Jeff [facility], PharmD

## 2025-03-11 PROBLEM — I48.92 ATRIAL FLUTTER (HCC): Status: RESOLVED | Noted: 2024-08-22 | Resolved: 2025-03-11

## 2025-03-11 PROBLEM — I73.00 RAYNAUD'S DISEASE WITHOUT GANGRENE: Status: ACTIVE | Noted: 2025-03-11

## 2025-04-09 ENCOUNTER — ANTI-COAG VISIT (OUTPATIENT)
Age: 85
End: 2025-04-09
Payer: COMMERCIAL

## 2025-04-09 ENCOUNTER — HOSPITAL ENCOUNTER (OUTPATIENT)
Dept: INFUSION THERAPY | Age: 85
Discharge: HOME | End: 2025-04-09
Payer: COMMERCIAL

## 2025-04-09 VITALS
SYSTOLIC BLOOD PRESSURE: 127 MMHG | DIASTOLIC BLOOD PRESSURE: 73 MMHG | TEMPERATURE: 96.5 F | HEART RATE: 60 BPM | RESPIRATION RATE: 18 BRPM

## 2025-04-09 VITALS
HEART RATE: 51 BPM | SYSTOLIC BLOOD PRESSURE: 115 MMHG | WEIGHT: 129 LBS | BODY MASS INDEX: 23.59 KG/M2 | DIASTOLIC BLOOD PRESSURE: 60 MMHG

## 2025-04-09 DIAGNOSIS — I48.0 PAROXYSMAL ATRIAL FIBRILLATION (HCC): ICD-10-CM

## 2025-04-09 DIAGNOSIS — Z79.01 ENCOUNTER FOR CURRENT LONG-TERM USE OF ANTICOAGULANTS: Primary | ICD-10-CM

## 2025-04-09 DIAGNOSIS — M81.0 OSTEOPOROSIS WITHOUT PATHOLOGICAL FRACTURE: Primary | ICD-10-CM

## 2025-04-09 LAB
INTERNATIONAL NORMALIZATION RATIO, POC: 2.8
PROTHROMBIN TIME, POC: 0

## 2025-04-09 PROCEDURE — 85610 PROTHROMBIN TIME: CPT

## 2025-04-09 PROCEDURE — 6360000002 HC RX W HCPCS: Performed by: INTERNAL MEDICINE

## 2025-04-09 PROCEDURE — 99211 OFF/OP EST MAY X REQ PHY/QHP: CPT

## 2025-04-09 PROCEDURE — 96372 THER/PROPH/DIAG INJ SC/IM: CPT

## 2025-04-09 RX ORDER — ALBUTEROL SULFATE 90 UG/1
4 INHALANT RESPIRATORY (INHALATION) PRN
OUTPATIENT
Start: 2025-10-05

## 2025-04-09 RX ORDER — ACETAMINOPHEN 325 MG/1
650 TABLET ORAL
OUTPATIENT
Start: 2025-10-05

## 2025-04-09 RX ORDER — ONDANSETRON 2 MG/ML
8 INJECTION INTRAMUSCULAR; INTRAVENOUS
OUTPATIENT
Start: 2025-10-05

## 2025-04-09 RX ORDER — EPINEPHRINE 1 MG/ML
0.3 INJECTION, SOLUTION, CONCENTRATE INTRAVENOUS PRN
OUTPATIENT
Start: 2025-10-05

## 2025-04-09 RX ORDER — HYDROCORTISONE SODIUM SUCCINATE 100 MG/2ML
100 INJECTION INTRAMUSCULAR; INTRAVENOUS
OUTPATIENT
Start: 2025-10-05

## 2025-04-09 RX ORDER — SODIUM CHLORIDE 9 MG/ML
INJECTION, SOLUTION INTRAVENOUS CONTINUOUS
OUTPATIENT
Start: 2025-10-05

## 2025-04-09 RX ORDER — DIPHENHYDRAMINE HYDROCHLORIDE 50 MG/ML
50 INJECTION, SOLUTION INTRAMUSCULAR; INTRAVENOUS
OUTPATIENT
Start: 2025-10-05

## 2025-04-09 RX ADMIN — DENOSUMAB 60 MG: 60 INJECTION SUBCUTANEOUS at 11:02

## 2025-04-09 NOTE — DISCHARGE INSTRUCTIONS
Outpatient Discharge Instructions for Prolia Injections    [address] [address]   [phone]      You are advised to carry out the following Instructions:    Diet:  As prescribed by your Physician.    Activity:  As prescribed by your Physician.      Care of injection site:  If the injection site becomes red,sore,swollen,painful, has drainage,or you develop a fever,notify your Physician.      Other:    If you develop hives,rash,itching or trouble breathing, go to the nearest Emergency Room. These could be a sign of allergic reaction.  Continue to take your calcium and vitamin D.  Let your dentist know that you are taking Prolia.  Let your doctor know if you have any unusual jaw or leg bone pain.  Take good care of your teeth,see a dentist often.   Injection is every 6 months.      Follow up appointment:          ANY PROBLEMS OR CONCERNS NOTIFY YOUR PHYSICIAN   OR    GO THE NEAREST EMERGENCY ROOM

## 2025-04-09 NOTE — PROGRESS NOTES
[facility]  Medication Management  ANTICOAGULATION    Referring Provider: Dr. Garcia     GOAL INR: 2 - 3     TODAY'S INR: 2.8     WARFARIN Dosage: Continue warfarin 3 mg tablet  and 1.5 tablets for 4.5 mg all other days.    INR (no units)   Date Value   2024 2.3   2024 2.9   2024 2.5   2024 2.3   2024 2.2   2024 1.6   2024 3     INR,(POC) (no units)   Date Value   2025 2.4   2025 2.5   2024 2.3   10/08/2024 1.9   2024 2.6       Hemoglobin   Date Value Ref Range Status   2024 13.3 11.9 - 15.1 g/dL Final     Hematocrit   Date Value Ref Range Status   2024 39.3 36.3 - 47.1 % Final     ALT   Date Value Ref Range Status   2022 16 5 - 33 U/L Final     AST   Date Value Ref Range Status   2022 25 <32 U/L Final       Medication changes:  none    Notes:    Fingerstick INR drawn per clinic protocol. Patient states no visible blood in urine and no black tarry stool. Denies any missed doses of warfarin. No change in other maintenance medications or in diet. Will recheck INR in 6 weeks. Patient acknowledges working in consult agreement with pharmacist as referred by his/her physician.   Patient's   10 days ago.   Patient has a coldsore from stress.            For Pharmacy Admin Tracking Only    Intervention Detail: Adherence Monitorin  Total # of Interventions Recommended: 1  Total # of Interventions Accepted: 1  Time Spent (min): 20      Kanika Jeff [facility], PharmD

## 2025-05-05 ENCOUNTER — APPOINTMENT (OUTPATIENT)
Dept: ULTRASOUND IMAGING | Age: 85
End: 2025-05-05
Payer: COMMERCIAL

## 2025-05-05 ENCOUNTER — HOSPITAL ENCOUNTER (EMERGENCY)
Age: 85
Discharge: ANOTHER HOSPITAL | End: 2025-05-06
Payer: COMMERCIAL

## 2025-05-05 ENCOUNTER — APPOINTMENT (OUTPATIENT)
Dept: CT IMAGING | Age: 85
End: 2025-05-05
Payer: COMMERCIAL

## 2025-05-05 DIAGNOSIS — K81.0 ACUTE CHOLECYSTITIS: Primary | ICD-10-CM

## 2025-05-05 DIAGNOSIS — K83.8 COMMON BILE DUCT DILATION: ICD-10-CM

## 2025-05-05 LAB
ALBUMIN SERPL-MCNC: 4.1 G/DL (ref 3.5–5.2)
ALBUMIN/GLOB SERPL: 1.7 {RATIO} (ref 1–2.5)
ALP SERPL-CCNC: 111 U/L (ref 35–104)
ALT SERPL-CCNC: 82 U/L (ref 10–35)
ANION GAP SERPL CALCULATED.3IONS-SCNC: 11 MMOL/L (ref 9–16)
AST SERPL-CCNC: 149 U/L (ref 10–35)
BASOPHILS # BLD: 0.03 K/UL (ref 0–0.2)
BASOPHILS NFR BLD: 1 % (ref 0–2)
BILIRUB SERPL-MCNC: 0.5 MG/DL (ref 0–1.2)
BILIRUB UR QL STRIP: NEGATIVE
BUN SERPL-MCNC: 15 MG/DL (ref 8–23)
BUN/CREAT SERPL: 21 (ref 9–20)
CALCIUM SERPL-MCNC: 9.2 MG/DL (ref 8.6–10.4)
CHLORIDE SERPL-SCNC: 102 MMOL/L (ref 98–107)
CLARITY UR: CLEAR
CO2 SERPL-SCNC: 27 MMOL/L (ref 20–31)
COLOR UR: YELLOW
CREAT SERPL-MCNC: 0.7 MG/DL (ref 0.5–0.9)
EOSINOPHIL # BLD: 0.11 K/UL (ref 0–0.44)
EOSINOPHILS RELATIVE PERCENT: 2 % (ref 1–4)
EPI CELLS #/AREA URNS HPF: NORMAL /HPF (ref 0–25)
ERYTHROCYTE [DISTWIDTH] IN BLOOD BY AUTOMATED COUNT: 13.5 % (ref 11.8–14.4)
GFR, ESTIMATED: 86 ML/MIN/1.73M2
GLUCOSE SERPL-MCNC: 141 MG/DL (ref 74–99)
GLUCOSE UR STRIP-MCNC: NEGATIVE MG/DL
HCT VFR BLD AUTO: 36.3 % (ref 36.3–47.1)
HGB BLD-MCNC: 12.4 G/DL (ref 11.9–15.1)
HGB UR QL STRIP.AUTO: NEGATIVE
IMM GRANULOCYTES # BLD AUTO: <0.03 K/UL (ref 0–0.3)
IMM GRANULOCYTES NFR BLD: 0 %
KETONES UR STRIP-MCNC: NEGATIVE MG/DL
LACTATE BLDV-SCNC: 1.1 MMOL/L (ref 0.5–2.2)
LEUKOCYTE ESTERASE UR QL STRIP: NEGATIVE
LIPASE SERPL-CCNC: 40 U/L (ref 13–60)
LYMPHOCYTES NFR BLD: 0.88 K/UL (ref 1.1–3.7)
LYMPHOCYTES RELATIVE PERCENT: 18 % (ref 24–43)
MCH RBC QN AUTO: 34.9 PG (ref 25.2–33.5)
MCHC RBC AUTO-ENTMCNC: 34.2 G/DL (ref 28.4–34.8)
MCV RBC AUTO: 102.3 FL (ref 82.6–102.9)
MONOCYTES NFR BLD: 0.4 K/UL (ref 0.1–1.2)
MONOCYTES NFR BLD: 8 % (ref 3–12)
NEUTROPHILS NFR BLD: 71 % (ref 36–65)
NEUTS SEG NFR BLD: 3.33 K/UL (ref 1.5–8.1)
NITRITE UR QL STRIP: NEGATIVE
NRBC BLD-RTO: 0 PER 100 WBC
PH UR STRIP: 7 [PH] (ref 5–9)
PLATELET # BLD AUTO: 151 K/UL (ref 138–453)
PMV BLD AUTO: 9.4 FL (ref 8.1–13.5)
POTASSIUM SERPL-SCNC: 4.3 MMOL/L (ref 3.7–5.3)
PROT SERPL-MCNC: 6.4 G/DL (ref 6.6–8.7)
PROT UR STRIP-MCNC: NEGATIVE MG/DL
RBC # BLD AUTO: 3.55 M/UL (ref 3.95–5.11)
RBC #/AREA URNS HPF: NORMAL /HPF (ref 0–2)
SODIUM SERPL-SCNC: 140 MMOL/L (ref 136–145)
SP GR UR STRIP: 1.01 (ref 1.01–1.02)
UROBILINOGEN UR STRIP-ACNC: NORMAL EU/DL (ref 0–1)
WBC #/AREA URNS HPF: NORMAL /HPF (ref 0–5)
WBC OTHER # BLD: 4.8 K/UL (ref 3.5–11.3)

## 2025-05-05 PROCEDURE — 80053 COMPREHEN METABOLIC PANEL: CPT

## 2025-05-05 PROCEDURE — 96375 TX/PRO/DX INJ NEW DRUG ADDON: CPT

## 2025-05-05 PROCEDURE — 85025 COMPLETE CBC W/AUTO DIFF WBC: CPT

## 2025-05-05 PROCEDURE — 6360000004 HC RX CONTRAST MEDICATION

## 2025-05-05 PROCEDURE — 74177 CT ABD & PELVIS W/CONTRAST: CPT

## 2025-05-05 PROCEDURE — 76705 ECHO EXAM OF ABDOMEN: CPT

## 2025-05-05 PROCEDURE — 81001 URINALYSIS AUTO W/SCOPE: CPT

## 2025-05-05 PROCEDURE — 99285 EMERGENCY DEPT VISIT HI MDM: CPT

## 2025-05-05 PROCEDURE — 83690 ASSAY OF LIPASE: CPT

## 2025-05-05 PROCEDURE — 83605 ASSAY OF LACTIC ACID: CPT

## 2025-05-05 PROCEDURE — 2580000003 HC RX 258

## 2025-05-05 PROCEDURE — 85610 PROTHROMBIN TIME: CPT

## 2025-05-05 RX ORDER — SODIUM CHLORIDE 0.9 % (FLUSH) 0.9 %
3 SYRINGE (ML) INJECTION EVERY 8 HOURS
Status: DISCONTINUED | OUTPATIENT
Start: 2025-05-05 | End: 2025-05-06 | Stop reason: HOSPADM

## 2025-05-05 RX ORDER — IOPAMIDOL 755 MG/ML
75 INJECTION, SOLUTION INTRAVASCULAR
Status: COMPLETED | OUTPATIENT
Start: 2025-05-05 | End: 2025-05-05

## 2025-05-05 RX ORDER — 0.9 % SODIUM CHLORIDE 0.9 %
1000 INTRAVENOUS SOLUTION INTRAVENOUS ONCE
Status: COMPLETED | OUTPATIENT
Start: 2025-05-05 | End: 2025-05-05

## 2025-05-05 RX ADMIN — SODIUM CHLORIDE 1000 ML: 0.9 INJECTION, SOLUTION INTRAVENOUS at 21:15

## 2025-05-05 RX ADMIN — IOPAMIDOL 75 ML: 755 INJECTION, SOLUTION INTRAVENOUS at 22:03

## 2025-05-05 ASSESSMENT — LIFESTYLE VARIABLES
HOW OFTEN DO YOU HAVE A DRINK CONTAINING ALCOHOL: NEVER
HOW MANY STANDARD DRINKS CONTAINING ALCOHOL DO YOU HAVE ON A TYPICAL DAY: PATIENT DOES NOT DRINK

## 2025-05-06 VITALS
HEIGHT: 64 IN | DIASTOLIC BLOOD PRESSURE: 49 MMHG | RESPIRATION RATE: 20 BRPM | BODY MASS INDEX: 21.17 KG/M2 | HEART RATE: 69 BPM | SYSTOLIC BLOOD PRESSURE: 98 MMHG | OXYGEN SATURATION: 94 % | WEIGHT: 124 LBS | TEMPERATURE: 97.3 F

## 2025-05-06 LAB
INR PPP: 2
PROTHROMBIN TIME: 21.9 SEC (ref 11.7–14.1)

## 2025-05-06 PROCEDURE — 6360000002 HC RX W HCPCS

## 2025-05-06 PROCEDURE — 87077 CULTURE AEROBIC IDENTIFY: CPT

## 2025-05-06 PROCEDURE — 87040 BLOOD CULTURE FOR BACTERIA: CPT

## 2025-05-06 PROCEDURE — 2580000003 HC RX 258

## 2025-05-06 PROCEDURE — 36415 COLL VENOUS BLD VENIPUNCTURE: CPT

## 2025-05-06 PROCEDURE — 87186 SC STD MICRODIL/AGAR DIL: CPT

## 2025-05-06 PROCEDURE — 87205 SMEAR GRAM STAIN: CPT

## 2025-05-06 PROCEDURE — 6370000000 HC RX 637 (ALT 250 FOR IP)

## 2025-05-06 PROCEDURE — 87154 CUL TYP ID BLD PTHGN 6+ TRGT: CPT

## 2025-05-06 PROCEDURE — 96365 THER/PROPH/DIAG IV INF INIT: CPT

## 2025-05-06 RX ORDER — SODIUM CHLORIDE 9 MG/ML
INJECTION, SOLUTION INTRAVENOUS CONTINUOUS
Status: DISCONTINUED | OUTPATIENT
Start: 2025-05-06 | End: 2025-05-06 | Stop reason: HOSPADM

## 2025-05-06 RX ORDER — ONDANSETRON 2 MG/ML
4 INJECTION INTRAMUSCULAR; INTRAVENOUS ONCE
Status: COMPLETED | OUTPATIENT
Start: 2025-05-06 | End: 2025-05-06

## 2025-05-06 RX ORDER — DOFETILIDE 0.25 MG/1
250 CAPSULE ORAL EVERY 12 HOURS SCHEDULED
Status: DISCONTINUED | OUTPATIENT
Start: 2025-05-06 | End: 2025-05-06 | Stop reason: HOSPADM

## 2025-05-06 RX ADMIN — ONDANSETRON 4 MG: 2 INJECTION, SOLUTION INTRAMUSCULAR; INTRAVENOUS at 00:49

## 2025-05-06 RX ADMIN — PIPERACILLIN AND TAZOBACTAM 3375 MG: 3; .375 INJECTION, POWDER, LYOPHILIZED, FOR SOLUTION INTRAVENOUS at 00:48

## 2025-05-06 RX ADMIN — DOFETILIDE 250 MCG: 0.25 CAPSULE ORAL at 01:05

## 2025-05-06 RX ADMIN — SODIUM CHLORIDE: 9 INJECTION, SOLUTION INTRAVENOUS at 01:30

## 2025-05-06 NOTE — ED PROVIDER NOTES
Ref Range    WBC 4.8 3.5 - 11.3 k/uL    RBC 3.55 (L) 3.95 - 5.11 m/uL    Hemoglobin 12.4 11.9 - 15.1 g/dL    Hematocrit 36.3 36.3 - 47.1 %    .3 82.6 - 102.9 fL    MCH 34.9 (H) 25.2 - 33.5 pg    MCHC 34.2 28.4 - 34.8 g/dL    RDW 13.5 11.8 - 14.4 %    Platelets 151 138 - 453 k/uL    MPV 9.4 8.1 - 13.5 fL    NRBC Automated 0.0 0.0 per 100 WBC    Neutrophils % 71 (H) 36 - 65 %    Lymphocytes % 18 (L) 24 - 43 %    Monocytes % 8 3 - 12 %    Eosinophils % 2 1 - 4 %    Basophils % 1 0 - 2 %    Immature Granulocytes % 0 0 %    Neutrophils Absolute 3.33 1.50 - 8.10 k/uL    Lymphocytes Absolute 0.88 (L) 1.10 - 3.70 k/uL    Monocytes Absolute 0.40 0.10 - 1.20 k/uL    Eosinophils Absolute 0.11 0.00 - 0.44 k/uL    Basophils Absolute 0.03 0.00 - 0.20 k/uL    Immature Granulocytes Absolute <0.03 0.00 - 0.30 k/uL   CMP   Result Value Ref Range    Sodium 140 136 - 145 mmol/L    Potassium 4.3 3.7 - 5.3 mmol/L    Chloride 102 98 - 107 mmol/L    CO2 27 20 - 31 mmol/L    Anion Gap 11 9 - 16 mmol/L    Glucose 141 (H) 74 - 99 mg/dL    BUN 15 8 - 23 mg/dL    Creatinine 0.7 0.50 - 0.90 mg/dL    Est, Glom Filt Rate 86 >60 mL/min/1.73m2    BUN/Creatinine Ratio 21 (H) 9 - 20    Calcium 9.2 8.6 - 10.4 mg/dL    Total Protein 6.4 (L) 6.6 - 8.7 g/dL    Albumin 4.1 3.5 - 5.2 g/dL    Albumin/Globulin Ratio 1.7 1.0 - 2.5    Total Bilirubin 0.5 0.00 - 1.20 mg/dL    Alkaline Phosphatase 111 (H) 35 - 104 U/L    ALT 82 (H) 10 - 35 U/L     (H) 10 - 35 U/L   Lipase   Result Value Ref Range    Lipase 40 13 - 60 U/L   Lactic Acid   Result Value Ref Range    Lactic Acid 1.1 0.5 - 2.2 mmol/L   Urinalysis with Reflex to Culture    Specimen: Urine   Result Value Ref Range    Color, UA Yellow Yellow    Turbidity UA Clear Clear    Glucose, Ur NEGATIVE NEGATIVE mg/dL    Bilirubin, Urine NEGATIVE NEGATIVE    Ketones, Urine NEGATIVE NEGATIVE mg/dL    Specific Gravity, UA 1.010 1.010 - 1.020    Urine Hgb NEGATIVE NEGATIVE    pH, Urine 7.0 5.0 - 9.0

## 2025-05-08 ENCOUNTER — RESULTS FOLLOW-UP (OUTPATIENT)
Dept: EMERGENCY DEPT | Age: 85
End: 2025-05-08

## 2025-05-08 LAB
MICROORGANISM SPEC CULT: ABNORMAL
SERVICE CMNT-IMP: ABNORMAL
SERVICE CMNT-IMP: ABNORMAL
SPECIMEN DESCRIPTION: ABNORMAL
SPECIMEN DESCRIPTION: ABNORMAL

## 2025-05-12 PROBLEM — K80.50 BILIARY COLIC: Status: ACTIVE | Noted: 2025-05-12

## 2025-05-19 ENCOUNTER — HOSPITAL ENCOUNTER (OUTPATIENT)
Age: 85
Discharge: HOME | End: 2025-05-21
Payer: MEDICARE

## 2025-05-19 ENCOUNTER — OFFICE VISIT (OUTPATIENT)
Dept: CARDIOLOGY | Age: 85
End: 2025-05-19
Payer: MEDICARE

## 2025-05-19 ENCOUNTER — ANTI-COAG VISIT (OUTPATIENT)
Age: 85
End: 2025-05-19
Payer: MEDICARE

## 2025-05-19 VITALS
HEIGHT: 64 IN | BODY MASS INDEX: 21.51 KG/M2 | SYSTOLIC BLOOD PRESSURE: 108 MMHG | HEART RATE: 55 BPM | WEIGHT: 126 LBS | DIASTOLIC BLOOD PRESSURE: 60 MMHG | RESPIRATION RATE: 18 BRPM

## 2025-05-19 VITALS
BODY MASS INDEX: 21.51 KG/M2 | DIASTOLIC BLOOD PRESSURE: 56 MMHG | SYSTOLIC BLOOD PRESSURE: 112 MMHG | HEART RATE: 51 BPM | HEIGHT: 64 IN | WEIGHT: 126 LBS

## 2025-05-19 DIAGNOSIS — Z79.01 CHRONIC ANTICOAGULATION: ICD-10-CM

## 2025-05-19 DIAGNOSIS — Z79.01 ENCOUNTER FOR CURRENT LONG-TERM USE OF ANTICOAGULANTS: ICD-10-CM

## 2025-05-19 DIAGNOSIS — Z98.890 S/P MAZE OPERATION FOR ATRIAL FIBRILLATION: ICD-10-CM

## 2025-05-19 DIAGNOSIS — Z79.899 VISIT FOR MONITORING TIKOSYN THERAPY: ICD-10-CM

## 2025-05-19 DIAGNOSIS — I48.0 PAROXYSMAL ATRIAL FIBRILLATION (HCC): ICD-10-CM

## 2025-05-19 DIAGNOSIS — Z79.01 ENCOUNTER FOR CURRENT LONG-TERM USE OF ANTICOAGULANTS: Primary | ICD-10-CM

## 2025-05-19 DIAGNOSIS — Z95.2 S/P MITRAL VALVE REPLACEMENT: ICD-10-CM

## 2025-05-19 DIAGNOSIS — R00.2 INTERMITTENT PALPITATIONS: ICD-10-CM

## 2025-05-19 DIAGNOSIS — I34.0 SEVERE MITRAL REGURGITATION: ICD-10-CM

## 2025-05-19 DIAGNOSIS — I50.32 CHRONIC DIASTOLIC CONGESTIVE HEART FAILURE (HCC): ICD-10-CM

## 2025-05-19 DIAGNOSIS — Z86.79 S/P MAZE OPERATION FOR ATRIAL FIBRILLATION: ICD-10-CM

## 2025-05-19 DIAGNOSIS — Z51.81 VISIT FOR MONITORING TIKOSYN THERAPY: ICD-10-CM

## 2025-05-19 DIAGNOSIS — Z01.810 PREOP CARDIOVASCULAR EXAM: Primary | ICD-10-CM

## 2025-05-19 DIAGNOSIS — I48.0 PAF (PAROXYSMAL ATRIAL FIBRILLATION) (HCC): ICD-10-CM

## 2025-05-19 LAB
ECHO BSA: 1.61 M2
INTERNATIONAL NORMALIZATION RATIO, POC: 2.9
PROTHROMBIN TIME, POC: 0

## 2025-05-19 PROCEDURE — 1160F RVW MEDS BY RX/DR IN RCRD: CPT | Performed by: NURSE PRACTITIONER

## 2025-05-19 PROCEDURE — 99214 OFFICE O/P EST MOD 30 MIN: CPT | Performed by: NURSE PRACTITIONER

## 2025-05-19 PROCEDURE — 93243 EXT ECG>48HR<7D SCAN A/R: CPT

## 2025-05-19 PROCEDURE — 1123F ACP DISCUSS/DSCN MKR DOCD: CPT | Performed by: NURSE PRACTITIONER

## 2025-05-19 PROCEDURE — 1159F MED LIST DOCD IN RCRD: CPT | Performed by: NURSE PRACTITIONER

## 2025-05-19 PROCEDURE — 93000 ELECTROCARDIOGRAM COMPLETE: CPT | Performed by: FAMILY MEDICINE

## 2025-05-19 NOTE — PROGRESS NOTES
[facility]  Medication Management  ANTICOAGULATION    Referring Provider: Dr Garcia    GOAL INR: 2.0-3.0    TODAY'S INR: 2.9    WARFARIN Dosage: continue 3mg F, 4.5mg all other days    INR (no units)   Date Value   2025 2.0   2024 2.3   2024 2.9   2024 2.5   2024 2.3   2024 2.2   2024 1.6     INR,(POC) (no units)   Date Value   2025 2.9   2025 2.8   2025 2.4   2025 2.5   2024 2.3   10/08/2024 1.9   2024 2.6       Hemoglobin   Date Value Ref Range Status   2025 12.4 11.9 - 15.1 g/dL Final     Hematocrit   Date Value Ref Range Status   2025 36.3 36.3 - 47.1 % Final     ALT   Date Value Ref Range Status   2025 82 (H) 10 - 35 U/L Final     AST   Date Value Ref Range Status   2025 149 (H) 10 - 35 U/L Final       Medication changes:  No change    Notes:    Fingerstick INR drawn per clinic protocol. Patient states no visible blood in urine and no black tarry stool. Denies any missed doses of warfarin. No change in other maintenance medications or in diet. Will recheck INR in 4 weeks. Patient was seen 25 in [facility] ER for abdominal pain with transfer to [facility]. Patient had follow up appt with Dr Garcia for biliary colic and GERD on 25 and sees surgeon tomorrow for potential gallbladder surgery.  Patient sees Dr Gross following this appointment. Patient is eating with caution, very little fatty intake. Patient acknowledges working in consult agreement with pharmacist as referred by his/her physician.                  For Pharmacy Admin Tracking Only    Intervention Detail: Adherence Monitorin  Total # of Interventions Recommended: 2  Total # of Interventions Accepted: 2  Time Spent (min): 20      Jessica Crawford R.Ph., 2025,1:41 PM

## 2025-05-19 NOTE — PATIENT INSTRUCTIONS
Continue to take warfarin 3mg (1 tablet) Fridays and 4.5mg (1 1/2 tablets) all other days.  Continue to monitor urine and stool for signs and symptoms of bleeding.   Please notify the clinic of any medication changes.   Please remember to bring all medications (both prescription and OTC) to your next visit.   Kindly notify the clinic if you are unable to make to your next appointment.   Follow warfarin dosing instructions on dosing calendar provided.

## 2025-05-19 NOTE — PROGRESS NOTES
ECG's every 6 months and make sure his QTc remains less than 500 ms. I would also suggest monitoring their LFT's as well as renal function at least every 6 months.  Stroke Risk: Her CHADS2-VASc score is 3/9 (3.2% stroke risk)   [id number] Score for Atrial Fibrillation Stroke Risk   Risk   Factors  Component Value   C CHF Yes 1   H HTN No 0   A2 Age >= 75 Yes,  (84 y.o.) 2   D DM No 0   S2 Prior Stroke/TIA No 0   V Vascular Disease No 0   A Age 65-74 No,  (84 y.o.) 0   Sc Sex female 1    [id number]  Score  4   Score last updated 6/23/20 9:41 AM EDT  Click here for a link to the UpToDate guideline \"Atrial Fibrillation: Anticoagulation therapy to prevent embolization  Disclaimer: Risk Score calculation is dependent on accuracy of patient problem list and past encounter diagnosis.  Anticoagulation: Continue Warfarin. Goal INR 2-3.  I also reminded her to watch for signs of blood in her stool or black tarry stools and stop the medication immediately if this develops as this could be life threatening.  I did order a repeat CAM monitor to re-assess her atrial fibrillation burden as well as her heart rate and for other arrhythmias.     Chronic diastolic heart failure: New York Heart Association Class: IIa (Mild symptoms only in normal activities) Currently euvolemic. No lower extremity edema. No shortness of breath. No lower extremity edema. No weight gain.   Beta Blocker: Not indicated at this time.   ACE Inibitor/ARB: Not indicated at this time.  Diuretics: Not indicated at this time.  Heart failure counseling: I told her to continue wearing lower extremity compression stockings and I advised them to try and keep their legs up whenever possible and to limit salt in their diet.    Finally, I recommended that she continue her other medications and follow up with you as previously scheduled.     FOLLOW UP:   I told Ms. Springer to call my office if she had any problems, but otherwise I asked her to Return in about 6 months

## 2025-05-23 PROCEDURE — 85610 PROTHROMBIN TIME: CPT | Performed by: FAMILY MEDICINE

## 2025-05-23 PROCEDURE — 99211 OFF/OP EST MAY X REQ PHY/QHP: CPT | Performed by: FAMILY MEDICINE

## 2025-05-29 LAB — ECHO BSA: 1.61 M2

## 2025-05-30 ENCOUNTER — RESULTS FOLLOW-UP (OUTPATIENT)
Dept: CARDIOLOGY | Age: 85
End: 2025-05-30

## 2025-06-06 ENCOUNTER — ANTI-COAG VISIT (OUTPATIENT)
Age: 85
End: 2025-06-06
Payer: MEDICARE

## 2025-06-06 VITALS
HEART RATE: 63 BPM | SYSTOLIC BLOOD PRESSURE: 117 MMHG | DIASTOLIC BLOOD PRESSURE: 57 MMHG | BODY MASS INDEX: 21.8 KG/M2 | WEIGHT: 127 LBS

## 2025-06-06 DIAGNOSIS — Z79.01 ENCOUNTER FOR CURRENT LONG-TERM USE OF ANTICOAGULANTS: Primary | ICD-10-CM

## 2025-06-06 DIAGNOSIS — I48.0 PAROXYSMAL ATRIAL FIBRILLATION (HCC): ICD-10-CM

## 2025-06-06 LAB
INTERNATIONAL NORMALIZATION RATIO, POC: 1.3
PROTHROMBIN TIME, POC: 0

## 2025-06-06 PROCEDURE — 85610 PROTHROMBIN TIME: CPT

## 2025-06-06 PROCEDURE — 99212 OFFICE O/P EST SF 10 MIN: CPT

## 2025-06-06 NOTE — PROGRESS NOTES
[facility]  ANTICOAGULATION    Referring Provider: Dr Garcia     GOAL INR: 2 - 3      TODAY'S INR: 1.3    WARFARIN Dosage: Warfarin 1.5 tablets (4.5 mg) today, then continue 3 mg Friday, 4.5 mg all other days    INR (no units)   Date Value   05/05/2025 2.0   08/29/2024 2.3   07/05/2024 2.9   05/09/2024 2.5   03/28/2024 2.3   02/29/2024 2.2   02/11/2024 1.6     INR,(POC) (no units)   Date Value   05/19/2025 2.9   04/09/2025 2.8   02/20/2025 2.4   01/09/2025 2.5   11/20/2024 2.3   10/08/2024 1.9   08/28/2024 2.6       Hemoglobin   Date Value Ref Range Status   05/05/2025 12.4 11.9 - 15.1 g/dL Final     Hematocrit   Date Value Ref Range Status   05/05/2025 36.3 36.3 - 47.1 % Final     ALT   Date Value Ref Range Status   05/05/2025 82 (H) 10 - 35 U/L Final     AST   Date Value Ref Range Status   05/05/2025 149 (H) 10 - 35 U/L Final       Medication changes:  Acyclovir    Notes:    Fingerstick INR drawn per clinic protocol. Patient states no visible blood in urine and no black tarry stool. Denies any missed doses of warfarin. No change in other maintenance medications or in diet. Will recheck INR in 1.5 weeks. Patient acknowledges working in consult agreement with pharmacist as referred by his/her physician.      Acyclovir started for cold sore on 6/4.                Cholecystectomy May 29th.  Patient was instructed to hold Warfarin 5 days prior to surgery.  Patient didn't restart her Warfarin until 4 days after her surgery.  She said that cardiology instructed to start her Warfarin right after the surgery, but the surgeon instructed her to wait 48 hours after the surgery to restart Warfarin.  Patient didn't restart Warfarin until 4 days after surgery.    Patient comes in today with excessive bruising along her abdomen.  She is worried about her INR.  She is not on Lovenox.  She has been taking Warfarin 4.5 mg daily x 4 days.    She is going to see Shante AUGUST immediately

## 2025-06-17 ENCOUNTER — ANTI-COAG VISIT (OUTPATIENT)
Age: 85
End: 2025-06-17
Payer: MEDICARE

## 2025-06-17 VITALS
HEART RATE: 51 BPM | WEIGHT: 125 LBS | BODY MASS INDEX: 21.46 KG/M2 | SYSTOLIC BLOOD PRESSURE: 126 MMHG | DIASTOLIC BLOOD PRESSURE: 56 MMHG

## 2025-06-17 DIAGNOSIS — I48.0 PAROXYSMAL ATRIAL FIBRILLATION (HCC): ICD-10-CM

## 2025-06-17 DIAGNOSIS — Z79.01 ENCOUNTER FOR CURRENT LONG-TERM USE OF ANTICOAGULANTS: Primary | ICD-10-CM

## 2025-06-17 LAB
INTERNATIONAL NORMALIZATION RATIO, POC: 1.8
PROTHROMBIN TIME, POC: 0

## 2025-06-17 PROCEDURE — 99212 OFFICE O/P EST SF 10 MIN: CPT | Performed by: COUNSELOR

## 2025-06-17 PROCEDURE — 85610 PROTHROMBIN TIME: CPT | Performed by: COUNSELOR

## 2025-06-17 NOTE — PROGRESS NOTES
[facility]  ANTICOAGULATION    Referring Provider: Dr Garcia     GOAL INR: 2 - 3      TODAY'S INR: 1.8     WARFARIN Dosage:  Warfarin 6 mg today then continue 3 mg Friday, 4.5 mg all other days    INR (no units)   Date Value   2025 2.0   2024 2.3   2024 2.9   2024 2.5   2024 2.3   2024 2.2   2024 1.6     INR,(POC) (no units)   Date Value   2025 1.3   2025 2.9   2025 2.8   2025 2.4   2025 2.5   2024 2.3   10/08/2024 1.9       Hemoglobin   Date Value Ref Range Status   2025 12.4 11.9 - 15.1 g/dL Final     Hematocrit   Date Value Ref Range Status   2025 36.3 36.3 - 47.1 % Final     ALT   Date Value Ref Range Status   2025 82 (H) 10 - 35 U/L Final     AST   Date Value Ref Range Status   2025 149 (H) 10 - 35 U/L Final       Medication changes:  25 amoxicillin prescribed for 10 days for fullness in both ears    Notes:    Fingerstick INR drawn per clinic protocol. Patient states no visible blood in urine and no black tarry stool. Denies any missed doses of warfarin. No change in other maintenance medications or in diet. Will recheck INR in 3 weeks. Patient acknowledges working in consult agreement with pharmacist as referred by his/her physician.   Patient had cholecystectomy on May 29th.  Patient was seen in clinic on 25 with excessive bruising on her abdomen (not on Lovenox).  Bruising has since resolved.  Patient is feeling better and is getting closer to her baseline prior to surgery.     For Pharmacy Admin Tracking Only    Intervention Detail: Adherence Monitorin and Dose Adjustment: 1, reason: Improve Adherence, Therapy Optimization  Total # of Interventions Recommended: 2  Total # of Interventions Accepted: 2  Time Spent (min): 20      Anabela Edwards RPH

## 2025-07-09 ENCOUNTER — ANTI-COAG VISIT (OUTPATIENT)
Age: 85
End: 2025-07-09
Payer: MEDICARE

## 2025-07-09 VITALS
DIASTOLIC BLOOD PRESSURE: 57 MMHG | BODY MASS INDEX: 21.63 KG/M2 | HEART RATE: 53 BPM | WEIGHT: 126 LBS | SYSTOLIC BLOOD PRESSURE: 107 MMHG

## 2025-07-09 DIAGNOSIS — Z79.01 ENCOUNTER FOR CURRENT LONG-TERM USE OF ANTICOAGULANTS: Primary | ICD-10-CM

## 2025-07-09 DIAGNOSIS — I48.0 PAROXYSMAL ATRIAL FIBRILLATION (HCC): ICD-10-CM

## 2025-07-09 LAB
INTERNATIONAL NORMALIZATION RATIO, POC: 2
PROTHROMBIN TIME, POC: 0

## 2025-07-09 PROCEDURE — 99211 OFF/OP EST MAY X REQ PHY/QHP: CPT

## 2025-07-09 PROCEDURE — 85610 PROTHROMBIN TIME: CPT

## 2025-07-09 NOTE — PROGRESS NOTES
[facility]  Med[facility]  ANTICOAGULATION    Referring Provider: Dr Garcia     GOAL INR: 2 - 3      TODAY'S INR: 2.0     WARFARIN Dosage:  Continue warfarin 3 mg  and 1.5 tablets for 4.5 mg all other days.    INR (no units)   Date Value   2025 2.0   2024 2.3   2024 2.9   2024 2.5   2024 2.3   2024 2.2   2024 1.6     INR,(POC) (no units)   Date Value   2025 1.8   2025 1.3   2025 2.9   2025 2.8   2025 2.4   2025 2.5   2024 2.3       Hemoglobin   Date Value Ref Range Status   2025 12.4 11.9 - 15.1 g/dL Final     Hematocrit   Date Value Ref Range Status   2025 36.3 36.3 - 47.1 % Final     ALT   Date Value Ref Range Status   2025 82 (H) 10 - 35 U/L Final     AST   Date Value Ref Range Status   2025 149 (H) 10 - 35 U/L Final       Medication changes:  none    Notes:    Fingerstick INR drawn per clinic protocol. Patient states no visible blood in urine and no black tarry stool. Denies any missed doses of warfarin. No change in other maintenance medications or in diet. Will recheck INR in 5 weeks. Patient acknowledges working in consult agreement with pharmacist as referred by his/her physician.                  For Pharmacy Admin Tracking Only    Intervention Detail: Adherence Monitorin  Total # of Interventions Recommended: 1  Total # of Interventions Accepted: 1  Time Spent (min): 20      Kanika Jeff RPH, PharmD

## 2025-08-20 ENCOUNTER — ANTI-COAG VISIT (OUTPATIENT)
Age: 85
End: 2025-08-20
Payer: MEDICARE

## 2025-08-20 VITALS
BODY MASS INDEX: 21.28 KG/M2 | WEIGHT: 124 LBS | HEART RATE: 59 BPM | DIASTOLIC BLOOD PRESSURE: 63 MMHG | SYSTOLIC BLOOD PRESSURE: 118 MMHG

## 2025-08-20 DIAGNOSIS — Z79.01 ENCOUNTER FOR CURRENT LONG-TERM USE OF ANTICOAGULANTS: Primary | ICD-10-CM

## 2025-08-20 DIAGNOSIS — I48.0 PAROXYSMAL ATRIAL FIBRILLATION (HCC): ICD-10-CM

## 2025-08-20 LAB
INTERNATIONAL NORMALIZATION RATIO, POC: 1.8
PROTHROMBIN TIME, POC: NORMAL

## 2025-08-20 PROCEDURE — 85610 PROTHROMBIN TIME: CPT

## 2025-08-20 PROCEDURE — 99211 OFF/OP EST MAY X REQ PHY/QHP: CPT

## (undated) DEVICE — FORCEPS BX L240CM JAW DIA2.4MM ORNG L CAP W/ NDL DISP RAD

## (undated) DEVICE — MEDI-VAC NON-CONDUCTIVE TUBING7MM X 30.5 (100FT): Brand: CARDINAL HEALTH

## (undated) DEVICE — SOLUTION IV IRRIG POUR BRL 0.9% SODIUM CHL 2F7124

## (undated) DEVICE — CANNULA ORAL NSL AD CO2 N INTUB O2 DEL DISP TRU LNK